# Patient Record
Sex: FEMALE | Race: OTHER | HISPANIC OR LATINO | Employment: FULL TIME | URBAN - METROPOLITAN AREA
[De-identification: names, ages, dates, MRNs, and addresses within clinical notes are randomized per-mention and may not be internally consistent; named-entity substitution may affect disease eponyms.]

---

## 2022-06-27 ENCOUNTER — OFFICE VISIT (OUTPATIENT)
Dept: INTERNAL MEDICINE CLINIC | Facility: CLINIC | Age: 32
End: 2022-06-27
Payer: COMMERCIAL

## 2022-06-27 VITALS
TEMPERATURE: 97.3 F | SYSTOLIC BLOOD PRESSURE: 104 MMHG | WEIGHT: 211.2 LBS | BODY MASS INDEX: 37.42 KG/M2 | RESPIRATION RATE: 16 BRPM | DIASTOLIC BLOOD PRESSURE: 68 MMHG | HEART RATE: 64 BPM | OXYGEN SATURATION: 98 % | HEIGHT: 63 IN

## 2022-06-27 DIAGNOSIS — Z00.00 ANNUAL PHYSICAL EXAM: Primary | ICD-10-CM

## 2022-06-27 DIAGNOSIS — J45.20 MILD INTERMITTENT ASTHMA WITHOUT COMPLICATION: ICD-10-CM

## 2022-06-27 DIAGNOSIS — Z3A.01 LESS THAN 8 WEEKS GESTATION OF PREGNANCY: ICD-10-CM

## 2022-06-27 PROBLEM — L30.9 ECZEMA: Status: ACTIVE | Noted: 2022-06-27

## 2022-06-27 PROBLEM — J30.9 ALLERGIC RHINITIS: Status: ACTIVE | Noted: 2022-06-27

## 2022-06-27 PROCEDURE — 3725F SCREEN DEPRESSION PERFORMED: CPT | Performed by: GENERAL ACUTE CARE HOSPITAL

## 2022-06-27 PROCEDURE — 1036F TOBACCO NON-USER: CPT | Performed by: GENERAL ACUTE CARE HOSPITAL

## 2022-06-27 PROCEDURE — 3008F BODY MASS INDEX DOCD: CPT | Performed by: GENERAL ACUTE CARE HOSPITAL

## 2022-06-27 PROCEDURE — 99385 PREV VISIT NEW AGE 18-39: CPT | Performed by: GENERAL ACUTE CARE HOSPITAL

## 2022-06-27 RX ORDER — FLUTICASONE PROPIONATE AND SALMETEROL 250; 50 UG/1; UG/1
1 POWDER RESPIRATORY (INHALATION) DAILY
COMMUNITY
Start: 2022-02-07

## 2022-06-27 RX ORDER — CLOBETASOL PROPIONATE 0.5 MG/G
1 OINTMENT TOPICAL 2 TIMES DAILY PRN
COMMUNITY
End: 2022-08-03

## 2022-06-27 RX ORDER — ALBUTEROL SULFATE 90 UG/1
2 AEROSOL, METERED RESPIRATORY (INHALATION)
COMMUNITY
Start: 2022-02-10

## 2022-06-27 RX ORDER — AZELASTINE 1 MG/ML
1-2 SPRAY, METERED NASAL 2 TIMES DAILY
COMMUNITY
Start: 2022-02-08

## 2022-06-27 RX ORDER — MONTELUKAST SODIUM 10 MG/1
TABLET ORAL
COMMUNITY
Start: 2022-06-21 | End: 2022-06-27

## 2022-06-27 NOTE — PROGRESS NOTES
Assessment/Plan:    Mild intermittent asthma without complication  Well controlled, on advair  Ok to stop singulair  Establish care with pulm    Less than 8 weeks gestation of pregnancy  Several home pregnancy test positive  Has appointment with ob    Annual physical exam  Order routine blood works  Up to date on vaccines  can get extra dose tdap at ob  Diagnoses and all orders for this visit:    Annual physical exam  -     hCG, quantitative; Future  -     Ambulatory Referral to Pulmonology; Future  -     Iron Panel (Includes Ferritin, Iron Sat%, Iron, and TIBC); Future  -     Ferritin; Future  -     Vitamin D 25 hydroxy; Future  -     CBC and differential; Future  -     Vitamin B12; Future  -     HIV 1/2 Antigen/Antibody (4th Generation) w Reflex [facility]; Future  -     Hepatitis C antibody; Future    Mild intermittent asthma without complication    Less than 8 weeks gestation of pregnancy    Other orders  -     Fluticasone-Salmeterol (Advair) 250-50 mcg/dose inhaler; Inhale 1 puff daily  -     albuterol (PROVENTIL HFA,VENTOLIN HFA) 90 mcg/act inhaler; Inhale 2 puffs  -     azelastine (ASTELIN) 0 1 % nasal spray; 1-2 sprays into each nostril 2 (two) times a day  -     clobetasol (TEMOVATE) 0 05 % ointment; Apply 1 application topically 2 (two) times a day as needed  -     Discontinue: montelukast (SINGULAIR) 10 mg tablet; take 1 tablet by mouth nightly DURING SEASONS OF INCREASED ASTHMA SYMPTOMS (Patient not taking: Reported on 6/27/2022)  -     budesonide (RINOCORT AQUA) 32 MCG/ACT nasal spray; 1 spray into each nostril daily        Depression Screening and Follow-up Plan: Patient was screened for depression during today's encounter  They screened negative with a PHQ-2 score of 0  Subjective:      Patient ID: Bonilla Palma is a 33 y o  female      HPI  Establish care  Home pregnancy test positive  Well controlled asthma, eczema, allergic rhinitis    The following portions of the patient's history were reviewed and updated as appropriate: allergies, past family history, past social history and past surgical history      Review of Systems      Objective:      /68   Pulse 64   Temp (!) 97 3 °F (36 3 °C)   Resp 16   Ht 5' 3" (1 6 m)   Wt 95 8 kg (211 lb 3 2 oz)   LMP 05/10/2022   SpO2 98%   BMI 37 41 kg/m²          Physical Exam

## 2022-07-11 ENCOUNTER — ULTRASOUND (OUTPATIENT)
Dept: OBGYN CLINIC | Facility: CLINIC | Age: 32
End: 2022-07-11
Payer: COMMERCIAL

## 2022-07-11 VITALS
SYSTOLIC BLOOD PRESSURE: 122 MMHG | HEIGHT: 63 IN | BODY MASS INDEX: 37.56 KG/M2 | WEIGHT: 212 LBS | DIASTOLIC BLOOD PRESSURE: 62 MMHG

## 2022-07-11 DIAGNOSIS — N91.1 SECONDARY AMENORRHEA: Primary | ICD-10-CM

## 2022-07-11 DIAGNOSIS — Z13.79 GENETIC SCREENING: ICD-10-CM

## 2022-07-11 PROCEDURE — 76817 TRANSVAGINAL US OBSTETRIC: CPT | Performed by: OBSTETRICS & GYNECOLOGY

## 2022-07-11 PROCEDURE — 99214 OFFICE O/P EST MOD 30 MIN: CPT | Performed by: OBSTETRICS & GYNECOLOGY

## 2022-07-19 ENCOUNTER — INITIAL PRENATAL (OUTPATIENT)
Dept: OBGYN CLINIC | Facility: CLINIC | Age: 32
End: 2022-07-19

## 2022-07-19 DIAGNOSIS — Z13.29 SCREENING FOR THYROID DISORDER: ICD-10-CM

## 2022-07-19 DIAGNOSIS — Z34.01 ENCOUNTER FOR SUPERVISION OF NORMAL FIRST PREGNANCY IN FIRST TRIMESTER: Primary | ICD-10-CM

## 2022-07-19 PROCEDURE — OBC: Performed by: OBSTETRICS & GYNECOLOGY

## 2022-07-19 NOTE — PROGRESS NOTES
OB INTAKE INTERVIEW  Patient is 33 y o y o  who presents for OB intake at 10 wks  She is accompanied by: Self via Telephone  The father of her baby Cassandra Jalloh is involved in the pregnancy and is 29years old    Last Menstrual Period: 05/10/22  Ultrasound: Measured 8 weeks 6 days on 2022  Estimated Date of Delivery: 22 consistent with LMP confirmed by US     Signs/Symptoms of Pregnancy    Current pregnancy symptoms: Nausea and heart burn  Taking Tums  Advised small frequent meals  Avoid greasy and acidic foods  Wait 2 hours before lying down after eating  Constipation no  Headaches no  Cramping/spotting no  PICA cravings no    Diabetes-  There is no height or weight on file to calculate BMI  If patient has 1 or more, please order early 1 hour GTT  History of GDM no  BMI >35 Yes 38 16  History of PCOS or current metformin use no  History of LGA/macrosomic infant (4000g/9lbs) no    If patient has 2 or more, please order early 1 hour GTT  BMI>30 Yes  AMA no  First degree relative with type 2 diabetes no  FOB is Type 1 Diabetic  History of chronic HTN, hyperlipidemia, elevated A1C no  High risk race (, , ,  or ) no    Hypertension- if you answer yes, please order preeclampsia labs (cbc, comprehensive metabolic panel, urine protein creatinine ratio, uric acid)  History of of chronic HTN no  History of gestational HTN no  History of preeclampsia, eclampsia, or HELLP syndrome no  History of diabetes no  History of lupus, autoimmune disease, kidney disease no    Thyroid- if yes order TSH with reflex T4  History of thyroid disease : Yes, as teenager  Was taking Synthroid, then levels normalized  Bleeding Disorder or Hx of DVT-patient or first degree relative with history of  Order the following if not done previously     (Factor V, antithrombin III, prothrombin gene mutation, protein C and S Ag, lupus anticoagulant, anticardiolipin, beta-2 glycoprotein)   no    OB/GYN-  History of abnormal pap smear no  History of HPV no  History of Herpes/HSV no  History of other STI (gonorrhea, chlamydia, trich) no  History of prior  no  History of prior  no  History of  delivery prior to 36 weeks 6 days no  History of blood transfusion no  Ok for blood transfusion Yes    Substance screening- if yes outside of tobacco for her or anyone in her home-order urine drug screen  History of tobacco use no  Currently using tobacco no  Currently using alcohol no  Presently using drugs no  Past drug use  no  IV drug use-If yes add Hep C antibody to labs no  Partner drug use no  Parent/Family drug use no    MRSA Screening-   Does the pt have a hx of MRSA? no  If yes- please follow MRSA protocol and obtain a nasal swab for MRSA culture    Immunizations:  Influenza vaccine given this season No  Discussed Tdap vaccine Yes  Discussed COVID Vaccine Yes  Recieved    Genetic/MFM-  Do you or your partner have a history of any of the following in yourselves or first degree relatives? Cystic fibrosis no  Spinal muscular atrophy no  Hemoglobinopathy/Sickle Cell/Thalassemia no  Fragile X Intellectual Disability no    If yes, discuss carrier screening and recommend consultation with MFM/genetic counseling  If no, discuss option for carrier screening and/or genetic testing with Nuchal Ultrasound  Patient interested Yes  Appointment at [place] [id number] made Yes     Interview education    [facility] Pregnancy Essentials Book reviewed and discussed Yes  Nurse/Family Partnership- patient may qualify No; referral placed No  Prenatal lab work scripts Yes  Extra labs ordered: Yes  1 hour Fasting Glucose, TSH with reflex to T4  The patient has a history now or in prior pregnancy notable for:Elevated BMI, History of Hypothyroidism as teenager      Details that I feel the provider should be aware of: Elevated BMI    PN1 visit scheduled   The patient was oriented to our practice, reviewed delivering physicians and [employer] for Delivery  All questions were answered  This was a telephone intake which lasted approximately 45 minutes       Interviewed by: Jada Bazzi, RN    Designated Lab: [facility]

## 2022-07-19 NOTE — PATIENT INSTRUCTIONS
Pregnancy at [address] to [address]:   You are now at the end of your first trimester and entering your second trimester  Morning sickness usually goes away by this time  You may have other symptoms such as fatigue, frequent urination, and headaches  You may have gained 2 to 4 pounds by now  DISCHARGE INSTRUCTIONS:   Return to the emergency department if:   You have pain or cramping in your abdomen or low back  You have heavy vaginal bleeding or clotting  You pass material that looks like tissue or large clots  Collect the material and bring it with you  Call your doctor or obstetrician if:   You cannot keep food or drinks down, and you are losing weight  You have light bleeding  You have chills or a fever  You have vaginal itching, burning, or pain  You have yellow, green, white, or foul-smelling vaginal discharge  You have pain or burning when you urinate, less urine than usual, or pink or bloody urine  You have questions or concerns about your condition or care  How to care for yourself at this stage of your pregnancy:       Get plenty of rest   You may feel more tired than normal  You may need to take naps or go to bed earlier  Manage nausea and vomiting  Avoid fatty and spicy foods  Eat small meals throughout the day instead of large meals  Michelle may help to decrease nausea  Ask your healthcare provider about other ways of decreasing nausea and vomiting  Eat a variety of healthy foods  Healthy foods include fruits, vegetables, whole-grain breads, low-fat dairy foods, beans, lean meats, and fish  Drink liquids as directed  Ask how much liquid to drink each day and which liquids are best for you  Limit caffeine to less than 200 milligrams each day  Limit your intake of fish to 2 servings each week  Choose fish low in mercury such as canned light tuna, shrimp, salmon, cod, or tilapia   Do not  eat fish high in mercury such as swordfish, tilefish, inderjit mackerel, and shark  Take prenatal vitamins as directed  Your need for certain vitamins and minerals, such as folic acid, increases during pregnancy  Prenatal vitamins provide some of the extra vitamins and minerals you need  Prenatal vitamins may also help to decrease the risk of certain birth defects  Do not smoke  Smoking increases your risk of a miscarriage and other health problems during your pregnancy  Smoking can cause your baby to be born too early or weigh less at birth  Ask your healthcare provider for information if you need help quitting  Do not drink alcohol  Alcohol passes from your body to your baby through the placenta  It can affect your baby's brain development and cause fetal alcohol syndrome (FAS)  FAS is a group of conditions that causes mental, behavior, and growth problems  Talk to your healthcare provider before you take any medicines  Many medicines may harm your baby if you take them when you are pregnant  Do not take any medicines, vitamins, herbs, or supplements without first talking to your healthcare provider  Never use illegal or street drugs (such as marijuana or cocaine) while you are pregnant  Safety tips during pregnancy:   Avoid hot tubs and saunas  Do not use a hot tub or sauna while you are pregnant, especially during your first trimester  Hot tubs and saunas may raise your baby's temperature and increase the risk of birth defects  Avoid toxoplasmosis  This is an infection caused by eating raw meat or being around infected cat feces  It can cause birth defects, miscarriages, and other problems  Wash your hands after you touch raw meat  Make sure any meat is well-cooked before you eat it  Avoid raw eggs and unpasteurized milk  Use gloves or ask someone else to clean your cat's litter box while you are pregnant  Changes happening with your baby: Your baby has fully formed fingernails and toenails  Your baby's heartbeat can now be heard   Ask your healthcare provider if you can listen to your baby's heartbeat  By week 14, your baby is over 4 inches long from the top of the head to the rump (baby's bottom)  Your baby weighs over 3 ounces  Prenatal care:  Prenatal care is a series of visits with your healthcare provider throughout your pregnancy  During the first 28 weeks of your pregnancy, you will see your healthcare provider 1 time each month  Prenatal care can help prevent problems during pregnancy and childbirth  Your healthcare provider will check your blood pressure and weight  Your baby's heart rate will also be checked  You may also need the following at some visits:  A pelvic exam  allows your healthcare provider to see your cervix (the bottom part of your uterus)  Your healthcare provider will use a speculum to open your vagina  He or she will check the size and shape of your uterus  Blood tests  may be done to check for any of the following:    Gestational diabetes or anemia (low iron level)    Blood type or Rh factor, or certain birth defects    Immunity to certain diseases, such as chickenpox or rubella    An infection, such as a sexually transmitted infection, HIV, or hepatitis B    Hepatitis B  may need to be prevented or treated  Hepatitis B is inflammation of the liver caused by the hepatitis B virus (HBV)  HBV can spread from a mother to her baby during delivery  You will be checked for HBV as early as possible in the first trimester of each pregnancy  You need the test even if you received the hepatitis B vaccine or were tested before  You may need to have an HBV infection treated before you give birth  Urine tests  may also be done to check for sugar and protein  These can be signs of gestational diabetes or preeclampsia  Urine tests may also be done to check for signs of infection  A fetal ultrasound  shows pictures of your baby inside your uterus   The pictures are used to check your baby's development, movement, and position  Genetic disorder screening tests  may be offered to you  These tests check your baby's risk for genetic disorders such as Down syndrome  A screening test includes a blood test and ultrasound  Follow up with your doctor or obstetrician as directed:  Go to all prenatal visits  Write down your questions so you remember to ask them during your visits  © Copyright [employer] 2022 Information is for End User's use only and may not be sold, redistributed or otherwise used for commercial purposes  All illustrations and images included in CareNotes® are the copyrighted property of A D A M , Inc  or [facility] [employer]   The above information is an  only  It is not intended as medical advice for individual conditions or treatments  Talk to your doctor, nurse or pharmacist before following any medical regimen to see if it is safe and effective for you  Congratulations!! Please review our Pregnancy Essential Guide and [facility] L&D Virtual tour from our [employer]  [facility] Pregnancy Essentials Guide  [facility] ([address] [facility] [place])     [address] [facility] [address] ([place])

## 2022-07-29 ENCOUNTER — APPOINTMENT (OUTPATIENT)
Dept: LAB | Facility: HOSPITAL | Age: 32
End: 2022-07-29
Attending: GENERAL ACUTE CARE HOSPITAL
Payer: COMMERCIAL

## 2022-07-29 DIAGNOSIS — Z13.29 SCREENING FOR THYROID DISORDER: ICD-10-CM

## 2022-07-29 DIAGNOSIS — Z00.00 ANNUAL PHYSICAL EXAM: ICD-10-CM

## 2022-07-29 DIAGNOSIS — Z34.01 ENCOUNTER FOR SUPERVISION OF NORMAL FIRST PREGNANCY IN FIRST TRIMESTER: ICD-10-CM

## 2022-07-29 LAB
25(OH)D3 SERPL-MCNC: 20 NG/ML (ref 30–100)
ABO GROUP BLD: NORMAL
B-HCG SERPL-ACNC: ABNORMAL MIU/ML
BACTERIA UR QL AUTO: ABNORMAL /HPF
BASOPHILS # BLD AUTO: 0.04 THOUSANDS/ΜL (ref 0–0.1)
BASOPHILS NFR BLD AUTO: 0 % (ref 0–1)
BILIRUB UR QL STRIP: NEGATIVE
BLD GP AB SCN SERPL QL: NEGATIVE
CLARITY UR: CLEAR
COLOR UR: COLORLESS
EOSINOPHIL # BLD AUTO: 0.17 THOUSAND/ΜL (ref 0–0.61)
EOSINOPHIL NFR BLD AUTO: 2 % (ref 0–6)
ERYTHROCYTE [DISTWIDTH] IN BLOOD BY AUTOMATED COUNT: 14.5 % (ref 11.6–15.1)
FERRITIN SERPL-MCNC: 16 NG/ML (ref 8–388)
GLUCOSE 1H P 50 G GLC PO SERPL-MCNC: 155 MG/DL (ref 40–134)
GLUCOSE UR STRIP-MCNC: ABNORMAL MG/DL
HBV SURFACE AG SER QL: NORMAL
HCT VFR BLD AUTO: 36.8 % (ref 34.8–46.1)
HCV AB SER QL: NORMAL
HGB BLD-MCNC: 12 G/DL (ref 11.5–15.4)
HGB UR QL STRIP.AUTO: NEGATIVE
IMM GRANULOCYTES # BLD AUTO: 0.05 THOUSAND/UL (ref 0–0.2)
IMM GRANULOCYTES NFR BLD AUTO: 1 % (ref 0–2)
IRON SATN MFR SERPL: 16 % (ref 15–50)
IRON SERPL-MCNC: 54 UG/DL (ref 50–170)
KETONES UR STRIP-MCNC: ABNORMAL MG/DL
LEUKOCYTE ESTERASE UR QL STRIP: NEGATIVE
LYMPHOCYTES # BLD AUTO: 1.93 THOUSANDS/ΜL (ref 0.6–4.47)
LYMPHOCYTES NFR BLD AUTO: 18 % (ref 14–44)
MCH RBC QN AUTO: 29.1 PG (ref 26.8–34.3)
MCHC RBC AUTO-ENTMCNC: 32.6 G/DL (ref 31.4–37.4)
MCV RBC AUTO: 89 FL (ref 82–98)
MONOCYTES # BLD AUTO: 0.33 THOUSAND/ΜL (ref 0.17–1.22)
MONOCYTES NFR BLD AUTO: 3 % (ref 4–12)
NEUTROPHILS # BLD AUTO: 8.29 THOUSANDS/ΜL (ref 1.85–7.62)
NEUTS SEG NFR BLD AUTO: 76 % (ref 43–75)
NITRITE UR QL STRIP: NEGATIVE
NON-SQ EPI CELLS URNS QL MICRO: ABNORMAL /HPF
NRBC BLD AUTO-RTO: 0 /100 WBCS
PH UR STRIP.AUTO: 5.5 [PH]
PLATELET # BLD AUTO: 390 THOUSANDS/UL (ref 149–390)
PMV BLD AUTO: 8.8 FL (ref 8.9–12.7)
PROT UR STRIP-MCNC: NEGATIVE MG/DL
RBC # BLD AUTO: 4.13 MILLION/UL (ref 3.81–5.12)
RBC #/AREA URNS AUTO: ABNORMAL /HPF
RH BLD: POSITIVE
RUBV IGG SERPL IA-ACNC: 27.8 IU/ML
SP GR UR STRIP.AUTO: 1 (ref 1–1.03)
TIBC SERPL-MCNC: 343 UG/DL (ref 250–450)
TSH SERPL DL<=0.05 MIU/L-ACNC: 1.56 UIU/ML (ref 0.45–4.5)
UROBILINOGEN UR STRIP-ACNC: <2 MG/DL
WBC # BLD AUTO: 10.81 THOUSAND/UL (ref 4.31–10.16)
WBC #/AREA URNS AUTO: ABNORMAL /HPF

## 2022-07-29 PROCEDURE — 82607 VITAMIN B-12: CPT

## 2022-07-29 PROCEDURE — 82306 VITAMIN D 25 HYDROXY: CPT

## 2022-07-29 PROCEDURE — 80081 OBSTETRIC PANEL INC HIV TSTG: CPT

## 2022-07-29 PROCEDURE — 87086 URINE CULTURE/COLONY COUNT: CPT

## 2022-07-29 PROCEDURE — 83550 IRON BINDING TEST: CPT

## 2022-07-29 PROCEDURE — 36415 COLL VENOUS BLD VENIPUNCTURE: CPT

## 2022-07-29 PROCEDURE — 81001 URINALYSIS AUTO W/SCOPE: CPT

## 2022-07-29 PROCEDURE — 82728 ASSAY OF FERRITIN: CPT

## 2022-07-29 PROCEDURE — 86803 HEPATITIS C AB TEST: CPT

## 2022-07-29 PROCEDURE — 82950 GLUCOSE TEST: CPT

## 2022-07-29 PROCEDURE — 84443 ASSAY THYROID STIM HORMONE: CPT

## 2022-07-29 PROCEDURE — 83540 ASSAY OF IRON: CPT

## 2022-07-29 PROCEDURE — 84702 CHORIONIC GONADOTROPIN TEST: CPT

## 2022-07-30 LAB
BACTERIA UR CULT: NORMAL
VIT B12 SERPL-MCNC: 402 PG/ML (ref 100–900)

## 2022-07-31 LAB
HIV 1+2 AB+HIV1 P24 AG SERPL QL IA: NORMAL
RPR SER QL: NORMAL

## 2022-08-01 ENCOUNTER — TELEPHONE (OUTPATIENT)
Dept: OBGYN CLINIC | Facility: CLINIC | Age: 32
End: 2022-08-01

## 2022-08-01 DIAGNOSIS — O99.810 ABNORMAL GLUCOSE AFFECTING PREGNANCY: Primary | ICD-10-CM

## 2022-08-01 NOTE — TELEPHONE ENCOUNTER
----- Message from Barbie Nur MD sent at 8/1/2022 11:49 AM EDT -----  All labs are normal for failed 1hr GTT  Needs to do 3hr GTT

## 2022-08-01 NOTE — PROGRESS NOTES
Pt is here for pn1 visit   No concerns at this time  Urine neg/neg  No VB  + cramping last night upper abdomin    Nausea/vomiting   pn1 labs completed/3hr glucose needed  UTD covid vaccines  Last Pap 11/2019 NILM/HPV -  GC/CHL collected today   Blue Folder given/reviewed w/ pt

## 2022-08-03 ENCOUNTER — INITIAL PRENATAL (OUTPATIENT)
Dept: OBGYN CLINIC | Facility: CLINIC | Age: 32
End: 2022-08-03

## 2022-08-03 ENCOUNTER — TELEPHONE (OUTPATIENT)
Dept: PERINATAL CARE | Facility: OTHER | Age: 32
End: 2022-08-03

## 2022-08-03 VITALS
HEIGHT: 63 IN | DIASTOLIC BLOOD PRESSURE: 72 MMHG | BODY MASS INDEX: 37.35 KG/M2 | WEIGHT: 210.8 LBS | SYSTOLIC BLOOD PRESSURE: 116 MMHG

## 2022-08-03 DIAGNOSIS — Z34.02 FIRST PREGNANCY, SECOND TRIMESTER: Primary | ICD-10-CM

## 2022-08-03 DIAGNOSIS — J45.20 MILD INTERMITTENT ASTHMA WITHOUT COMPLICATION: ICD-10-CM

## 2022-08-03 DIAGNOSIS — E66.9 OBESITY (BMI 30.0-34.9): ICD-10-CM

## 2022-08-03 PROBLEM — J45.909 ASTHMA: Status: ACTIVE | Noted: 2022-08-03

## 2022-08-03 PROBLEM — E66.811 OBESITY (BMI 30.0-34.9): Status: ACTIVE | Noted: 2017-02-21

## 2022-08-03 PROBLEM — J45.909 ASTHMA: Status: RESOLVED | Noted: 2022-08-03 | Resolved: 2022-08-03

## 2022-08-03 PROBLEM — Z3A.01 LESS THAN 8 WEEKS GESTATION OF PREGNANCY: Status: RESOLVED | Noted: 2022-06-27 | Resolved: 2022-08-03

## 2022-08-03 PROBLEM — Z00.00 ANNUAL PHYSICAL EXAM: Status: RESOLVED | Noted: 2022-06-27 | Resolved: 2022-08-03

## 2022-08-03 LAB
SL AMB  POCT GLUCOSE, UA: NORMAL
SL AMB POCT URINE PROTEIN: NORMAL

## 2022-08-03 PROCEDURE — 87491 CHLMYD TRACH DNA AMP PROBE: CPT | Performed by: PHYSICIAN ASSISTANT

## 2022-08-03 PROCEDURE — PNV: Performed by: PHYSICIAN ASSISTANT

## 2022-08-03 PROCEDURE — 87591 N.GONORRHOEAE DNA AMP PROB: CPT | Performed by: PHYSICIAN ASSISTANT

## 2022-08-03 NOTE — TELEPHONE ENCOUNTER
Left patient a message that her MFM appointment had to be rescheduled  The new time, date and location were provided - 9/28/22  8:30  [place]  The patient has been instructed to please call us back at [phone] with any questions or concerns

## 2022-08-03 NOTE — ASSESSMENT & PLAN NOTE
33 y o  female here for routine PN visit at [address] [facility] well overall  First OB labs - normal  Gc/chlamydia - collected today   Pap - 11/14/19 neg/neg  Blue folder -  Given and discussed today   Genetic screening - has MFM consult 8/8 to discuss     Notes a lot of nausea, some vomiting (not daily)  Has history of a gastric ulcer and reflux  Previously was on Nexium  Currently on no meds  Recommended omeprazole 20mg po daily  Can use Zofran in the future if needed

## 2022-08-04 LAB
C TRACH DNA SPEC QL NAA+PROBE: NEGATIVE
N GONORRHOEA DNA SPEC QL NAA+PROBE: NEGATIVE

## 2022-08-06 ENCOUNTER — NURSE TRIAGE (OUTPATIENT)
Dept: OTHER | Facility: OTHER | Age: 32
End: 2022-08-06

## 2022-08-06 ENCOUNTER — APPOINTMENT (OUTPATIENT)
Dept: LAB | Facility: HOSPITAL | Age: 32
End: 2022-08-06
Attending: OBSTETRICS & GYNECOLOGY
Payer: COMMERCIAL

## 2022-08-06 DIAGNOSIS — O99.810 ABNORMAL GLUCOSE AFFECTING PREGNANCY: ICD-10-CM

## 2022-08-06 LAB — GLUCOSE P FAST SERPL-MCNC: 103 MG/DL (ref 65–99)

## 2022-08-06 PROCEDURE — 82951 GLUCOSE TOLERANCE TEST (GTT): CPT

## 2022-08-06 PROCEDURE — 36415 COLL VENOUS BLD VENIPUNCTURE: CPT

## 2022-08-06 NOTE — TELEPHONE ENCOUNTER
Pt called in stating she had her fasting glucose checked at the lab before she started her 3 hour blood glucose check and it came back at 103  Since it came back at 103 the lab said she couldn't continue with the test and cx it  Pt and her  are very upset because pt's  checked her BS on his glucose meter and it read 99  They would like another script to have the testing done again  Please advise

## 2022-08-06 NOTE — TELEPHONE ENCOUNTER
Regarding: New Order for 3 hr Glucose test  ----- Message from Jose Lujan sent at 8/6/2022  9:28 AM EDT -----  "My wife went for a 3 hr glucose test and when they tested her they said her blood level was to high  I tested her on the glucose monitor and the level was in standard range and we asked to continue the test and they said they doctor needs to right a whole new order  We really needed this for another appt  We have on Monday   Can one be put sent over "

## 2022-08-06 NOTE — TELEPHONE ENCOUNTER
Reason for Disposition   General information question, no triage required and triager able to answer question    Answer Assessment - Initial Assessment Questions  1   REASON FOR CALL or QUESTION: "What is your reason for calling today?" or "How can I best help you?" or "What question do you have that I can help answer?"      See note    Protocols used: INFORMATION ONLY CALL - NO TRIAGE-ADULT-AH

## 2022-08-07 PROBLEM — O24.410 DIET CONTROLLED GESTATIONAL DIABETES MELLITUS (GDM) IN FIRST TRIMESTER: Status: ACTIVE | Noted: 2022-08-07

## 2022-08-07 PROBLEM — O99.211 MATERNAL OBESITY, ANTEPARTUM, FIRST TRIMESTER: Status: ACTIVE | Noted: 2022-08-07

## 2022-08-08 ENCOUNTER — ROUTINE PRENATAL (OUTPATIENT)
Dept: PERINATAL CARE | Facility: CLINIC | Age: 32
End: 2022-08-08
Payer: COMMERCIAL

## 2022-08-08 ENCOUNTER — TELEPHONE (OUTPATIENT)
Dept: OBGYN CLINIC | Facility: CLINIC | Age: 32
End: 2022-08-08

## 2022-08-08 VITALS
SYSTOLIC BLOOD PRESSURE: 112 MMHG | HEIGHT: 63 IN | HEART RATE: 85 BPM | DIASTOLIC BLOOD PRESSURE: 63 MMHG | BODY MASS INDEX: 37.25 KG/M2 | WEIGHT: 210.2 LBS

## 2022-08-08 DIAGNOSIS — O99.211 MATERNAL OBESITY, ANTEPARTUM, FIRST TRIMESTER: ICD-10-CM

## 2022-08-08 DIAGNOSIS — Z3A.12 12 WEEKS GESTATION OF PREGNANCY: ICD-10-CM

## 2022-08-08 DIAGNOSIS — Z36.82 ENCOUNTER FOR ANTENATAL SCREENING FOR NUCHAL TRANSLUCENCY: ICD-10-CM

## 2022-08-08 DIAGNOSIS — Z13.79 GENETIC SCREENING: ICD-10-CM

## 2022-08-08 DIAGNOSIS — O24.410 DIET CONTROLLED GESTATIONAL DIABETES MELLITUS (GDM) IN FIRST TRIMESTER: Primary | ICD-10-CM

## 2022-08-08 PROCEDURE — 76813 OB US NUCHAL MEAS 1 GEST: CPT | Performed by: OBSTETRICS & GYNECOLOGY

## 2022-08-08 PROCEDURE — 99203 OFFICE O/P NEW LOW 30 MIN: CPT | Performed by: OBSTETRICS & GYNECOLOGY

## 2022-08-08 PROCEDURE — 36415 COLL VENOUS BLD VENIPUNCTURE: CPT | Performed by: OBSTETRICS & GYNECOLOGY

## 2022-08-08 NOTE — TELEPHONE ENCOUNTER
----- Message from [employer] [address] sent at 1/7/9366  9:15 AM EDT -----  Regarding: Reorder test  Could you send another order for the glucose test? At the lab I went they said I could not test because the glucose was too high  However my  who has a blood glucose monitor tested me and my sugar levels were fine (99mg/dl)  I would like to get this done today at another lab  Thanks,  Gurinder Guadarrama

## 2022-08-08 NOTE — PROGRESS NOTES
Patient chose to have [facility] Non-invasive Prenatal Screen  Patient given brochure and is aware [facility] will contact patients insurance and coordinate coverage  Patient made aware she will need to respond to text message or e-mail from [employer] within 2 business days or testing will be run through insurance  Patient informed text message will come from area code  "415"  Provided [employer] # [phone] and web site : [email or web address]     2 vials of blood drawn from left arm, patient tolerated blood draw without difficulty  Specimens labeled with patient identifiers (name, date of birth, specimen collection date), order and specimen was verified with patient, packed and sent via [place] [address]  Copy of lab order scanned to Epic media  Maternal Fetal Medicine will have results in approximately 7-10 business days and will call patient or notify via [address]  Patient aware viewing lab result online will reveal fetal sex If ordered  Patient verbalized understanding of all instructions and no questions at this time

## 2022-08-08 NOTE — TELEPHONE ENCOUNTER
----- Message from Darrell Amaya MD sent at 8/8/2022  9:47 AM EDT -----  Elevated fasting therefore gest DM  Refer to Diabetic Services

## 2022-08-08 NOTE — LETTER
August 8, 2022     Mukesh ReddyMagnet, [address] [facility] [address]    Patient: Torsten Blanton   YOB: 1990   Date of Visit: 8/8/2022       Dear Dr Juan Guerrero: Thank you for referring Torsten Blanton to me for evaluation  Below are my notes for this consultation  If you have questions, please do not hesitate to call me  I look forward to following your patient along with you  Sincerely,        René Aguilar MD        CC: No Recipients  René Aguilar MD  8/7/2022  3:07 PM  Sign when Signing Visit  Please refer to the [facility] ultrasound report in Ob Procedures for additional information regarding today's visit

## 2022-08-24 DIAGNOSIS — J45.20 MILD INTERMITTENT ASTHMA WITHOUT COMPLICATION: ICD-10-CM

## 2022-08-24 DIAGNOSIS — J45.20 MILD INTERMITTENT ASTHMA WITHOUT COMPLICATION: Primary | ICD-10-CM

## 2022-08-24 RX ORDER — ALBUTEROL SULFATE 90 UG/1
2 AEROSOL, METERED RESPIRATORY (INHALATION) EVERY 4 HOURS PRN
Qty: 8 G | Refills: 1 | Status: SHIPPED | OUTPATIENT
Start: 2022-08-24 | End: 2022-08-24 | Stop reason: SDUPTHER

## 2022-08-24 RX ORDER — FLUTICASONE PROPIONATE AND SALMETEROL 250; 50 UG/1; UG/1
1 POWDER RESPIRATORY (INHALATION) DAILY
Qty: 60 BLISTER | Refills: 1 | Status: SHIPPED | OUTPATIENT
Start: 2022-08-24 | End: 2022-08-24 | Stop reason: SDUPTHER

## 2022-08-24 RX ORDER — AZELASTINE 1 MG/ML
1-2 SPRAY, METERED NASAL 2 TIMES DAILY
Qty: 1 ML | Refills: 1 | Status: SHIPPED | OUTPATIENT
Start: 2022-08-24 | End: 2022-08-24 | Stop reason: SDUPTHER

## 2022-08-25 RX ORDER — ALBUTEROL SULFATE 90 UG/1
2 AEROSOL, METERED RESPIRATORY (INHALATION) EVERY 4 HOURS PRN
Qty: 8 G | Refills: 0 | Status: SHIPPED | OUTPATIENT
Start: 2022-08-25

## 2022-08-25 RX ORDER — FLUTICASONE PROPIONATE AND SALMETEROL 250; 50 UG/1; UG/1
1 POWDER RESPIRATORY (INHALATION) DAILY
Qty: 60 BLISTER | Refills: 0 | Status: SHIPPED | OUTPATIENT
Start: 2022-08-25

## 2022-08-25 RX ORDER — AZELASTINE 1 MG/ML
1-2 SPRAY, METERED NASAL 2 TIMES DAILY
Qty: 1 ML | Refills: 0 | Status: SHIPPED | OUTPATIENT
Start: 2022-08-25

## 2022-09-02 ENCOUNTER — ROUTINE PRENATAL (OUTPATIENT)
Dept: OBGYN CLINIC | Facility: CLINIC | Age: 32
End: 2022-09-02

## 2022-09-02 VITALS
WEIGHT: 206 LBS | BODY MASS INDEX: 36.5 KG/M2 | DIASTOLIC BLOOD PRESSURE: 48 MMHG | SYSTOLIC BLOOD PRESSURE: 100 MMHG | HEIGHT: 63 IN

## 2022-09-02 DIAGNOSIS — Z36.9 UNSPECIFIED ANTENATAL SCREENING: ICD-10-CM

## 2022-09-02 DIAGNOSIS — O24.410 DIET CONTROLLED GESTATIONAL DIABETES MELLITUS (GDM) IN FIRST TRIMESTER: ICD-10-CM

## 2022-09-02 DIAGNOSIS — Z3A.16 16 WEEKS GESTATION OF PREGNANCY: Primary | ICD-10-CM

## 2022-09-02 LAB
SL AMB  POCT GLUCOSE, UA: NEGATIVE
SL AMB POCT URINE PROTEIN: NEGATIVE

## 2022-09-02 PROCEDURE — PNV: Performed by: PHYSICIAN ASSISTANT

## 2022-09-02 RX ORDER — MONTELUKAST SODIUM 10 MG/1
TABLET ORAL
COMMUNITY
Start: 2022-08-16 | End: 2022-10-26 | Stop reason: ALTCHOICE

## 2022-09-02 NOTE — PROGRESS NOTES
Pt and  are present today for a routine OB visit  She is 16w3d  They expressed frustration today regarding the lab and her diagnosis of GDM based on an elevated FBS and elevated 1 hour GTT  They are upset with the way the lab handled this testing and frustrated that she could not complete the 3 hour test      Reviewed both Dr William Salcedo and [facility] recommendation of the GDM diagnosis at this time  Reviewed that an elevated FBS and an elevated 1 hour are enough to make this diagnosis  She reports she has already been checking fingerstick checks at home and reports normal readings for the most part  Some PP elevations  She has not yet met with diabetic pathways and it appears the referral has not yet been placed  The couple would like to repeat the 3 hour GTT and have an A1C drawn  Reviewed I am willing to order another 3 hour GTT (A1C already placed by [facility]), but I reviewed in detail with the couple that the pt will likely fail the repeat 3 hour GTT thus further confirming the GDM diagnosis  I additionally reviewed with them that if the pt passes the 3 hour GTT she will still need to be considered GDM based on the original testing results  Reviewed this diagnosis is made to keep both patient and baby healthy  We reviewed that it is possible to manage GDM with diet alone and reviewed that sometimes this diagnosis does require insulin management  All questions answered    Both patient and  are agreeable to this plan  They are aware she does have the diagnosis of GDM at this time and will have this diagnosis for the pregnancy  They still would like to complete 3 hour regardless  Order for diabetic pathways was placed today as well    Level 2 US scheduled  AFP order placed today  Aware of ideal timing between 16-18 wks  Pt denies VB, LOF, cramping/abdominal pain  Reviewed PTL precautions with pt  Pt aware to call office with any VB, LOF, severe cramping/abdominal pain      Assessment and Plan    Diet controlled gestational diabetes mellitus (GDM) in first trimester  See note above  Pt insists to repeat 3 hour GTT  Aware that diagnosis of GDM will NOT change regardless of these results  Diabetic pathways referral was placed  A1C ordered by CARI  Discussed case with Dr Ambreen Walton      16 weeks gestation of pregnancy  AFP ordered today  Level 2 scheduled  NIPT negative  Pt aware to call office with any VB, LOF, severe cramping/abdominal pain      RTO in 4 wks for routine OB

## 2022-09-02 NOTE — ASSESSMENT & PLAN NOTE
See note above  Pt insists to repeat 3 hour GTT  Aware that diagnosis of GDM will NOT change regardless of these results  Diabetic pathways referral was placed  A1C ordered by MFLORIE  Discussed case with Dr Jeremie Buitrago

## 2022-09-12 ENCOUNTER — TELEPHONE (OUTPATIENT)
Dept: PERINATAL CARE | Facility: CLINIC | Age: 32
End: 2022-09-12

## 2022-09-12 NOTE — TELEPHONE ENCOUNTER
Multiple attempts to reach patient to schedule Diabetic consult for Class 1 and Class 2  Messages left for patient to call back  Patient has not returned our calls

## 2022-09-23 ENCOUNTER — APPOINTMENT (OUTPATIENT)
Dept: LAB | Facility: HOSPITAL | Age: 32
End: 2022-09-23
Payer: COMMERCIAL

## 2022-09-23 DIAGNOSIS — O24.410 DIET CONTROLLED GESTATIONAL DIABETES MELLITUS (GDM) IN FIRST TRIMESTER: ICD-10-CM

## 2022-09-23 DIAGNOSIS — Z3A.12 12 WEEKS GESTATION OF PREGNANCY: ICD-10-CM

## 2022-09-23 DIAGNOSIS — Z3A.16 16 WEEKS GESTATION OF PREGNANCY: ICD-10-CM

## 2022-09-23 DIAGNOSIS — Z36.9 UNSPECIFIED ANTENATAL SCREENING: ICD-10-CM

## 2022-09-23 LAB
EST. AVERAGE GLUCOSE BLD GHB EST-MCNC: 108 MG/DL
GLUCOSE 1H P 100 G GLC PO SERPL-MCNC: 135 MG/DL (ref 65–179)
GLUCOSE 2H P 100 G GLC PO SERPL-MCNC: 112 MG/DL (ref 65–154)
GLUCOSE 3H P 100 G GLC PO SERPL-MCNC: 85 MG/DL (ref 65–139)
GLUCOSE P FAST SERPL-MCNC: 89 MG/DL (ref 65–94)
HBA1C MFR BLD: 5.4 %

## 2022-09-23 PROCEDURE — 82951 GLUCOSE TOLERANCE TEST (GTT): CPT

## 2022-09-23 PROCEDURE — 83036 HEMOGLOBIN GLYCOSYLATED A1C: CPT

## 2022-09-23 PROCEDURE — 36415 COLL VENOUS BLD VENIPUNCTURE: CPT

## 2022-09-23 PROCEDURE — 82952 GTT-ADDED SAMPLES: CPT

## 2022-09-23 PROCEDURE — 82105 ALPHA-FETOPROTEIN SERUM: CPT

## 2022-09-25 LAB
2ND TRIMESTER 4 SCREEN SERPL-IMP: NORMAL
AFP ADJ MOM SERPL: 1.02
AFP INTERP AMN-IMP: NORMAL
AFP INTERP SERPL-IMP: NORMAL
AFP INTERP SERPL-IMP: NORMAL
AFP SERPL-MCNC: 45.9 NG/ML
AGE AT DELIVERY: 34.3 YR
GA METHOD: NORMAL
GA: 19.4 WEEKS
IDDM PATIENT QL: NO
MULTIPLE PREGNANCY: NO
NEURAL TUBE DEFECT RISK FETUS: NORMAL %

## 2022-09-27 ENCOUNTER — TELEPHONE (OUTPATIENT)
Dept: OBGYN CLINIC | Facility: CLINIC | Age: 32
End: 2022-09-27

## 2022-09-27 DIAGNOSIS — O99.810 ABNORMAL GLUCOSE TOLERANCE IN PREGNANCY: Primary | ICD-10-CM

## 2022-09-27 NOTE — TELEPHONE ENCOUNTER
----- Message from Jeannie Denny PA-C sent at 9/27/2022  6:39 AM EDT -----  Please call this patient and let her know her 3 hour GTT was normal as well as her AFP screen  As discussed at her last visit, I would still like her to follow with diabetic pathways for fingerstick checks since we now have 1 positive 3 hour and 1 negative 3 hour and cannot entirely rule out GDM  Please advise her to call back diabetic pathways

## 2022-09-28 ENCOUNTER — ROUTINE PRENATAL (OUTPATIENT)
Dept: PERINATAL CARE | Facility: CLINIC | Age: 32
End: 2022-09-28
Payer: COMMERCIAL

## 2022-09-28 ENCOUNTER — ROUTINE PRENATAL (OUTPATIENT)
Dept: OBGYN CLINIC | Facility: CLINIC | Age: 32
End: 2022-09-28

## 2022-09-28 VITALS
DIASTOLIC BLOOD PRESSURE: 54 MMHG | HEIGHT: 63 IN | HEART RATE: 94 BPM | BODY MASS INDEX: 36.96 KG/M2 | SYSTOLIC BLOOD PRESSURE: 100 MMHG | WEIGHT: 208.6 LBS

## 2022-09-28 VITALS — WEIGHT: 209 LBS | BODY MASS INDEX: 37.02 KG/M2 | DIASTOLIC BLOOD PRESSURE: 60 MMHG | SYSTOLIC BLOOD PRESSURE: 114 MMHG

## 2022-09-28 DIAGNOSIS — Z36.86 ENCOUNTER FOR ANTENATAL SCREENING FOR CERVICAL LENGTH: ICD-10-CM

## 2022-09-28 DIAGNOSIS — Z3A.20 20 WEEKS GESTATION OF PREGNANCY: ICD-10-CM

## 2022-09-28 DIAGNOSIS — Z34.92 PRENATAL CARE IN SECOND TRIMESTER: Primary | ICD-10-CM

## 2022-09-28 DIAGNOSIS — O24.410 DIET CONTROLLED GESTATIONAL DIABETES MELLITUS (GDM) IN FIRST TRIMESTER: ICD-10-CM

## 2022-09-28 DIAGNOSIS — O24.410 DIET CONTROLLED GESTATIONAL DIABETES MELLITUS (GDM) IN FIRST TRIMESTER: Primary | ICD-10-CM

## 2022-09-28 DIAGNOSIS — O99.212 OBESITY COMPLICATING PREGNANCY, SECOND TRIMESTER: ICD-10-CM

## 2022-09-28 DIAGNOSIS — Z36.89 ENCOUNTER FOR FETAL ANATOMIC SURVEY: ICD-10-CM

## 2022-09-28 PROBLEM — E66.811 OBESITY (BMI 30.0-34.9): Status: RESOLVED | Noted: 2017-02-21 | Resolved: 2022-09-28

## 2022-09-28 PROBLEM — N83.202 LEFT OVARIAN CYST: Status: ACTIVE | Noted: 2022-09-28

## 2022-09-28 PROBLEM — E66.9 OBESITY (BMI 30.0-34.9): Status: RESOLVED | Noted: 2017-02-21 | Resolved: 2022-09-28

## 2022-09-28 PROCEDURE — 76817 TRANSVAGINAL US OBSTETRIC: CPT | Performed by: OBSTETRICS & GYNECOLOGY

## 2022-09-28 PROCEDURE — 76811 OB US DETAILED SNGL FETUS: CPT | Performed by: OBSTETRICS & GYNECOLOGY

## 2022-09-28 PROCEDURE — 99214 OFFICE O/P EST MOD 30 MIN: CPT | Performed by: OBSTETRICS & GYNECOLOGY

## 2022-09-28 PROCEDURE — PNV: Performed by: NURSE PRACTITIONER

## 2022-09-28 NOTE — PROGRESS NOTES
Corie Lainez presents today for routine OB visit at 20w1d  She is a   Blood Pressure: 114/60  Wt=94 8 kg (209 lb); Body mass index is 37 02 kg/m² ; TWG=-0 816 kg (-1 lb 12 8 oz)  Fetal Heart Rate: 134; Fundal Height (cm): 20 cm  Abdomen: gravid, soft, non-tender  She reports no complaints  Denies uterine contractions or persistent cramping  Denies vaginal bleeding or leaking of fluid  Reports fetal movement  Scheduled for ultrasound 22  She is scheduled for Diabetic Education next week  Reviewed common discomforts of pregnancy in second trimester and warning signs  Advised to continue medications and return in 4 weeks  Current Outpatient Medications   Medication Instructions    albuterol (PROVENTIL HFA,VENTOLIN HFA) 90 mcg/act inhaler 2 puffs, Inhalation, Every 4 hours PRN    azelastine (ASTELIN) 0 1 % nasal spray 1-2 sprays, Nasal, 2 times daily    budesonide (RINOCORT AQUA) 32 MCG/ACT nasal spray 1 spray, Nasal, Daily    Fluticasone-Salmeterol (Advair) 250-50 mcg/dose inhaler 1 puff, Inhalation, Daily    montelukast (SINGULAIR) 10 mg tablet take 1 tablet by mouth nightly DURING SEASONS OF INCREASED ASTHMA SYMPTOMS    Prenatal MV-Min-Fe Fum-FA-DHA (PRENATAL+DHA PO) Oral         Pregnancy  Problems (from 22 to present)     Problem Noted Resolved    Diet controlled gestational diabetes mellitus (GDM) in first trimester 2022 by Tiana Yates MD No    Overview Addendum 2022 11:56 AM by BLAYNE Shah     Abnormal glucola, failed early 3h gtt (fasting glucose 103), referred to Diabetes Educators  -Patient requested repeat 3h gtt and A1C, as she used her 's glucometer and fasting glucose was 99    -Patient agreeable to diabetic education, was initially resistant to diagnosis  -Partner has T1DM  -Repeat 3hr GTT normal  A1c 5 4    Per MFM: I reviewed Reba's gestational diabetes screening results   She used her partner's glucometer (he has a long history of T1DM), and checked her glucose fasting and post-prandial for about a week  Post-prandial readings were within range, but fasting glucose was elevated to the 90s-100s  I discussed with them that this is consistent with gestational diabetes, and warrants management as such  We discussed goal glucose ranges in pregnancy for optimal fetal development, including fasting <90, and 2 hour post-prandial <120  I recommend management by our Diabetes Educators, and  discussed our program in detail, and they were in agreement to proceed with diabetic education            Previous Version    20 weeks gestation of pregnancy 8/3/2022 by Gabby Kramer PA-C No    Overview Addendum 9/28/2022 11:57 AM by BLAYNE Swan     First OB labs - complete; failed 1 hour and FBS on 3 hour-GDM  Gc/chlamydia - neg  Pap - up to date  Blue folder - given    Genetic screening - negative   AFP - negative    28 week labs -  COVID vaccine -   TDAP -   Delivery consent -   Yellow folder -     Breast pump -   Pediatrician -   Perineal massage -   GBS -            Previous Version

## 2022-09-28 NOTE — PROGRESS NOTES
No cramping or bleeding  Not feeling much movement  Had her Level 2 today- It's a girl  Needs another anatomy scan baby not cooperative- scheduled  Aware Flu shots will become available soon  Covid x 3  PN1 labs and genetic testing- complete  A positive

## 2022-09-28 NOTE — LETTER
Benjy Vazquez,    We discussed her discrepant GDM screening results  Her fasting glucose has been high 90s-low 100s on her partner's glucometer  They were in agreement to manage as GDM and set up diabetic education       Thanks,  Selvin Goldman MD

## 2022-09-28 NOTE — PROGRESS NOTES
Ultrasound Probe Disinfection    A transvaginal ultrasound was performed  Prior to use, disinfection was performed with High Level Disinfection Process (Trophon)  Probe serial number B2: [id number] and B3: [id number] was used        Yodit London  09/28/22  8:33 AM

## 2022-09-30 ENCOUNTER — TELEPHONE (OUTPATIENT)
Dept: PERINATAL CARE | Facility: OTHER | Age: 32
End: 2022-09-30

## 2022-09-30 NOTE — TELEPHONE ENCOUNTER
Patient was on [address] Maternal Fetal Medicine wait list for [facility] Fetal Echo to be preformed between 10/12-10/28  Appointment was scheduled for patient 10/18/2022 at 10:00am in [facility] 9/30/2022 at 1515 to confirm with patient time date and location of appointment  Patient was provided numbers of MF to call back

## 2022-10-04 ENCOUNTER — TELEMEDICINE (OUTPATIENT)
Dept: PERINATAL CARE | Facility: CLINIC | Age: 32
End: 2022-10-04
Payer: COMMERCIAL

## 2022-10-04 DIAGNOSIS — O99.212 OBESITY COMPLICATING PREGNANCY, SECOND TRIMESTER: ICD-10-CM

## 2022-10-04 DIAGNOSIS — Z3A.21 21 WEEKS GESTATION OF PREGNANCY: ICD-10-CM

## 2022-10-04 DIAGNOSIS — O24.410 DIET CONTROLLED GESTATIONAL DIABETES MELLITUS (GDM) IN SECOND TRIMESTER: Primary | ICD-10-CM

## 2022-10-04 DIAGNOSIS — O24.410 DIET CONTROLLED GESTATIONAL DIABETES MELLITUS (GDM) IN FIRST TRIMESTER: Primary | ICD-10-CM

## 2022-10-04 PROCEDURE — G0109 DIAB MANAGE TRN IND/GROUP: HCPCS | Performed by: DIETITIAN, REGISTERED

## 2022-10-04 RX ORDER — BLOOD-GLUCOSE METER
EACH MISCELLANEOUS
Qty: 1 KIT | Refills: 0 | Status: SHIPPED | OUTPATIENT
Start: 2022-10-04 | End: 2023-02-14

## 2022-10-04 RX ORDER — LANCETS 33 GAUGE
EACH MISCELLANEOUS
Qty: 100 EACH | Refills: 6 | Status: SHIPPED | OUTPATIENT
Start: 2022-10-04 | End: 2023-02-14

## 2022-10-04 RX ORDER — BLOOD SUGAR DIAGNOSTIC
STRIP MISCELLANEOUS
Qty: 100 STRIP | Refills: 6 | Status: SHIPPED | OUTPATIENT
Start: 2022-10-04 | End: 2023-02-14

## 2022-10-04 NOTE — PROGRESS NOTES
Virtual Regular Visit    Verification of patient location:  [place], [place]    Patient is located in the following state in which I hold an active license [place]      Assessment/Plan:    Problem List Items Addressed This Visit    None              Reason for visit is   Chief Complaint   Patient presents with    Virtual Regular Visit        Encounter provider Trevor Salcedo    Provider located at [address] [place] [address]  [address] [facility]  [phone]      Recent Visits  No visits were found meeting these conditions  Showing recent visits within past 7 days and meeting all other requirements  Today's Visits  Date Type Provider Dept   10/04/22 1501 [facility]   Showing today's visits and meeting all other requirements  Future Appointments  No visits were found meeting these conditions  Showing future appointments within next 150 days and meeting all other requirements       The patient was identified by name and date of birth  Reji Rutherford was informed that this is a telemedicine visit and that the visit is being conducted through [address] [place] [address] Now and patient was informed that this is a secure, HIPAA-compliant platform  She agrees to proceed     My office door was closed  The patient was notified the following individuals were present in the room Student nurse  She acknowledged consent and understanding of privacy and security of the video platform  The patient has agreed to participate and understands they can discontinue the visit at any time  Patient is aware this is a billable service  Subjective  Reji Rutherford is a 33 y o  female pregnant patient         HPI     Past Medical History:   Diagnosis Date    Allergic     Asthma     Fibroadenoma of left breast        Past Surgical History:   Procedure Laterality Date    CYST REMOVAL N/A 2011    low back    WISDOM TOOTH EXTRACTION         Current Outpatient Medications   Medication Sig Dispense Refill    albuterol (PROVENTIL HFA,VENTOLIN HFA) 90 mcg/act inhaler Inhale 2 puffs every 4 (four) hours as needed for wheezing 8 g 0    azelastine (ASTELIN) 0 1 % nasal spray 1-2 sprays into each nostril 2 (two) times a day 1 mL 0    Blood Glucose Monitoring Suppl (OneTouch Verio Flex System) w/Device KIT Test 4 times daily 1 kit 0    budesonide (RINOCORT AQUA) 32 MCG/ACT nasal spray 1 spray into each nostril daily      Fluticasone-Salmeterol (Advair) 250-50 mcg/dose inhaler Inhale 1 puff daily 60 blister 0    montelukast (SINGULAIR) 10 mg tablet take 1 tablet by mouth nightly DURING SEASONS OF INCREASED ASTHMA SYMPTOMS (Patient not taking: No sig reported)      OneTouch Delica Lancets 88M MISC Test 4 times daily  100 each 6    OneTouch Verio test strip Test 4 times daily  100 strip 6    Prenatal MV-Min-Fe Fum-FA-DHA (PRENATAL+DHA PO) Take by mouth       No current facility-administered medications for this visit  Allergies   Allergen Reactions    Shellfish Allergy - Food Allergy Hives, Itching and Swelling    Aspirin Swelling     Swollen eyes       Review of Systems    Video Exam    There were no vitals filed for this visit  Physical Exam --not performed  I spent 60 minutes directly with the patient during this visit           Thank you for referring your patient to [facility] Maternal Fetal Medicine Diabetes in Pregnancy Program      Harper Mckeon is a  33 y o  female who presents today for Group  Class 1  Patient is at 21w0d gestation, Estimated Date of Delivery: 23  Reviewed and updated the following from patients medical record: PMH, Problem List, Allergies, and Current Medications      Visit Diagnosis:  Diet controlled GDM    Discussed with patient pathophysiology of GDM, untreated hyperglycemia in pregnancy and maternal fetal complications including fetal macrosomia,  hypoglycemia, polyhydramnios, increased incidence of  section,  labor, and in severe cases fetal demise and still birth   Discussed importance of blood glucose monitoring, nutrition, and medication if necessary in achieving BG goals  Additional Pregnancy Complications:  Obesity    Labs:    Lab Results   Component Value Date    [id number] 155 (H) 2022       Lab Results   Component Value Date    GLUF 89 2022    ELHQINO7FL 135 2022    [id number] 112 2022    [id number] 85 2022        No components found for: HGA1C    Medications:  No diabetes related medications    Anthropometrics:  Ht Readings from Last 3 Encounters:   22 5' 3" (1 6 m)   22 5' 3 25" (1 607 m)   22 5' 2 5" (1 588 m)     Wt Readings from Last 3 Encounters:   22 94 8 kg (209 lb)   22 94 6 kg (208 lb 9 6 oz)   22 93 4 kg (206 lb)     Pre-gravid weight: 95 6 kg (210 lb 12 8 oz)  Pre-gravid BMI: 37 35  Weight Change: -0 816 kg (-1 lb 12 8 oz)  Weight gain recommendations: BMI (> 30) 11-20 lbs  Comments: Patient may gain up to 20 pounds for the remainder of the pregnancy  Recent Ultra Sound Results:  Date: 22  Fetal Growth: Normal  RELL: Normal  Next US date: 10/18/22    Blood Glucose Monitoring:   Glucose Meter: OneTouch Verio Flex  Instructed on testing blood sugars: 4 x per day (Fasting, 2 hour after start of each meal)    Gave instruction on site selection, skin preparation, loading strips and lancet device, meter activation, obtaining blood sample, test strip and lancet disposal and storage, and recording log book entries  Patient has good understanding of material covered and was able to test their own blood sugar in office today       Instruction for reporting blood sugar results weekly via:  Phone: [phone]   OR  My Chart (Message with image attachment, or Glucose Flowsheet)    Goal Blood Sugar Ranges:   Fastin-90 mg/dL  1 hour after the start of each meal: 140 mg/dL or less  2 hours after start of each meal: 120 mg/dL or less    Meal Plan (daily calorie and protein needs):  Calories: 2000 calorie (CHO:45-15-60-15-60-30) (PRO: 2/3-1-3/4-1-3/4-2)    Type of Diet:Regular  Additional Nutrition Concerns: None    Meal Plan Tips:  1  Patient was provided with a meal plan including 3 meals and 3 snacks  2  Discussed appropriate amounts of CHO, PRO, and Fat at each meal and snack  3  Reviewed CHO exchange list, and portion sizes for both CHO and PRO via food models  4  Instruction on how to read a food label  5  Provided suggested meal/snack options to increase nutrition and maintain consistent meal and snack intakes  6  Instructed on how to keep a 3-day food diary to be brought to follow- up appointment  7  Encouraged  patient to eat every 2 0-3 5 hours while awake  8  Encouraged patient to go no longer than 8-10 hours fasting overnight until first meal of the day  Physical Activity:  Discussed benefits of physical activity to optimize blood glucose control, encouraged activity at patient is physically able  Always consult a physician prior to starting an exercise program  Recommend 20-30 minutes daily  Patient Stated Goal: "I will plan my meals and snacks every day"    Diabetes Self Management Support Plan outside of ongoing care: Spouse/Family    Learner/s Present:Learners Present: Patient  and Spouse  Barriers to Learning/Change: Cultural  Expected Compliance: good    Date to report blood sugars: Thursday, 10/13/22  Class 2 (date): Friday, 10/14/22    Begin Time: 8:55 AM  End Time: 10 AM    It was a pleasure working with them today  Please feel free to call with any questions or concerns      William Franklin  Diabetes Educator  [facility] Maternal Fetal Medicine  Diabetes in Pregnancy Program  [address]  [address]

## 2022-10-14 ENCOUNTER — TELEMEDICINE (OUTPATIENT)
Dept: PERINATAL CARE | Facility: CLINIC | Age: 32
End: 2022-10-14
Payer: COMMERCIAL

## 2022-10-14 DIAGNOSIS — O24.410 DIET CONTROLLED GESTATIONAL DIABETES MELLITUS (GDM) IN FIRST TRIMESTER: Primary | ICD-10-CM

## 2022-10-14 DIAGNOSIS — Z3A.22 22 WEEKS GESTATION OF PREGNANCY: ICD-10-CM

## 2022-10-14 DIAGNOSIS — O99.212 OBESITY COMPLICATING PREGNANCY, SECOND TRIMESTER: ICD-10-CM

## 2022-10-14 PROCEDURE — G0108 DIAB MANAGE TRN  PER INDIV: HCPCS | Performed by: DIETITIAN, REGISTERED

## 2022-10-14 NOTE — PROGRESS NOTES
Virtual Regular Visit    Verification of patient location: [place]    Patient is located in the following state in which I hold an active license [place]      Assessment/Plan:    Problem List Items Addressed This Visit    None              Reason for visit is   Chief Complaint   Patient presents with   • Virtual Regular Visit        Encounter provider Taj Alfaro    Provider located at [address] [place] [address] Dr [place] [address] [phone]      Recent Visits  No visits were found meeting these conditions  Showing recent visits within past 7 days and meeting all other requirements  Today's Visits  Date Type Provider Dept   10/14/22 [address] [place] [address]   Showing today's visits and meeting all other requirements  Future Appointments  No visits were found meeting these conditions  Showing future appointments within next 150 days and meeting all other requirements       The patient was identified by name and date of birth  Yi Rubalcava was informed that this is a telemedicine visit and that the visit is being conducted through [address] [place] [address] Now and patient was informed that this is a secure, HIPAA-compliant platform  She agrees to proceed     My office door was closed  No one else was in the room  She acknowledged consent and understanding of privacy and security of the video platform  The patient has agreed to participate and understands they can discontinue the visit at any time  Patient is aware this is a billable service  Subjective  Yi Rubalcava is a 33 y o  female pregnant patient        HPI     Past Medical History:   Diagnosis Date   • Allergic    • Asthma    • Fibroadenoma of left breast        Past Surgical History:   Procedure Laterality Date   • CYST REMOVAL N/A 2011    low back   • WISDOM TOOTH EXTRACTION         Current Outpatient Medications   Medication Sig Dispense Refill   • albuterol (PROVENTIL HFA,VENTOLIN HFA) 90 mcg/act inhaler Inhale 2 puffs every 4 (four) hours as needed for wheezing 8 g 0   • azelastine (ASTELIN) 0 1 % nasal spray 1-2 sprays into each nostril 2 (two) times a day 1 mL 0   • Blood Glucose Monitoring Suppl (OneTouch Verio Flex System) w/Device KIT Test 4 times daily 1 kit 0   • budesonide (RINOCORT AQUA) 32 MCG/ACT nasal spray 1 spray into each nostril daily     • Fluticasone-Salmeterol (Advair) 250-50 mcg/dose inhaler Inhale 1 puff daily 60 blister 0   • montelukast (SINGULAIR) 10 mg tablet take 1 tablet by mouth nightly DURING SEASONS OF INCREASED ASTHMA SYMPTOMS (Patient not taking: No sig reported)     • OneTouch Delica Lancets 70X MISC Test 4 times daily  100 each 6   • OneTouch Verio test strip Test 4 times daily  100 strip 6   • Prenatal MV-Min-Fe Fum-FA-DHA (PRENATAL+DHA PO) Take by mouth       No current facility-administered medications for this visit  Allergies   Allergen Reactions   • Shellfish Allergy - Food Allergy Hives, Itching and Swelling   • Aspirin Swelling     Swollen eyes       Review of Systems    Video Exam    There were no vitals filed for this visit  Physical Exam --not performed  I spent 60 minutes directly with the patient during this visit           Thank you for referring your patient to [facility] Maternal Fetal Medicine Diabetes in Pregnancy Program      Aaliyah Meza is a  33 y o  female who presents today for Individaul  Class 2  Patient is at 22w3d gestation, Estimated Date of Delivery: 2/14/23  Reviewed and updated the following from patients medical record: PMH, Problem List, Allergies, and Current Medications      Visit Diagnosis:  Diet controlled GDM    Additional Pregnancy Complications:  Obesity    Labs:    Lab Results   Component Value Date    [id number] 155 (H) 07/29/2022       Lab Results   Component Value Date    GLUF 89 09/23/2022    [id number] 135 09/23/2022    AIUGKFZ3BG 112 09/23/2022    YHLCEQR5UT 85 2022 FtV2f-6 4%    Medications:  No diabetes related medications    Anthropometrics:  Ht Readings from Last 3 Encounters:   22 5' 3" (1 6 m)   22 5' 3 25" (1 607 m)   22 5' 2 5" (1 588 m)     Wt Readings from Last 3 Encounters:   22 94 8 kg (209 lb)   22 94 6 kg (208 lb 9 6 oz)   22 93 4 kg (206 lb)     Pre-gravid weight: 95 6 kg (210 lb 12 8 oz)  Pre-gravid BMI: 37 35  Weight Change: -0 816 kg (-1 lb 12 8 oz)  Weight gain recommendations: BMI (> 30) 11-20 lbs  Comments: Patient may gain up to 20 pounds for the remainder of the pregnancy  Recent Ultra Sound Results:  Date:22  Fetal Growth:Normal  RELL: Normal  Next US date: 10/18/22    Blood Glucose Monitoring:  Reinforcement of blood glucose goals and reporting guidelines  Glucose Meter: OneTouch Verio Flex  Testing blood sugars: 4 x per day (Fasting, 2 hour after start of each meal)  Method of reporting blood sugars:Glucose Flowsheet    Report blood sugar results weekly via:  Phone: [phone]   OR  My Chart (Message with image attachment, or Glucose Flowsheet)    Goal Blood Sugar Ranges:   Fastin-90 mg/dL  1 hour after the start of each meal: 140 mg/dL or less  2 hours after start of each meal: 120 mg/dL or less      BG Log:  Review of blood glucose log  Discussed at Class 2 today  FBS today 10/14/22-98  Advised patient to change to 1 CHO serving  (15 gm) & increase to 2-3 oz protein  Patient is aware she may need to begin medicine soon  Meal Plan:  Calories: 2000 calorie (UBY:69-69-70-63-50-24) (PRO: 2/3-1-3/4-1-3/4-2)    Diet Type: Low Carbohydrate  Additional Nutrition concerns: drinks unsweetened almond milk Uses Sugar-free pudding  24 hr Diet Recall:  Provided at this appointment    Diet review: Counts CHO gm as accustomed to couting CHO gm from her  who has Type 1 Diabetes & wears an insulin pump   Reports she has been eating the same foods daily as works best for her  Diet recall indicates she is following the meal plan closely  Diet Instruction:  The patient was instructed on the followin  Individualized meal plan  2  Importance of consistent carbohydrate intake via 3 meals and 3 snacks per day   3  Importance of protein as it relates to blood glucose control  4  Encouraged  patient to eat every 2 0-3 5 hours while awake  5  Encouraged patient to go no longer than 8-10 hours fasting overnight until first meal of the day  6  Provided suggested meal/snack options to increase nutrition and maintain consistent meal and snack intakes  Physical Activity:  Discussed benefits of physical activity to optimize blood glucose control, encouraged activity at patient is physically able  Always consult a physician prior to starting an exercise program  Recommend 20-30 minutes daily  Is patient physically active? Yes Has tried walking in [employer] for 20 minutes at night  Advised her to walk after dinner to lower her FBS    Sick day Guidelines:   Patient advised that sickness will raise blood sugar and need to continue medication regimen as directed  If blood sugar is > 160 mg/dL twice in one day call doctor  Instructed on what to do when unable to consume normal meal plan  Hypoglycemia & Treatment Guidelines:  Reviewed what hypoglycemia is, signs and symptoms, and how to treat via the 15:15 rule  Post-Partum Guidelines:  Completion of 75 gm CHO 2 hr gtt at 6 weeks post-partum to check for Type 2 DM diagnosis    Breastfeeding Guidelines:  Continue GDM meal plan plus additional 350-500 calories daily  Stay hydrated by drinking 8-10 (8 oz ) fluids daily  Examples of protein and carbohydrate snacks provided  Dining Out & Travel Guidelines:  Patient advised to be prepared with extra diabetes supplies, medications, and snacks, as well as sticking to the same time schedule and portions eaten at home for meals and snacks      Maternal-Fetal Testing:    Ultrasounds- growth scans every 4 weeks  NST- twice weekly starting at 32nd week GA   RELL- weekly starting at 32 weeks GA    Patient Stated Goal: "I will plan my meals and snacks every day"  Goal Assessment: On track    Diabetes Self Management Support Plan outside of ongoing care: Spouse/Family    Learner/s Present:Learners Present: Patient   Barriers to Learning/Change: No Barriers  Expected Compliance: very good    Date to report blood sugars: Weekly  Follow up date: As Needed  Begin Time: 1:05 PM  End Time: 2:02 PM    It was a pleasure working with them today  Please feel free to call with any questions or concerns      John Posey  Diabetes Educator  [facility] Maternal Fetal Medicine  Diabetes in Pregnancy Program  [address] [facility], [address] [place] [address],[address]  [place], [address] [place] [address]

## 2022-10-17 NOTE — ASSESSMENT & PLAN NOTE
AFP ordered today  Level 2 scheduled  NIPT negative  Pt aware to call office with any VB, LOF, severe cramping/abdominal pain      RTO in 4 wks for routine OB Cardiac Cath Lab Recovery Arrival Note:      Sidra Jose arrived to Cardiac Cath Lab, Recovery Area. Staff introduced to patient. Patient identifiers verified with NAME and DATE OF BIRTH. Procedure verified with patient. Consent forms reviewed and signed by patient or authorized representative and verified. Allergies verified. Patient informed of procedure and plan of care. Questions answered with review. Patient prepped for procedure, per orders from physician, prior to arrival.    Patient on cardiac monitor, non-invasive blood pressure, SPO2 monitor. Patient is A&Ox 4. Patient reports no complaints. Patient in stretcher, in low position, with side rails up, call bell within reach, patient instructed to call of assistance as needed. Patient prep in: [facility] Recovery Area, Bed# FT 6. Family in: waiting room. Prep by: ASAEL Mead

## 2022-10-18 ENCOUNTER — ROUTINE PRENATAL (OUTPATIENT)
Dept: PERINATAL CARE | Facility: OTHER | Age: 32
End: 2022-10-18
Payer: COMMERCIAL

## 2022-10-18 VITALS
HEART RATE: 90 BPM | SYSTOLIC BLOOD PRESSURE: 112 MMHG | HEIGHT: 63 IN | WEIGHT: 209.6 LBS | DIASTOLIC BLOOD PRESSURE: 60 MMHG | BODY MASS INDEX: 37.14 KG/M2

## 2022-10-18 DIAGNOSIS — Z3A.23 23 WEEKS GESTATION OF PREGNANCY: Primary | ICD-10-CM

## 2022-10-18 DIAGNOSIS — O24.410 DIET CONTROLLED GESTATIONAL DIABETES MELLITUS (GDM) IN SECOND TRIMESTER: ICD-10-CM

## 2022-10-18 PROCEDURE — 76825 ECHO EXAM OF FETAL HEART: CPT | Performed by: OBSTETRICS & GYNECOLOGY

## 2022-10-18 PROCEDURE — 93325 DOPPLER ECHO COLOR FLOW MAPG: CPT | Performed by: OBSTETRICS & GYNECOLOGY

## 2022-10-18 PROCEDURE — 76827 ECHO EXAM OF FETAL HEART: CPT | Performed by: OBSTETRICS & GYNECOLOGY

## 2022-10-18 NOTE — LETTER
October 18, 2022     Joie Cedeño, [address]    Patient: Shara Kirk   YOB: 1990   Date of Visit: 10/18/2022       Dear Dr Sheldon Bautista: Thank you for referring Shara Kirk to me for evaluation  Below are my notes for this consultation  If you have questions, please do not hesitate to call me  I look forward to following your patient along with you  Sincerely,        Rolanda Drake MD        CC: No Recipients  Rolanda Drake MD  10/15/2022  7:43 PM  Sign when Signing Visit  Please refer to the [facility] ultrasound report in Ob Procedures for additional information regarding today's visit

## 2022-10-26 ENCOUNTER — ROUTINE PRENATAL (OUTPATIENT)
Dept: OBGYN CLINIC | Facility: CLINIC | Age: 32
End: 2022-10-26

## 2022-10-26 VITALS — SYSTOLIC BLOOD PRESSURE: 118 MMHG | BODY MASS INDEX: 36.95 KG/M2 | DIASTOLIC BLOOD PRESSURE: 68 MMHG | WEIGHT: 208.6 LBS

## 2022-10-26 DIAGNOSIS — O24.410 DIET CONTROLLED GESTATIONAL DIABETES MELLITUS (GDM) IN FIRST TRIMESTER: Primary | ICD-10-CM

## 2022-10-26 DIAGNOSIS — Z34.92 PRENATAL CARE IN SECOND TRIMESTER: ICD-10-CM

## 2022-10-26 DIAGNOSIS — Z3A.24 24 WEEKS GESTATION OF PREGNANCY: ICD-10-CM

## 2022-10-26 LAB
SL AMB  POCT GLUCOSE, UA: NORMAL
SL AMB POCT URINE PROTEIN: NORMAL

## 2022-10-26 NOTE — PROGRESS NOTES
Feels well, she has no complaints   Nl FM  Denies any bleeding or cramping   Flu vaccine given today   28 wk labs given today

## 2022-10-30 PROBLEM — Z3A.24 24 WEEKS GESTATION OF PREGNANCY: Status: ACTIVE | Noted: 2022-08-03

## 2022-10-30 NOTE — ASSESSMENT & PLAN NOTE
28wk labs ordered  Flu vaccine given  Encouraged her to report BS to DP  Fasting blood sugars have been elevated

## 2022-10-30 NOTE — PROGRESS NOTES
Problem List Items Addressed This Visit        Endocrine    Diet controlled gestational diabetes mellitus (GDM) in first trimester - Primary       Other    24 weeks gestation of pregnancy     28wk labs ordered  Flu vaccine given  Encouraged her to report BS to DP  Fasting blood sugars have been elevated              Other Visit Diagnoses     Prenatal care in second trimester        Relevant Orders    POCT urine dip (Completed)    RPR    CBC and differential    Anemia Panel w/Reflex, OB    influenza vaccine, quadrivalent, 0 5 mL, preservative-free, for adult and pediatric patients 6 mos+ (AFLURIA, FLUARIX, FLULAVAL, FLUZONE) (Completed)

## 2022-11-08 ENCOUNTER — DOCUMENTATION (OUTPATIENT)
Dept: PERINATAL CARE | Facility: CLINIC | Age: 32
End: 2022-11-08

## 2022-11-08 DIAGNOSIS — O24.419 GESTATIONAL DIABETES MELLITUS (GDM) IN SECOND TRIMESTER, GESTATIONAL DIABETES METHOD OF CONTROL UNSPECIFIED: Primary | ICD-10-CM

## 2022-11-08 DIAGNOSIS — Z3A.26 26 WEEKS GESTATION OF PREGNANCY: ICD-10-CM

## 2022-11-08 NOTE — PROGRESS NOTES
Blood Sugar Log  Method of Reporting: [employer] Glucose Flowsheet   Date: 22    Patient: Theo Dao  :     Estimated Date of Delivery: 23  GA: 26w0d     Reason for Documentation: Blood Sugar Log (10/10-) and Gestational Diabetes (26w0d)     ASSESSMENT - REVIEW OF BG LOG     BG Log:         Assessment:  • FBG: Not Well Controlled; Consistently Elevated (>90mg/dl)   • PPBG: Well Controlled (<120mg/dl)    PLAN     DIET:   • Continue Assigned Meal Plan (including 3 meals and 3 snacks): 2000 calorie (CHO: 45-15-60-15-60-30) (PRO: 2/3-1-3/-1-3/4-2)     (10/14) per Barbara Klinefelter, RD CDE  ·  Advised patient to change to 1 CHO serving  (15 gm) & increase to 2-3 oz protein  · Counts CHO gm as accustomed to couting CHO gm from her  who has Type 1 Diabetes & wears an insulin pump  Reports she has been eating the same foods daily as works best for her  Diet recall indicates she is following the meal plan closely  · drinks unsweetened almond milk Uses Sugar-free pudding  BLOOD SUGAR MONITORING: (Glucometer: OneTouch Verio Flex)  • Continue Testing B x per day (Fasting, 2 hour after start of each meal)    PHYSICAL ACTIVITY:  • Continue walking (or other preferred physical activity) daily - recommend at least 20-30 minutes daily, preferably after dinner if able (unless otherwise instructed per OB)      (10/14) per Barbara Klinefelter, RD CDE  ·  Has tried walking in [place] for 20 minutes at night   Advised her to walk after dinner to lower her FBS    MONITORING     EDUCATION: (Diabetes in Pregnancy Program)    Completed: Class 1 (Pt Goal: "I will plan my meals and snacks every day") and Class 2    Needs Scheduled: F/UP DSMT with MIKHAIL     () per Rosalia Monteiro RD  · Message sent to patient to schedule f/up appointment to review BG and discuss medications r/t elevated FBG    WEIGHT:     Pre-Gravid Wt Pre-Gravid BMI TWG   95 6 kg (210 lb 12 8 oz) 37 35 -0 998 kg (-2 lb 3 2 oz) FETAL MONITORITNG - ULTRASOUNDS  • Recent Ultrasounds Findings: NML Growth/RELL per OB   o US Follow-Ups: Scheduled Appropriately  • Further Fetal Surveillance: Beginning at 32 weeks (NST twice weekly + RELL once a week) = Not indicated at this time but will be indicated if insulin initiated    LABS  Lab Results   Component Value Date/Time    [id number] 155 (H) 07/29/2022 02:00 PM    GLUF 89 09/23/2022 10:57 AM    [id number] 135 09/23/2022 12:23 PM    GHNOKLJ3DY 112 09/23/2022 01:32 PM    [id number] 85 09/23/2022 02:24 PM    HGBA1C 5 4 09/23/2022 10:57 AM       Active Orders (needing to be collected):  A1C and CMP     (11/8) per Zac Peterson RD  · Ordered A1C + CMP;  Message sent to patient to complete at earliest [address]   Diabetes in Pregnancy Program   Maternal Fetal Medicine  [facility]

## 2022-11-10 ENCOUNTER — APPOINTMENT (OUTPATIENT)
Dept: LAB | Facility: CLINIC | Age: 32
End: 2022-11-10

## 2022-11-10 DIAGNOSIS — Z34.92 PRENATAL CARE IN SECOND TRIMESTER: ICD-10-CM

## 2022-11-10 LAB
ALBUMIN SERPL BCP-MCNC: 2.9 G/DL (ref 3.5–5)
ALP SERPL-CCNC: 70 U/L (ref 46–116)
ALT SERPL W P-5'-P-CCNC: 17 U/L (ref 12–78)
ANION GAP SERPL CALCULATED.3IONS-SCNC: 6 MMOL/L (ref 4–13)
AST SERPL W P-5'-P-CCNC: 10 U/L (ref 5–45)
BASOPHILS # BLD AUTO: 0.02 THOUSANDS/ÂΜL (ref 0–0.1)
BASOPHILS NFR BLD AUTO: 0 % (ref 0–1)
BILIRUB SERPL-MCNC: 0.25 MG/DL (ref 0.2–1)
BUN SERPL-MCNC: 6 MG/DL (ref 5–25)
CALCIUM ALBUM COR SERPL-MCNC: 10.1 MG/DL (ref 8.3–10.1)
CALCIUM SERPL-MCNC: 9.2 MG/DL (ref 8.3–10.1)
CHLORIDE SERPL-SCNC: 108 MMOL/L (ref 96–108)
CO2 SERPL-SCNC: 22 MMOL/L (ref 21–32)
CREAT SERPL-MCNC: 0.72 MG/DL (ref 0.6–1.3)
EOSINOPHIL # BLD AUTO: 0.1 THOUSAND/ÂΜL (ref 0–0.61)
EOSINOPHIL NFR BLD AUTO: 1 % (ref 0–6)
ERYTHROCYTE [DISTWIDTH] IN BLOOD BY AUTOMATED COUNT: 13.2 % (ref 11.6–15.1)
EST. AVERAGE GLUCOSE BLD GHB EST-MCNC: 105 MG/DL
FERRITIN SERPL-MCNC: 8 NG/ML (ref 8–388)
GFR SERPL CREATININE-BSD FRML MDRD: 109 ML/MIN/1.73SQ M
GLUCOSE SERPL-MCNC: 137 MG/DL (ref 65–140)
HBA1C MFR BLD: 5.3 %
HCT VFR BLD AUTO: 31.5 % (ref 34.8–46.1)
HGB BLD-MCNC: 10.2 G/DL (ref 11.5–15.4)
IMM GRANULOCYTES # BLD AUTO: 0.09 THOUSAND/UL (ref 0–0.2)
IMM GRANULOCYTES NFR BLD AUTO: 1 % (ref 0–2)
LYMPHOCYTES # BLD AUTO: 1.76 THOUSANDS/ÂΜL (ref 0.6–4.47)
LYMPHOCYTES NFR BLD AUTO: 16 % (ref 14–44)
MCH RBC QN AUTO: 29.7 PG (ref 26.8–34.3)
MCHC RBC AUTO-ENTMCNC: 32.4 G/DL (ref 31.4–37.4)
MCV RBC AUTO: 92 FL (ref 82–98)
MONOCYTES # BLD AUTO: 0.44 THOUSAND/ÂΜL (ref 0.17–1.22)
MONOCYTES NFR BLD AUTO: 4 % (ref 4–12)
NEUTROPHILS # BLD AUTO: 8.59 THOUSANDS/ÂΜL (ref 1.85–7.62)
NEUTS SEG NFR BLD AUTO: 78 % (ref 43–75)
NRBC BLD AUTO-RTO: 0 /100 WBCS
PLATELET # BLD AUTO: 324 THOUSANDS/UL (ref 149–390)
PMV BLD AUTO: 8.9 FL (ref 8.9–12.7)
POTASSIUM SERPL-SCNC: 3.5 MMOL/L (ref 3.5–5.3)
PROT SERPL-MCNC: 6.4 G/DL (ref 6.4–8.4)
RBC # BLD AUTO: 3.44 MILLION/UL (ref 3.81–5.12)
SODIUM SERPL-SCNC: 136 MMOL/L (ref 135–147)
WBC # BLD AUTO: 11 THOUSAND/UL (ref 4.31–10.16)

## 2022-11-11 LAB — RPR SER QL: NORMAL

## 2022-11-14 ENCOUNTER — TELEMEDICINE (OUTPATIENT)
Dept: PERINATAL CARE | Facility: CLINIC | Age: 32
End: 2022-11-14

## 2022-11-14 DIAGNOSIS — Z3A.26 26 WEEKS GESTATION OF PREGNANCY: ICD-10-CM

## 2022-11-14 DIAGNOSIS — O24.414 INSULIN CONTROLLED GESTATIONAL DIABETES MELLITUS (GDM) IN SECOND TRIMESTER: Primary | ICD-10-CM

## 2022-11-14 DIAGNOSIS — O99.212 OBESITY COMPLICATING PREGNANCY, SECOND TRIMESTER: ICD-10-CM

## 2022-11-14 RX ORDER — INSULIN DETEMIR 100 [IU]/ML
20 INJECTION, SOLUTION SUBCUTANEOUS
Qty: 15 ML | Refills: 0 | Status: SHIPPED | OUTPATIENT
Start: 2022-11-14

## 2022-11-14 RX ORDER — PEN NEEDLE, DIABETIC 30 GX3/16"
NEEDLE, DISPOSABLE MISCELLANEOUS
Qty: 100 EACH | Refills: 3 | Status: SHIPPED | OUTPATIENT
Start: 2022-11-14 | End: 2023-02-14

## 2022-11-14 NOTE — PROGRESS NOTES
Virtual Regular Visit    Verification of patient location:  [place], [place]    Patient is located in the following state in which I hold an active license [place]      Assessment/Plan:    Problem List Items Addressed This Visit    None              Reason for visit is   Chief Complaint   Patient presents with   • Virtual Regular Visit        Encounter provider Dorothea Smith    Provider located at [address] [place] [id number] [phone]      Recent Visits  No visits were found meeting these conditions  Showing recent visits within past 7 days and meeting all other requirements  Today's Visits  Date Type Provider Dept   11/14/22 [address] [place] [address]   Showing today's visits and meeting all other requirements  Future Appointments  No visits were found meeting these conditions  Showing future appointments within next 150 days and meeting all other requirements       The patient was identified by name and date of birth  Shara Kirk was informed that this is a telemedicine visit and that the visit is being conducted through the [employer]  She agrees to proceed     My office door was closed  The patient was notified the following individuals were present in the room a medical student     She acknowledged consent and understanding of privacy and security of the video platform  The patient has agreed to participate and understands they can discontinue the visit at any time  Patient is aware this is a billable service  Subjective  Shara Kirk is a 27 y o  female pregnant patient        HPI     Past Medical History:   Diagnosis Date   • Allergic    • Asthma    • Fibroadenoma of left breast        Past Surgical History:   Procedure Laterality Date   • CYST REMOVAL N/A 2011    low back   • WISDOM TOOTH EXTRACTION         Current Outpatient Medications   Medication Sig Dispense Refill   • albuterol (PROVENTIL HFA,VENTOLIN HFA) 90 mcg/act inhaler Inhale 2 puffs every 4 (four) hours as needed for wheezing 8 g 0   • azelastine (ASTELIN) 0 1 % nasal spray 1-2 sprays into each nostril 2 (two) times a day 1 mL 0   • Blood Glucose Monitoring Suppl (OneTouch Verio Flex System) w/Device KIT Test 4 times daily 1 kit 0   • budesonide (RINOCORT AQUA) 32 MCG/ACT nasal spray 1 spray into each nostril daily     • Fluticasone-Salmeterol (Advair) 250-50 mcg/dose inhaler Inhale 1 puff daily 60 blister 0   • OneTouch Delica Lancets 77G MISC Test 4 times daily  100 each 6   • OneTouch Verio test strip Test 4 times daily  100 strip 6   • Prenatal MV-Min-Fe Fum-FA-DHA (PRENATAL+DHA PO) Take by mouth       No current facility-administered medications for this visit  Allergies   Allergen Reactions   • Shellfish Allergy - Food Allergy Hives, Itching and Swelling   • Aspirin Swelling     Swollen eyes       Review of Systems    Video Exam    There were no vitals filed for this visit  Physical Exam --not performed  I spent 30 minutes directly with the patient during this visit         Thank you for referring your patient to [facility] Maternal Fetal Medicine Diabetes in Pregnancy Program      Bing Ochoa is a  27 y o  female who presents today for Insulin Teaching  Patient is at 26w6d gestation, Estimated Date of Delivery: 2/14/23  Please refer to blood sugar log under encounter tab for complete documentation of patient blood glucose levels    (has Type 1 diabetes) also attended  Reviewed and updated the following from patients medical record: PMH, Problem List, Allergies, and Current Medications        Visit Diagnosis:  Insulin controlled GDM    Labs:  11/10/22 HsA0r-4 3%    Anthropometrics:  Ht Readings from Last 3 Encounters:   10/18/22 5' 3" (1 6 m)   09/28/22 5' 3" (1 6 m)   09/02/22 5' 3 25" (1 607 m)     Wt Readings from Last 3 Encounters:   10/26/22 94 6 kg (208 lb 9 6 oz)   10/18/22 95 1 kg (209 lb 9 6 oz)   22 94 8 kg (209 lb)     Pre-gravid weight: 95 6 kg (210 lb 12 8 oz)  Pre-gravid BMI: 37 35  Weight Change: -0 998 kg (-2 lb 3 2 oz)  Weight gain recommendations: BMI (> 30) 11-20 lbs  Comments: Patient needs to maintain her weight for the remainder of the pregnancy  Recent Ultra Sound Results:  Date: 22 growth scan & 10/18/22 Echo  Fetal Growth: Normal  RELL: Normal  Next US date: 22    BG Log:        Insulin Education:    Levemir Begin 20 units at bedtime (9-10 PM)     The patient was instructed on the following:  • Insulin administration times, insulin action  • Hypoglycemia signs, symptoms and treatment  Advised patient to test blood sugar at 3:00 am for first 3 mornings following insulin start to monitor for hypoglycemia  • Increase in maternal-fetal surveillance with insulin initiation  • Side effects of hyperglycemia in pregnancy including macrosomia,  hypoglycemia, polyhydramnios, pre-term labor and stillbirth  • Continue to monitor blood glucoses via fingerstick fasting (goal 60 mg/dl to 90 mg/dl) and two hours post prandial (goal less than 120 mg/dl)  • Non-stress testing two times weekly and RELL testing beginning at 32 weeks gestation  • Ultrasounds every 4 weeks at the [facility] Fetal Medicine  • HbA1c every 6 to 8 weeks  • Explained the differences for insulin with Type 1 vs gestational diabetes for  to better understand  Patient Stated Goal: "I will plan my meals and snacks every day"  Goal Assessment: On track    Diabetes Self Management Support Plan outside of ongoing care: Spouse/Family    Date to report blood sugars: Weekly  Follow Up Date: as needed      Begin Time: 1:30 PM  End Time: 2:10 PM    Mikel David  Diabetes Educator  [facility] Maternal Fetal Medicine  Diabetes in Pregnancy Program  [address] [place] [address],[address]  [address]

## 2022-11-16 ENCOUNTER — TELEPHONE (OUTPATIENT)
Dept: OBGYN CLINIC | Facility: CLINIC | Age: 32
End: 2022-11-16

## 2022-11-16 NOTE — TELEPHONE ENCOUNTER
----- Message from Marie Rock MD sent at 11/15/2022  7:30 PM EST -----  Please let Anabel Arthur know that she has some anemia - from iron deficiency  Should add iron supplement daily

## 2022-11-18 NOTE — PROGRESS NOTES
28 wk Labs completed  Started Iron due to anemia  HX: GDM - Reporting Sugars weekly  UTD: FLU , Covid vaccines  Yellow Folder Given  Delivery Consent signed  Denies LOF, VB, CTX  Pubic bone pain- especially when sitting for a long period of time    +FM  Breast Pump ordered today

## 2022-11-21 ENCOUNTER — ROUTINE PRENATAL (OUTPATIENT)
Dept: OBGYN CLINIC | Facility: CLINIC | Age: 32
End: 2022-11-21

## 2022-11-21 VITALS — WEIGHT: 209.4 LBS | BODY MASS INDEX: 37.09 KG/M2 | SYSTOLIC BLOOD PRESSURE: 122 MMHG | DIASTOLIC BLOOD PRESSURE: 62 MMHG

## 2022-11-21 DIAGNOSIS — Z3A.27 27 WEEKS GESTATION OF PREGNANCY: ICD-10-CM

## 2022-11-21 DIAGNOSIS — Z34.03 ENCOUNTER FOR SUPERVISION OF NORMAL FIRST PREGNANCY IN THIRD TRIMESTER: Primary | ICD-10-CM

## 2022-11-21 DIAGNOSIS — O24.414 INSULIN CONTROLLED GESTATIONAL DIABETES MELLITUS (GDM) IN THIRD TRIMESTER: ICD-10-CM

## 2022-11-21 LAB
SL AMB  POCT GLUCOSE, UA: NORMAL
SL AMB POCT URINE PROTEIN: NORMAL

## 2022-11-21 RX ORDER — DOXYCYCLINE HYCLATE 50 MG/1
324 CAPSULE, GELATIN COATED ORAL
COMMUNITY

## 2022-11-21 NOTE — PROGRESS NOTES
Good FM  Compliant with Diabetic Pathways; now on insulin  Discussed growth sonos and  surveillance  Delivery consent signed  RTO 2 weeks

## 2022-11-23 ENCOUNTER — ULTRASOUND (OUTPATIENT)
Dept: PERINATAL CARE | Facility: CLINIC | Age: 32
End: 2022-11-23

## 2022-11-23 ENCOUNTER — TELEPHONE (OUTPATIENT)
Dept: PULMONOLOGY | Facility: CLINIC | Age: 32
End: 2022-11-23

## 2022-11-23 VITALS
HEART RATE: 88 BPM | BODY MASS INDEX: 37.16 KG/M2 | WEIGHT: 209.7 LBS | DIASTOLIC BLOOD PRESSURE: 62 MMHG | HEIGHT: 63 IN | SYSTOLIC BLOOD PRESSURE: 100 MMHG

## 2022-11-23 DIAGNOSIS — Z3A.28 28 WEEKS GESTATION OF PREGNANCY: Primary | ICD-10-CM

## 2022-11-23 DIAGNOSIS — O24.414 INSULIN CONTROLLED GESTATIONAL DIABETES MELLITUS (GDM) IN THIRD TRIMESTER: ICD-10-CM

## 2022-11-23 NOTE — PROGRESS NOTES
The patient was seen today for an ultrasound  Please see ultrasound report (located under Ob Procedures) for additional details  Thank you very much for allowing us to participate in the care of this very nice patient  Should you have any questions, please do not hesitate to contact me  Hector Mora MD [id number]  Attending Physician, [place]

## 2022-11-28 ENCOUNTER — DOCUMENTATION (OUTPATIENT)
Dept: PERINATAL CARE | Facility: CLINIC | Age: 32
End: 2022-11-28

## 2022-11-28 NOTE — PROGRESS NOTES
Blood Sugar Log  Method of Reporting: [employer] Glucose Flowsheet   Date: 22    Patient: Gregor Aguilar  :    DX: Insulin controlled gestational diabetes    Estimated Date of Delivery: 23  GA: 28w6d     Reason for Documentation: No chief complaint on file  ASSESSMENT - REVIEW OF BG LOG          PLAN     MEDS:  Increase 20 to 22 units Levemir at 9-10 PM  Began Levemir on 11/15/22     DIET:   • Continue Assigned Meal Plan (including 3 meals and 3 snacks): 2000 calorie (CHO: 45-15-60-15-60-30) (PRO: 2/3-1-3/4-1-3/4-2)   ·  Advised patient to change to 1 CHO serving  (15 gm) & increase to 2-3 oz protein  · Counts CHO gm as accustomed to couting CHO gm from her  who has Type 1 Diabetes & wears an insulin pump  Reports she has been eating the same foods daily as works best for her  Diet recall indicates she is following the meal plan closely  · drinks unsweetened almond milk Uses Sugar-free pudding  BLOOD SUGAR MONITORING: (Glucometer: OneTouch Verio Flex)  • Continue Testing B x per day (Fasting, 2 hour after start of each meal)    PHYSICAL ACTIVITY:  ·  Has tried walking in [employer] for 20 minutes at night   Advised her to walk after dinner to lower her FBS    MONITORING     EDUCATION: (Diabetes in Pregnancy Program)    Completed: Class 1 (Pt Goal: "I will plan my meals and snacks every day") and Class 2    WEIGHT:     Pre-Gravid Wt Pre-Gravid BMI TWG   95 6 kg (210 lb 12 8 oz) 37 35 -0 499 kg (-1 lb 1 6 oz)     FETAL MONITORITNG - ULTRASOUNDS  22 -Normal growth & fluids  Next:     LABS  Lab Results   Component Value Date/Time    [id number] 155 (H) 2022 02:00 PM    GLUF 89 2022 10:57 AM    [id number] 135 2022 12:23 PM    UOMLBFJ7RY 515 2022 01:32 PM    XKLPERS4YE 85 2022 02:24 PM    HGBA1C 5 3 11/10/2022 11:06 AM     Date to report next: Weekly    Clarnce Panchito, MS, RD, CDE   Diabetes in Pregnancy Program   Maternal Fetal [facility] [address]

## 2022-11-30 DIAGNOSIS — J45.20 MILD INTERMITTENT ASTHMA WITHOUT COMPLICATION: ICD-10-CM

## 2022-12-01 RX ORDER — FLUTICASONE PROPIONATE AND SALMETEROL 50; 250 UG/1; UG/1
POWDER RESPIRATORY (INHALATION)
Qty: 60 BLISTER | Refills: 0 | Status: SHIPPED | OUTPATIENT
Start: 2022-12-01

## 2022-12-01 RX ORDER — AZELASTINE HYDROCHLORIDE 137 UG/1
SPRAY, METERED NASAL
Qty: 30 ML | Refills: 0 | Status: SHIPPED | OUTPATIENT
Start: 2022-12-01

## 2022-12-02 RX ORDER — ALBUTEROL SULFATE 90 UG/1
AEROSOL, METERED RESPIRATORY (INHALATION)
Qty: 8.5 G | Refills: 0 | Status: SHIPPED | OUTPATIENT
Start: 2022-12-02

## 2022-12-06 ENCOUNTER — DOCUMENTATION (OUTPATIENT)
Dept: PERINATAL CARE | Facility: CLINIC | Age: 32
End: 2022-12-06

## 2022-12-06 NOTE — PROGRESS NOTES
Blood Sugar Log  Method of Reporting: 99 Fahrenheit Glucose Flowsheet   Date: 22    Patient: Laci Copeland  :    DX: Insulin controlled gestational diabetes    Estimated Date of Delivery: 23  GA: 30w0d     Reason for Documentation: No chief complaint on file  ASSESSMENT - REVIEW OF BG LOG                      PLAN     MEDS:      Increase 22 to 24 units Levemir at 9-10 PM daily       (22) Levemir increased from 20 to 22 units     (11/15/22) Levemir started         DIET:   • Continue Assigned Meal Plan (including 3 meals and 3 snacks): 2000 calorie (CHO: 45-15-60-15-60-30) (PRO: 3-1-3/4-1-3/4-2)   ·  Advised patient to change to 1 CHO serving  (15 gm) & increase to 2-3 oz protein  · Counts CHO gm as accustomed to couting CHO gm from her  who has Type 1 Diabetes & wears an insulin pump  Reports she has been eating the same foods daily as works best for her  Diet recall indicates she is following the meal plan closely  · drinks unsweetened almond milk Uses Sugar-free pudding  BLOOD SUGAR MONITORING: (Glucometer: OneTouch Verio Flex)  • Continue Testing B x per day (Fasting, 2 hour after start of each meal)    PHYSICAL ACTIVITY:  ·  Has tried walking in place for 20 minutes at night   Advised her to walk after dinner to lower her FBS    MONITORING     EDUCATION: (Diabetes in Pregnancy Program)    Completed: Class 1 (Pt Goal: "I will plan my meals and snacks every day") and Class 2    WEIGHT:     Pre-Gravid Wt Pre-Gravid BMI TWG   95 6 kg (210 lb 12 8 oz) 37 35 -0 499 kg (-1 lb 1 6 oz)     FETAL MONITORITNG - ULTRASOUNDS  22 -Normal growth & RELL  Next: 22    LABS  Lab Results   Component Value Date/Time    [id number] 155 (H) 2022 02:00 PM    GLUF 89 2022 10:57 AM    QJORHLS2MJ 135 2022 12:23 PM    UNTBRHJ3ZQ 112 2022 01:32 PM    EOGUEYJ0IC 85 2022 02:24 PM    HGBA1C 5 3 11/10/2022 11:06 AM     Date to report next: Weekly    Lisa Antonio RN  Diabetes in Pregnancy Program   Maternal Fetal Medicine  [address]

## 2022-12-07 ENCOUNTER — ROUTINE PRENATAL (OUTPATIENT)
Dept: OBGYN CLINIC | Facility: CLINIC | Age: 32
End: 2022-12-07

## 2022-12-07 VITALS — WEIGHT: 210.8 LBS | BODY MASS INDEX: 37.34 KG/M2 | SYSTOLIC BLOOD PRESSURE: 96 MMHG | DIASTOLIC BLOOD PRESSURE: 60 MMHG

## 2022-12-07 DIAGNOSIS — O99.013 ANEMIA AFFECTING PREGNANCY IN THIRD TRIMESTER: ICD-10-CM

## 2022-12-07 DIAGNOSIS — Z34.03 PRENATAL CARE, FIRST PREGNANCY, THIRD TRIMESTER: Primary | ICD-10-CM

## 2022-12-07 DIAGNOSIS — Z23 NEED FOR TDAP VACCINATION: ICD-10-CM

## 2022-12-07 DIAGNOSIS — Z3A.30 30 WEEKS GESTATION OF PREGNANCY: ICD-10-CM

## 2022-12-07 DIAGNOSIS — O24.414 INSULIN CONTROLLED GESTATIONAL DIABETES MELLITUS (GDM) IN THIRD TRIMESTER: ICD-10-CM

## 2022-12-07 NOTE — PROGRESS NOTES
Problem List Items Addressed This Visit        Endocrine    Insulin controlled gestational diabetes mellitus (GDM) in third trimester     Bedtime Levimir increased yesterday due to persistently elevated fasting glucose  Encouraged continued communication with DPP  F/u growth and initiation of twice weekly APFS scheduled at 32 weeks  Encouraged to continue following dietary recommendations and regular physical activity  TWG 0  Pregravid BMI 37      Lab Results   Component Value Date    HGBA1C 5 3 11/10/2022               Other    30 weeks gestation of pregnancy     Ludivina Díaz  is a 27 y o  [facility] @30w1d who presents for routine prenatal visit  28 wk labs - anemia taking iron, RPR neg   TDAP given today  Flu vaccine previously received  Plans to breastfeed  Pump - ordered   Delivery consent signed   Has yellow packet  Reports good fetal movement  Denies LOF, vaginal bleeding, regular uterine contractions, cramping, headaches or visual changes  Reviewed PTL precautions and FKC  Anemia affecting pregnancy in third trimester     Tolerating PO iron  For repeat CBC in 2 weeks            Relevant Orders    CBC and differential    Anemia Panel w/Reflex, OB   Other Visit Diagnoses     Prenatal care, first pregnancy, third trimester    -  Primary    Need for Tdap vaccination        Relevant Orders    Tdap Vaccine greater than or equal to 6yo (Completed)

## 2022-12-07 NOTE — PROGRESS NOTES
Patient here for PN visit  She denies any complaints  Good fetal movement  She has her breast pump  She is checking her blood sugars  She has her covid and flu vaccines  Tdap administered in left deltoid; tolerated well  VIS given  No urine specimen provided today

## 2022-12-07 NOTE — PATIENT INSTRUCTIONS
Valuable Online Resource:    [facility] pregnancy essential guide    [email or web address]      On the right side of the screen is a 50 page guide providing valuable information about your entire pregnancy  On the left hand side of the site you will see several other links to great information and resources that [facility] offers     If you click on the tab that says "Pregnancy and Birth Packet" this opens another  guide to labor and delivery information as well as breast feeding information,  care, pediatricians, car seat safety and much more     The [facility] Baby and [address] tab has a virtual tour of the new L&D unit, as well as valuable information about classes that are offered, breast feeding support, support groups and much more  I highly recommend the virtual Breast Feeding class, very informational even if you have breast fed in the past  Check for available dates ! Click around and enjoy all we have to offer!     Please note that all information in regards to locations and visiting hours have not been updated due to 2 Teresa Jimenez delivery location is [address] [place] [address],[address] @ [place] [address], [address] [place] [address] [place]

## 2022-12-07 NOTE — ASSESSMENT & PLAN NOTE
Trista Kramer  is a 27 y o  Jory Rosales @30w1d who presents for routine prenatal visit  28 wk labs - anemia taking iron, RPR neg   TDAP given today  Flu vaccine previously received  Plans to breastfeed  Pump - ordered   Delivery consent signed   Has yellow packet  Reports good fetal movement  Denies LOF, vaginal bleeding, regular uterine contractions, cramping, headaches or visual changes  Reviewed PTL precautions and FKC

## 2022-12-07 NOTE — ASSESSMENT & PLAN NOTE
Bedtime Levimir increased yesterday due to persistently elevated fasting glucose  Encouraged continued communication with DPP  F/u growth and initiation of twice weekly APFS scheduled at 32 weeks  Encouraged to continue following dietary recommendations and regular physical activity     TWG 0  Pregravid BMI 37      Lab Results   Component Value Date    HGBA1C 5 3 11/10/2022

## 2022-12-21 ENCOUNTER — ULTRASOUND (OUTPATIENT)
Dept: PERINATAL CARE | Facility: OTHER | Age: 32
End: 2022-12-21

## 2022-12-21 VITALS
DIASTOLIC BLOOD PRESSURE: 64 MMHG | BODY MASS INDEX: 37.63 KG/M2 | HEART RATE: 104 BPM | SYSTOLIC BLOOD PRESSURE: 112 MMHG | HEIGHT: 63 IN | WEIGHT: 212.4 LBS

## 2022-12-21 DIAGNOSIS — O24.414 INSULIN CONTROLLED GESTATIONAL DIABETES MELLITUS (GDM) IN THIRD TRIMESTER: Primary | ICD-10-CM

## 2022-12-21 DIAGNOSIS — Z3A.32 32 WEEKS GESTATION OF PREGNANCY: ICD-10-CM

## 2022-12-21 DIAGNOSIS — O36.5930 POOR FETAL GROWTH AFFECTING MANAGEMENT OF MOTHER IN THIRD TRIMESTER, SINGLE OR UNSPECIFIED FETUS: ICD-10-CM

## 2022-12-21 DIAGNOSIS — O99.213 OBESITY AFFECTING PREGNANCY IN THIRD TRIMESTER: ICD-10-CM

## 2022-12-21 NOTE — LETTER
NST sleeve cover sheet    Patient name: Yonny Torres  :   MRN: [id number]    ANUP: Estimated Date of Delivery: 23    Obstetrician: _______SLOGA___________________    Reason(s) for testing:  _____________________A2 GDM_____________________      Testing frequency:    _x_ 2x/wk  ___ 1x/wk  ___ Dopplers  ___ BPP?       Last growth scan: __________________________________________

## 2022-12-21 NOTE — PROGRESS NOTES
Non-Stress Testing:    Non-Stress test, equipment, procedure, and expected outcomes reviewed  Reviewed fetal kick counts and when to call OB  Dannie Manifold Verified patient understanding of fetal kick counts with teach back method  Patient reports feeling daily fetal movements  Patient has no questions or concerns

## 2022-12-23 ENCOUNTER — ROUTINE PRENATAL (OUTPATIENT)
Dept: PERINATAL CARE | Facility: CLINIC | Age: 32
End: 2022-12-23

## 2022-12-23 ENCOUNTER — ROUTINE PRENATAL (OUTPATIENT)
Dept: OBGYN CLINIC | Facility: CLINIC | Age: 32
End: 2022-12-23

## 2022-12-23 VITALS
SYSTOLIC BLOOD PRESSURE: 100 MMHG | BODY MASS INDEX: 37.39 KG/M2 | DIASTOLIC BLOOD PRESSURE: 64 MMHG | WEIGHT: 211 LBS | HEIGHT: 63 IN

## 2022-12-23 VITALS
HEIGHT: 63 IN | SYSTOLIC BLOOD PRESSURE: 116 MMHG | DIASTOLIC BLOOD PRESSURE: 64 MMHG | BODY MASS INDEX: 37.85 KG/M2 | HEART RATE: 99 BPM | WEIGHT: 213.6 LBS

## 2022-12-23 DIAGNOSIS — Z3A.32 32 WEEKS GESTATION OF PREGNANCY: ICD-10-CM

## 2022-12-23 DIAGNOSIS — O36.5930 POOR FETAL GROWTH AFFECTING MANAGEMENT OF MOTHER IN THIRD TRIMESTER, SINGLE OR UNSPECIFIED FETUS: ICD-10-CM

## 2022-12-23 DIAGNOSIS — Z3A.32 32 WEEKS GESTATION OF PREGNANCY: Primary | ICD-10-CM

## 2022-12-23 DIAGNOSIS — O24.414 INSULIN CONTROLLED GESTATIONAL DIABETES MELLITUS (GDM) IN THIRD TRIMESTER: ICD-10-CM

## 2022-12-23 DIAGNOSIS — Z34.03 PRENATAL CARE, FIRST PREGNANCY, THIRD TRIMESTER: Primary | ICD-10-CM

## 2022-12-23 LAB
SL AMB  POCT GLUCOSE, UA: NORMAL
SL AMB POCT URINE PROTEIN: NORMAL

## 2022-12-23 NOTE — LETTER
NST sleeve cover sheet    Patient name: Indigo Lazar  :   MRN: [id number]    ANUP: Estimated Date of Delivery: 23    Obstetrician: _______________________________    Reason(s) for testing:  __________________________________________      Testing frequency:    ___ 2x/wk  ___ 1x/wk  ___ Dopplers  ___ BPP?       Last growth scan: __________________________________________

## 2022-12-23 NOTE — PROGRESS NOTES
Pt is here for routine ob visit   No concerns at this time  Urine   No LOF,VB,Contractions  +FM   UTD flu/tdap/covid vaccines   Delivery Consent signed

## 2022-12-23 NOTE — PROGRESS NOTES
27 y o   at [facility], here for routine OB visit  Feeling well overall and without concerns  Good FM  Denies LOF, VB, contractions  Wondering about delivery timing and induction process  Problem   32 Weeks Gestation of Pregnancy    First OB labs - complete; failed 1 hour and FBS on 3 hour-GDM  Gc/chlamydia - neg  28 week labs - anemia, GDM, RPR neg  S/p TDAP, Flu, COVID vaccines  Delivery consent - signed   - reviewed timing of delivery today process of IOL   Reviewed likely recommendation for traditional positining for delivery given GDM and increased risk of shoulder dystocia, but can discuss pending growth ultrasound          Problem List Items Addressed This Visit        Other    32 weeks gestation of pregnancy   Other Visit Diagnoses     Prenatal care, first pregnancy, third trimester    -  Primary    Relevant Orders    POCT urine dip (Completed)

## 2022-12-23 NOTE — PROGRESS NOTES
Repeat Non-Stress Testing:    Patient verbalizes +FM  Pt denies ALL:               Leaking of fluid   Contractions   Vaginal bleeding   Decreased fetal movement    Patient is performing daily kick counts  Patient has no questions or concerns  NST strip reviewed by Dr Cruz via [facility] Text

## 2022-12-23 NOTE — LETTER
NST sleeve cover sheet    Patient name: Nasrin Sweeney  :   MRN: [id number]    ANUP: Estimated Date of Delivery: 23    Obstetrician: Chloé Ennis     Reason(s) for testing: IUGR   __________________________________________      Testing frequency:    __X_ 2x/wk  ___ 1x/wk  _X_ Dopplers  ___ BPP?       Last growth scan: __________________________________________

## 2022-12-27 NOTE — PATIENT INSTRUCTIONS
Thank you for choosing us for your  care today  If you have any questions about your ultrasound or care, please do not hesitate to contact us or your primary obstetrician  Some general instructions for your pregnancy are:    Exercise: Aim for 22 minutes per day (150 minutes per week) of regular exercise  Walking is great! Nutrition: Choose healthy sources of calcium, iron, and protein  Learn about Preeclampsia: preeclampsia is a common, serious high blood pressure complication in pregnancy  A blood pressure of 538PEBV (systolic or top number) or 06BUUU (diastolic or bottom number) is not normal and needs evaluation by your doctor  Aspirin is sometimes prescribed in early pregnancy to prevent preeclampsia in women with risk factors - ask your obstetrician if you should be on this medication  If you smoke, try to reduce how many cigarettes you smoke or try to quit completely  Do not vape  Other warning signs to watch out for in pregnancy or postpartum: chest pain, obstructed breathing or shortness of breath, seizures, thoughts of hurting yourself or your baby, bleeding, a painful or swollen leg, fever, or headache (see [employer] POST-BIRTH Warning Signs campaign)  If these happen call 911  Itching is also not normal in pregnancy and if you experience this, especially over your hands and feet, potentially worse at night, notify your doctors

## 2022-12-28 ENCOUNTER — ULTRASOUND (OUTPATIENT)
Facility: HOSPITAL | Age: 32
End: 2022-12-28

## 2022-12-28 VITALS
BODY MASS INDEX: 37.67 KG/M2 | DIASTOLIC BLOOD PRESSURE: 52 MMHG | SYSTOLIC BLOOD PRESSURE: 100 MMHG | HEIGHT: 63 IN | HEART RATE: 95 BPM | WEIGHT: 212.6 LBS

## 2022-12-28 DIAGNOSIS — O24.414 INSULIN CONTROLLED GESTATIONAL DIABETES MELLITUS (GDM) IN THIRD TRIMESTER: Primary | ICD-10-CM

## 2022-12-28 DIAGNOSIS — O36.5930 POOR FETAL GROWTH AFFECTING MANAGEMENT OF MOTHER IN THIRD TRIMESTER, SINGLE OR UNSPECIFIED FETUS: ICD-10-CM

## 2022-12-28 NOTE — PROGRESS NOTES
Repeat Non-Stress Testing:    Patient verbalizes +FM  Pt denies ALL:               Leaking of fluid   Contractions   Vaginal bleeding   Decreased fetal movement    Patient is performing daily kick counts  Patient has no questions or concerns  NST strip reviewed by Dr Milvia Rivas

## 2022-12-28 NOTE — LETTER
NST sleeve cover sheet    Patient name: Tod Aragon  :   MRN: [id number]    ANUP: Estimated Date of Delivery: 23    Obstetrician: Kendall Sharp    Reason(s) for testing:   GDM, obesity, FGR  __________________________________________      Testing frequency:    _X__ 2x/wk  ___ 1x/wk  _X__ Dopplers  ___ BPP?       Last growth scan: __________________________________________

## 2022-12-29 DIAGNOSIS — J45.20 MILD INTERMITTENT ASTHMA WITHOUT COMPLICATION: ICD-10-CM

## 2022-12-29 RX ORDER — AZELASTINE HYDROCHLORIDE 137 UG/1
SPRAY, METERED NASAL
Qty: 30 ML | Refills: 0 | Status: SHIPPED | OUTPATIENT
Start: 2022-12-29

## 2022-12-29 RX ORDER — ALBUTEROL SULFATE 90 UG/1
AEROSOL, METERED RESPIRATORY (INHALATION)
Qty: 8.5 G | Refills: 0 | Status: SHIPPED | OUTPATIENT
Start: 2022-12-29

## 2022-12-29 RX ORDER — FLUTICASONE PROPIONATE AND SALMETEROL 50; 250 UG/1; UG/1
POWDER RESPIRATORY (INHALATION)
Qty: 60 BLISTER | Refills: 0 | Status: SHIPPED | OUTPATIENT
Start: 2022-12-29

## 2022-12-30 ENCOUNTER — ROUTINE PRENATAL (OUTPATIENT)
Facility: HOSPITAL | Age: 32
End: 2022-12-30

## 2022-12-30 VITALS
DIASTOLIC BLOOD PRESSURE: 60 MMHG | BODY MASS INDEX: 38.02 KG/M2 | SYSTOLIC BLOOD PRESSURE: 98 MMHG | HEIGHT: 63 IN | WEIGHT: 214.6 LBS | HEART RATE: 85 BPM

## 2022-12-30 DIAGNOSIS — Z3A.33 33 WEEKS GESTATION OF PREGNANCY: ICD-10-CM

## 2022-12-30 DIAGNOSIS — O24.414 INSULIN CONTROLLED GESTATIONAL DIABETES MELLITUS (GDM) IN THIRD TRIMESTER: Primary | ICD-10-CM

## 2022-12-30 NOTE — PROGRESS NOTES
Repeat Non-Stress Testing:    Patient verbalizes +FM  Pt denies ALL:               Leaking of fluid   Contractions   Vaginal bleeding   Decreased fetal movement    Patient is performing daily kick counts  Patient has no questions or concerns  NST strip reviewed by Dr Cici Young

## 2022-12-30 NOTE — PROGRESS NOTES
[facility]: Ms Nancy Turner was seen today for NST (found under the pregnancy episode) which I reviewed the RN assessment and agree  Please don't hesitate to contact our office with any concerns or questions    Anna Arnett MD

## 2023-01-03 ENCOUNTER — ROUTINE PRENATAL (OUTPATIENT)
Dept: PERINATAL CARE | Facility: CLINIC | Age: 33
End: 2023-01-03

## 2023-01-03 VITALS
SYSTOLIC BLOOD PRESSURE: 122 MMHG | DIASTOLIC BLOOD PRESSURE: 70 MMHG | WEIGHT: 214 LBS | BODY MASS INDEX: 37.92 KG/M2 | HEIGHT: 63 IN | HEART RATE: 98 BPM

## 2023-01-03 DIAGNOSIS — O24.414 INSULIN CONTROLLED GESTATIONAL DIABETES MELLITUS (GDM) IN THIRD TRIMESTER: ICD-10-CM

## 2023-01-03 DIAGNOSIS — Z3A.34 34 WEEKS GESTATION OF PREGNANCY: Primary | ICD-10-CM

## 2023-01-03 NOTE — PROGRESS NOTES
NST was done today  Pt  Reported active fetal movement at home between visit  Daily fetal kick count reviewed and emphasized  Patient verbalized understanding of all and was receptive      Gayle Jones RN

## 2023-01-04 ENCOUNTER — ROUTINE PRENATAL (OUTPATIENT)
Dept: OBGYN CLINIC | Facility: CLINIC | Age: 33
End: 2023-01-04

## 2023-01-04 VITALS — DIASTOLIC BLOOD PRESSURE: 60 MMHG | BODY MASS INDEX: 38.23 KG/M2 | WEIGHT: 215.8 LBS | SYSTOLIC BLOOD PRESSURE: 100 MMHG

## 2023-01-04 DIAGNOSIS — O36.5930 POOR FETAL GROWTH AFFECTING MANAGEMENT OF MOTHER IN THIRD TRIMESTER, SINGLE OR UNSPECIFIED FETUS: ICD-10-CM

## 2023-01-04 DIAGNOSIS — Z3A.34 34 WEEKS GESTATION OF PREGNANCY: ICD-10-CM

## 2023-01-04 DIAGNOSIS — O24.414 INSULIN CONTROLLED GESTATIONAL DIABETES MELLITUS (GDM) IN THIRD TRIMESTER: ICD-10-CM

## 2023-01-04 DIAGNOSIS — Z34.03 PRENATAL CARE, FIRST PREGNANCY, THIRD TRIMESTER: Primary | ICD-10-CM

## 2023-01-04 LAB
SL AMB  POCT GLUCOSE, UA: NORMAL
SL AMB POCT URINE PROTEIN: NORMAL

## 2023-01-04 NOTE — PROGRESS NOTES
PNV  34w1d    Patient denies any lof, vb or ctx  Patient has +fm  Patient urine is -/-   Flu + Tdap - utd  Patient has no concerns today

## 2023-01-05 ENCOUNTER — ULTRASOUND (OUTPATIENT)
Dept: PERINATAL CARE | Facility: CLINIC | Age: 33
End: 2023-01-05

## 2023-01-05 VITALS
DIASTOLIC BLOOD PRESSURE: 60 MMHG | SYSTOLIC BLOOD PRESSURE: 102 MMHG | HEART RATE: 88 BPM | BODY MASS INDEX: 38.41 KG/M2 | HEIGHT: 63 IN | WEIGHT: 216.8 LBS

## 2023-01-05 DIAGNOSIS — O36.5930 POOR FETAL GROWTH AFFECTING MANAGEMENT OF MOTHER IN THIRD TRIMESTER, SINGLE OR UNSPECIFIED FETUS: ICD-10-CM

## 2023-01-05 DIAGNOSIS — O24.414 INSULIN CONTROLLED GESTATIONAL DIABETES MELLITUS (GDM) IN THIRD TRIMESTER: Primary | ICD-10-CM

## 2023-01-05 DIAGNOSIS — Z3A.34 34 WEEKS GESTATION OF PREGNANCY: ICD-10-CM

## 2023-01-05 DIAGNOSIS — O99.213 OBESITY AFFECTING PREGNANCY IN THIRD TRIMESTER: ICD-10-CM

## 2023-01-05 NOTE — ASSESSMENT & PLAN NOTE
Edgar Gonzalez  is a 27 y o  Rosan Height @34w1d who presents for routine prenatal visit  Reports good fetal movement  Denies LOF, vaginal bleeding, regular uterine contractions, cramping, headaches or visual changes  Reviewed PTL/Labor precautions and FKC

## 2023-01-05 NOTE — ASSESSMENT & PLAN NOTE
Insulin controlled at this time  Complaint with DP and reporting blood sugars    Lab Results   Component Value Date    HGBA1C 5 3 11/10/2022

## 2023-01-05 NOTE — ASSESSMENT & PLAN NOTE
FGR 7% at 32 weeks  MFM recommends weekly dopplers, delivery at 38-39 weeks pending 36 week growth ultrasound  Encouraged FKCs and encouraged pt to keep doppler/RELL/NSTs and f/u growth scans as scheduled with M  Advised to tentatively start considering IOL/delivery dates for 38-39 wks pending follow up growth scan  Will wait to schedule IOL until next growth/fetal position check (baby noted to be breech on last RELL US)

## 2023-01-05 NOTE — PROGRESS NOTES
34 weeks gestation of pregnancy  Princess Maharaj  is a 27 y o   @34w1d who presents for routine prenatal visit  Reports good fetal movement  Denies LOF, vaginal bleeding, regular uterine contractions, cramping, headaches or visual changes  Reviewed PTL/Labor precautions and FKC  Insulin controlled gestational diabetes mellitus (GDM) in third trimester  Insulin controlled at this time  Complaint with DP and reporting blood sugars    Lab Results   Component Value Date    HGBA1C 5 3 11/10/2022       Poor fetal growth affecting management of mother in third trimester  FGR 7% at 32 weeks  MFM recommends weekly dopplers, delivery at 38-39 weeks pending 36 week growth ultrasound  Encouraged FKCs and encouraged pt to keep doppler/RELL/NSTs and f/u growth scans as scheduled with MFM  Advised to tentatively start considering IOL/delivery dates for 38-39 wks pending follow up growth scan  Will wait to schedule IOL until next growth/fetal position check (baby noted to be breech on last RELL US)

## 2023-01-05 NOTE — PROGRESS NOTES
Repeat Non-Stress Testing:    Patient verbalizes +FM  Pt denies ALL:               Leaking of fluid   Contractions   Vaginal bleeding   Decreased fetal movement    Patient is performing daily kick counts  Patient has no questions or concerns

## 2023-01-06 NOTE — PATIENT INSTRUCTIONS
Thank you for choosing us for your  care today  If you have any questions about your ultrasound or care, please do not hesitate to contact us or your primary obstetrician  Some general instructions for your pregnancy are:    Exercise: Aim for 22 minutes per day (150 minutes per week) of regular exercise  Walking is great! Nutrition: Choose healthy sources of calcium, iron, and protein  Learn about Preeclampsia: preeclampsia is a common, serious high blood pressure complication in pregnancy  A blood pressure of 650DMVM (systolic or top number) or 08ZMPP (diastolic or bottom number) is not normal and needs evaluation by your doctor  Aspirin is sometimes prescribed in early pregnancy to prevent preeclampsia in women with risk factors - ask your obstetrician if you should be on this medication  For more resources, visit:  https://mothertobaby org/fact-sheets/low-dose-aspirin/  PlannerBlog com cy  https://www highriskpregnancyinfo org/preeclampsia  If you smoke, try to reduce how many cigarettes you smoke or try to quit completely  Do not vape  Other warning signs to watch out for in pregnancy or postpartum: chest pain, obstructed breathing or shortness of breath, seizures, thoughts of hurting yourself or your baby, bleeding, a painful or swollen leg, fever, or headache (see AWHONN POST-BIRTH Warning Signs campaign)  If these happen call 911  Itching is also not normal in pregnancy and if you experience this, especially over your hands and feet, potentially worse at night, notify your doctors

## 2023-01-06 NOTE — PROGRESS NOTES
[address] [address]: Ms Jefe Arrington was seen today for umbilical artery Doppler study, RELL, and NST  Please don't hesitate to contact our office with any concerns or questions    Amy Rios MD

## 2023-01-10 ENCOUNTER — ULTRASOUND (OUTPATIENT)
Dept: PERINATAL CARE | Facility: CLINIC | Age: 33
End: 2023-01-10

## 2023-01-10 VITALS
DIASTOLIC BLOOD PRESSURE: 58 MMHG | BODY MASS INDEX: 38.15 KG/M2 | HEIGHT: 63 IN | SYSTOLIC BLOOD PRESSURE: 104 MMHG | WEIGHT: 215.3 LBS | HEART RATE: 95 BPM

## 2023-01-10 DIAGNOSIS — Z3A.35 35 WEEKS GESTATION OF PREGNANCY: ICD-10-CM

## 2023-01-10 DIAGNOSIS — O24.414 INSULIN CONTROLLED GESTATIONAL DIABETES MELLITUS (GDM) IN THIRD TRIMESTER: ICD-10-CM

## 2023-01-10 DIAGNOSIS — O36.5930 POOR FETAL GROWTH AFFECTING MANAGEMENT OF MOTHER IN THIRD TRIMESTER, SINGLE OR UNSPECIFIED FETUS: Primary | ICD-10-CM

## 2023-01-10 NOTE — PATIENT INSTRUCTIONS
Kick Counts in Pregnancy   WHAT YOU NEED TO KNOW:   What do I need to know about kick counts? Kick counts measure how much your baby is moving in your womb  A kick from your baby can be felt as a twist, turn, swish, roll, or jab  It is common to feel your baby kicking at 26 to 28 weeks of pregnancy  You may feel your baby kick as early as 20 weeks of pregnancy  You may want to start counting at 28 weeks  Why should I measure kick counts? Your baby's movement may provide information about your baby's health  He or she may move less, or not at all, if there are problems  Your baby may move less if he or she is not getting enough oxygen or nutrition from the placenta  Do not smoke while you are pregnant  Smoking decreases the amount of oxygen that gets to your baby  Talk to your healthcare provider if you need help to quit smoking  Tell your healthcare provider as soon as you feel a change in your baby's movements  When do I measure kick counts? Measure kick counts at the same time every day  Measure kick counts when your baby is awake and most active  Your baby may be most active in the evening  How do I measure kick counts? Check that your baby is awake before you measure kick counts  You can wake up your baby by lightly pushing on your belly, walking, or drinking something cold  Your healthcare provider may tell you different ways to measure kick counts  You may be told to do the following:  Use a chart or clock to keep track of the time you start and finish counting  Sit in a chair or lie on your left side  Place your hands on the largest part of your belly  Count until you reach 10 kicks  Write down how much time it takes to count 10 kicks  It may take 30 minutes to 2 hours to count 10 kicks  It should not take more than 2 hours to count 10 kicks  When should I contact my doctor? You feel a change in the number of kicks or movements of your baby       You feel fewer than 10 kicks within 2 hours  You have questions or concerns about your baby's movements  CARE AGREEMENT:   You have the right to help plan your care  Learn about your health condition and how it may be treated  Discuss treatment options with your healthcare providers to decide what care you want to receive  You always have the right to refuse treatment  The above information is an  only  It is not intended as medical advice for individual conditions or treatments  Talk to your doctor, nurse or pharmacist before following any medical regimen to see if it is safe and effective for you  © Copyright [employer] 2022 Information is for End User's use only and may not be sold, redistributed or otherwise used for commercial purposes   All illustrations and images included in CareNotes® are the copyrighted property of A D A M , Inc  or [address] [place], [place] [address]pe

## 2023-01-12 ENCOUNTER — ROUTINE PRENATAL (OUTPATIENT)
Dept: PERINATAL CARE | Facility: CLINIC | Age: 33
End: 2023-01-12

## 2023-01-12 VITALS
HEIGHT: 63 IN | BODY MASS INDEX: 38.36 KG/M2 | SYSTOLIC BLOOD PRESSURE: 112 MMHG | DIASTOLIC BLOOD PRESSURE: 58 MMHG | WEIGHT: 216.5 LBS | HEART RATE: 91 BPM

## 2023-01-12 DIAGNOSIS — O24.414 INSULIN CONTROLLED GESTATIONAL DIABETES MELLITUS (GDM) IN THIRD TRIMESTER: Primary | ICD-10-CM

## 2023-01-12 DIAGNOSIS — Z3A.35 35 WEEKS GESTATION OF PREGNANCY: ICD-10-CM

## 2023-01-12 DIAGNOSIS — O36.5930 POOR FETAL GROWTH AFFECTING MANAGEMENT OF MOTHER IN THIRD TRIMESTER, SINGLE OR UNSPECIFIED FETUS: ICD-10-CM

## 2023-01-12 NOTE — PROGRESS NOTES
Repeat Non-Stress Testing:    Patient verbalizes +FM  Pt denies ALL:               Leaking of fluid   Contractions   Vaginal bleeding   Decreased fetal movement    Patient is performing daily kick counts  Patient has no questions or concerns     NST strip reviewed by Dr Pro Rosales

## 2023-01-13 NOTE — PATIENT INSTRUCTIONS
Thank you for choosing us for your  care today  If you have any questions about your ultrasound or care, please do not hesitate to contact us or your primary obstetrician  Some general instructions for your pregnancy are:    Exercise: Aim for 22 minutes per day (150 minutes per week) of regular exercise  Walking is great! Nutrition: Choose healthy sources of calcium, iron, and protein  Learn about Preeclampsia: preeclampsia is a common, serious high blood pressure complication in pregnancy  A blood pressure of 151UKFA (systolic or top number) or 30UQVB (diastolic or bottom number) is not normal and needs evaluation by your doctor  Aspirin is sometimes prescribed in early pregnancy to prevent preeclampsia in women with risk factors - ask your obstetrician if you should be on this medication  For more resources, visit:  https://mothertobaby org/fact-sheets/low-dose-aspirin/  PlannerBlog com cy  https://www highriskpregnancyinfo org/preeclampsia  If you smoke, try to reduce how many cigarettes you smoke or try to quit completely  Do not vape  Other warning signs to watch out for in pregnancy or postpartum: chest pain, obstructed breathing or shortness of breath, seizures, thoughts of hurting yourself or your baby, bleeding, a painful or swollen leg, fever, or headache (see AWHONN POST-BIRTH Warning Signs campaign)  If these happen call 911  Itching is also not normal in pregnancy and if you experience this, especially over your hands and feet, potentially worse at night, notify your doctors

## 2023-01-13 NOTE — PROGRESS NOTES
[address] [facility]: Ms Zeenat Ford was seen today for NST (found under the pregnancy episode) which I reviewed the RN assessment and agree  Please don't hesitate to contact our office with any concerns or questions    Omayra Ratliff MD

## 2023-01-16 ENCOUNTER — ULTRASOUND (OUTPATIENT)
Dept: PERINATAL CARE | Facility: CLINIC | Age: 33
End: 2023-01-16

## 2023-01-16 VITALS
HEIGHT: 63 IN | WEIGHT: 217 LBS | BODY MASS INDEX: 38.45 KG/M2 | SYSTOLIC BLOOD PRESSURE: 108 MMHG | DIASTOLIC BLOOD PRESSURE: 60 MMHG | HEART RATE: 86 BPM

## 2023-01-16 DIAGNOSIS — Z3A.35 35 WEEKS GESTATION OF PREGNANCY: ICD-10-CM

## 2023-01-16 DIAGNOSIS — O99.013 ANEMIA AFFECTING PREGNANCY IN THIRD TRIMESTER: ICD-10-CM

## 2023-01-16 DIAGNOSIS — O36.5930 POOR FETAL GROWTH AFFECTING MANAGEMENT OF MOTHER IN THIRD TRIMESTER, SINGLE OR UNSPECIFIED FETUS: ICD-10-CM

## 2023-01-16 DIAGNOSIS — O24.414 INSULIN CONTROLLED GESTATIONAL DIABETES MELLITUS (GDM) IN THIRD TRIMESTER: Primary | ICD-10-CM

## 2023-01-16 DIAGNOSIS — O99.212 OBESITY COMPLICATING PREGNANCY, SECOND TRIMESTER: ICD-10-CM

## 2023-01-16 NOTE — PATIENT INSTRUCTIONS
Kick Counts in Pregnancy   Kick counts  measure how much your baby is moving in your womb  A kick from your baby can be felt as a twist, turn, swish, roll, or jab  It is common to feel your baby kicking at 26 to 28 weeks of pregnancy  You may feel your baby kick as early as 20 weeks of pregnancy  Seek care immediately if:   You feel your baby kick less as the day goes on  You do not feel any kicks in a day  Contact your healthcare provider if:   You feel a change in the number of kicks or movements of your baby  You feel fewer than 10 kicks within 2 hours after counting twice  You have questions or concerns about your baby's movements  Why measure kick counts:  Your baby's movement may provide information about your baby's health  He may move less, or not at all, if there are problems  He may move less if he does not have enough room to grow in your uterus (womb)  He may also move less if he is not getting enough oxygen or nutrition from the placenta  Tell your healthcare provider as soon as you feel a change in your baby's movements  Problems that are found earlier are easier to treat  When to measure kick counts:   Measure kick counts at the same time every day  Measure kick counts when your baby is awake and most active  Your baby may be most active in the evening  Measure kick counts after a meal or snack  Your baby may be more active after you eat  Wait 2 hours after you drink liquids that contain caffeine  Caffeine can make your baby more active than usual   You should not smoke while you are pregnant  Smoking increases the risk of health problems for you and for your baby during your pregnancy  If you do smoke, wait 2 hours to measure kick counts  Nicotine can make your baby more active than usual     How to measure kick counts:  Check that your baby is awake before you measure kick counts  You can wake up your baby by lightly pushing on your belly, walking, or drinking something cold  Your healthcare provider may tell you different ways to measure kick counts  He may tell you to do the following:  Use a chart or clock to keep track of the time you start and finish counting  Sit in a chair or lie on your left side  Place your hands on the largest part of your belly  Count until you reach 10 kicks  Write down how much time it takes to count 10 kicks  It may take 30 minutes to 2 hours to count 10 kicks  It should not take more than 2 hours to count 10 kicks  If you do not feel 10 kicks within 2 hours, wait 1 hour and count again  Your baby can sleep for up to 40 minutes at one time  Kick Counts in Pregnancy   WHAT YOU NEED TO KNOW:   What do I need to know about kick counts? Kick counts measure how much your baby is moving in your womb  A kick from your baby can be felt as a twist, turn, swish, roll, or jab  It is common to feel your baby kicking at 26 to 28 weeks of pregnancy  You may feel your baby kick as early as 20 weeks of pregnancy  You may want to start counting at 28 weeks  Why should I measure kick counts? Your baby's movement may provide information about your baby's health  He or she may move less, or not at all, if there are problems  Your baby may move less if he or she is not getting enough oxygen or nutrition from the placenta  Do not smoke while you are pregnant  Smoking decreases the amount of oxygen that gets to your baby  Talk to your healthcare provider if you need help to quit smoking  Tell your healthcare provider as soon as you feel a change in your baby's movements  When do I measure kick counts? Measure kick counts at the same time every day  Measure kick counts when your baby is awake and most active  Your baby may be most active in the evening  How do I measure kick counts? Check that your baby is awake before you measure kick counts  You can wake up your baby by lightly pushing on your belly, walking, or drinking something cold   Your healthcare provider may tell you different ways to measure kick counts  You may be told to do the following:  Use a chart or clock to keep track of the time you start and finish counting  Sit in a chair or lie on your left side  Place your hands on the largest part of your belly  Count until you reach 10 kicks  Write down how much time it takes to count 10 kicks  It may take 30 minutes to 2 hours to count 10 kicks  It should not take more than 2 hours to count 10 kicks  When should I contact my doctor? You feel a change in the number of kicks or movements of your baby  You feel fewer than 10 kicks within 2 hours  You have questions or concerns about your baby's movements  CARE AGREEMENT:   You have the right to help plan your care  Learn about your health condition and how it may be treated  Discuss treatment options with your healthcare providers to decide what care you want to receive  You always have the right to refuse treatment  The above information is an  only  It is not intended as medical advice for individual conditions or treatments  Talk to your doctor, nurse or pharmacist before following any medical regimen to see if it is safe and effective for you  © Copyright [employer] 2022 Information is for End User's use only and may not be sold, redistributed or otherwise used for commercial purposes   All illustrations and images included in CareNotes® are the copyrighted property of [employer]  or [address] [place], [place] [address]

## 2023-01-16 NOTE — PROGRESS NOTES
NST was done today  Pt  Reported active fetal movement at home between visit  Daily fetal kick count reviewed and emphasized  Patient verbalized understanding of all and was receptive      Levi James RN

## 2023-01-16 NOTE — LETTER
January 16, 2023     [address]    Patient: Arnav Mena   YOB: 1990   Date of Visit: 1/16/2023       Dear Dr Kourtney Bowie: Thank you for referring Arnav Mena to me for evaluation  Below are my notes for this consultation  If you have questions, please do not hesitate to call me  I look forward to following your patient along with you           Sincerely,        Lore Vieira MD        CC: No Recipients

## 2023-01-17 ENCOUNTER — ROUTINE PRENATAL (OUTPATIENT)
Dept: OBGYN CLINIC | Facility: CLINIC | Age: 33
End: 2023-01-17

## 2023-01-17 VITALS — SYSTOLIC BLOOD PRESSURE: 108 MMHG | WEIGHT: 213.4 LBS | DIASTOLIC BLOOD PRESSURE: 64 MMHG | BODY MASS INDEX: 37.8 KG/M2

## 2023-01-17 DIAGNOSIS — O24.414 INSULIN CONTROLLED GESTATIONAL DIABETES MELLITUS (GDM) IN THIRD TRIMESTER: ICD-10-CM

## 2023-01-17 DIAGNOSIS — Z34.03 PRENATAL CARE, FIRST PREGNANCY, THIRD TRIMESTER: ICD-10-CM

## 2023-01-17 DIAGNOSIS — O36.5930 POOR FETAL GROWTH AFFECTING MANAGEMENT OF MOTHER IN THIRD TRIMESTER, SINGLE OR UNSPECIFIED FETUS: Primary | ICD-10-CM

## 2023-01-17 DIAGNOSIS — O99.212 OBESITY COMPLICATING PREGNANCY, SECOND TRIMESTER: ICD-10-CM

## 2023-01-17 DIAGNOSIS — Z3A.36 36 WEEKS GESTATION OF PREGNANCY: ICD-10-CM

## 2023-01-17 LAB
SL AMB  POCT GLUCOSE, UA: ABNORMAL
SL AMB POCT URINE PROTEIN: ABNORMAL

## 2023-01-17 NOTE — ASSESSMENT & PLAN NOTE
Discussed breech presentation  Patient is interested in attempted external cephalic version  We discussed largest risk to be failure of procedure  We also discussed that her fetus may not tolerate a version and/or labor due to growth restriction - which may necessitate a   All questions answered to best of my ability  She is scheduled for ECV followed by IOL or C/S pending success of procedure  Aware if they would like to proceed directly to c/s then just to call the office so we can change scheduling

## 2023-01-17 NOTE — PROGRESS NOTES
Problem List Items Addressed This Visit        Endocrine    Insulin controlled gestational diabetes mellitus (GDM) in third trimester     Due to report blood sugars  Remains on [facility]  Lab Results   Component Value Date    HGBA1C 5 3 11/10/2022               Other    36 weeks gestation of pregnancy     GBS collected  Baby is active  Denies bleeding/LOF/contractions  Obesity complicating pregnancy, second trimester     TWG 3#         Poor fetal growth affecting management of mother in third trimester - Primary     Based on most recent growth scan recommendation is for 37wk delivery  Discussed this with patient and partner who agree with plan of care  Delivery scheduled, see problem for "breech"         Breech presentation     Discussed breech presentation  Patient is interested in attempted external cephalic version  We discussed largest risk to be failure of procedure  We also discussed that her fetus may not tolerate a version and/or labor due to growth restriction - which may necessitate a   All questions answered to best of my ability  She is scheduled for ECV followed by IOL or C/S pending success of procedure  Aware if they would like to proceed directly to c/s then just to call the office so we can change scheduling           Other Visit Diagnoses     Prenatal care, first pregnancy, third trimester        Relevant Orders    POCT urine dip (Completed)    Strep B DNA probe, amplification

## 2023-01-17 NOTE — ASSESSMENT & PLAN NOTE
Due to report blood sugars  Remains on [facility]       Lab Results   Component Value Date    HGBA1C 5 3 11/10/2022

## 2023-01-17 NOTE — ASSESSMENT & PLAN NOTE
Based on most recent growth scan recommendation is for 37wk delivery  Discussed this with patient and partner who agree with plan of care     Delivery scheduled, see problem for "breech"

## 2023-01-18 ENCOUNTER — TELEPHONE (OUTPATIENT)
Dept: OBGYN CLINIC | Facility: CLINIC | Age: 33
End: 2023-01-18

## 2023-01-18 NOTE — TELEPHONE ENCOUNTER
She wrote to [facility] Doctor,      I would like to cancel the ECV, and prepare for induction or  depending on the position the baby is when I arrive to the hospital  Would this be possible?     Thanks,   Corin Maharaj  "  "

## 2023-01-19 ENCOUNTER — ROUTINE PRENATAL (OUTPATIENT)
Dept: PERINATAL CARE | Facility: CLINIC | Age: 33
End: 2023-01-19

## 2023-01-19 VITALS
WEIGHT: 214.2 LBS | SYSTOLIC BLOOD PRESSURE: 102 MMHG | HEART RATE: 86 BPM | HEIGHT: 63 IN | BODY MASS INDEX: 37.95 KG/M2 | DIASTOLIC BLOOD PRESSURE: 56 MMHG

## 2023-01-19 DIAGNOSIS — O24.414 INSULIN CONTROLLED GESTATIONAL DIABETES MELLITUS (GDM) IN THIRD TRIMESTER: Primary | ICD-10-CM

## 2023-01-19 DIAGNOSIS — Z3A.36 36 WEEKS GESTATION OF PREGNANCY: ICD-10-CM

## 2023-01-19 DIAGNOSIS — O36.5930 POOR FETAL GROWTH AFFECTING MANAGEMENT OF MOTHER IN THIRD TRIMESTER, SINGLE OR UNSPECIFIED FETUS: ICD-10-CM

## 2023-01-19 LAB — GP B STREP DNA SPEC QL NAA+PROBE: NEGATIVE

## 2023-01-23 ENCOUNTER — ANESTHESIA EVENT (INPATIENT)
Dept: LABOR AND DELIVERY | Facility: HOSPITAL | Age: 33
End: 2023-01-23

## 2023-01-23 PROBLEM — Z3A.37 37 WEEKS GESTATION OF PREGNANCY: Status: ACTIVE | Noted: 2022-08-03

## 2023-01-23 NOTE — ASSESSMENT & PLAN NOTE
Lab Results   Component Value Date    HGBA1C 5 3 11/10/2022     36u of insulin in pregnancy  F/u 4-6w PP for 2hr GTT

## 2023-01-23 NOTE — ASSESSMENT & PLAN NOTE
Admit to L&D  CBC, RPR, Type & Screen  SVE: deferred  FHT: category I  Clinical EFW: 7lbs by [facility] ; Breech confirmed by [facility]US  GBS status: negative  Postpartum contraception plan: declines  Plan: spinal anesthesia and OR for 1LTCS

## 2023-01-23 NOTE — PRE-PROCEDURE INSTRUCTIONS
Phone call to patient for pre-operative instructions prior to     Pt was instructed to arrive at 0600 2 hours before her scheduled OR time 0800    Pt instructed to remain NPO after midnight  *This includes gum, water and hard candy  *If patient takes AM meds (e g hypertensive meds) they may take these meds with a sip of water  *This should not include medications like prenatal vitamins Aspirin or colace  *Type 1 diabetics should stay on their normal insulin regime or as ordered by their doctor  Type 2 on insulin should take 1/2 dose of their overnight insulin or as instructed by their doctor  Pt should brush their teeth as usual     Pt was instructed to buy and use a pre-surgical wash containing chlorhexidine the night before and the morning of her scheduled  and to wear clean clothing after the wash  Pt instructed not to shave operative area prior to surgery  Pt was asked not to wear any jewelry and to leave all of her valuables at home  Pt was asked to leave all of her larger bags and suitcases in the car to be brought in after she is assigned a post partum room  Pt was informed that she may have 1 support person in the OR/PACU area and of the current visiting policies

## 2023-01-23 NOTE — H&P
H&P Exam - Obstetrics   Daniel Mccarthy 27 y o  female MRN: [id number]  Unit/Bed#: LD PACU-03 Encounter: [id number]      ASSESSMENT:  27 y  o yo  at 37w0d weeks gestation who is being admitted for scheduled 1LTCS for breech presentation  EFW: 6 5lbs by Leopold's  Breech by transabdominal ultrasound    PLAN:    Breech presentation  Assessment & Plan  Breech on TAUS    Poor fetal growth affecting management of mother in third trimester  Assessment & Plan  IUGR with EFW <3%ile    Anemia affecting pregnancy in third trimester  Assessment & Plan  Hgb 11 0  Taking PO iron  Admission Hgb 52 5    Obesity complicating pregnancy, second trimester  Assessment & Plan  BMI 37    Insulin controlled gestational diabetes mellitus (GDM) in third trimester  Assessment & Plan    Lab Results   Component Value Date    HGBA1C 5 3 11/10/2022     36u of insulin    Mild intermittent asthma without complication  Assessment & Plan  Asthma  Avoid hemabate    * 37 weeks gestation of pregnancy  Assessment & Plan  Admit to L&D  CBC, RPR, Type & Screen  SVE: deferred  FHT: category I  Clinical EFW: 7lbs by Leopold's ; Breech confirmed by TAUS  GBS status: negative  Postpartum contraception plan: declines  Plan: spinal anesthesia and OR for 1LTCS      Discussed with Dr Harjinder Blackwell      This patient will be an INPATIENT  and I certify the anticipated length of stay is >2 Midnights  History of Present Illness     Chief Complaint: Elective  Section, Primary    HPI:  Kayden Diggs is a 27 y o  [id number] female with an ANUP of 2023, by Last Menstrual Period at 37w0d weeks gestation who is being admitted for scheduled 1LTCS for breech presentation  Pregnancy is complicated by breech presentation, IUGR, A2GDM (36u of insulin, took half last night), BMI 37, anemia and asthma  Contractions: no  Loss of fluid: no  Vaginal bleeding: no  Fetal movement: yes    She is SLOGA patient       PREGNANCY COMPLICATIONS:   1) Pregnancy at 37w0d  2) Breech presentation  3) IUGR  4) A2GDM  5) BMI 37  6) Anemia  7) Asthma    OB History    Para Term  AB Living   1 0 0 0 0 0   SAB IAB Ectopic Multiple Live Births   0 0 0 0 0      # Outcome Date GA Lbr Abdirahman/2nd Weight Sex Delivery Anes PTL Lv   1 Current                Baby complications/comments: EFW, AC, and BPD: <3%ile; HC and Femur <5%ile    Review of Systems   Constitutional: Negative for chills and fever  HENT: Negative for ear pain and sore throat  Eyes: Negative for pain and visual disturbance  Respiratory: Negative for cough and shortness of breath  Cardiovascular: Negative for chest pain and palpitations  Gastrointestinal: Negative for abdominal pain and vomiting  Genitourinary: Negative for dysuria and hematuria  Musculoskeletal: Negative for arthralgias and back pain  Skin: Negative for color change and rash  Neurological: Negative for seizures and syncope  All other systems reviewed and are negative          Historical Information   Past Medical History:   Diagnosis Date   • Allergic    • Asthma    • Fibroadenoma of left breast      Past Surgical History:   Procedure Laterality Date   • CYST REMOVAL N/A     low back   • WISDOM TOOTH EXTRACTION       Social History   Social History     Substance and Sexual Activity   Alcohol Use Not Currently    Comment: rarely     Social History     Substance and Sexual Activity   Drug Use Not Currently    Comment: tami - 1 year ago     Social History     Tobacco Use   Smoking Status Never   Smokeless Tobacco Never     Family History: non-contributory    Meds/Allergies      Medications Prior to Admission   Medication   • Advair Diskus 250-50 MCG/ACT inhaler   • albuterol (PROVENTIL HFA,VENTOLIN HFA) 90 mcg/act inhaler   • Ascorbic Acid (VITAMIN C PO)   • Azelastine HCl 137 MCG/SPRAY SOLN   • budesonide (RINOCORT AQUA) 32 MCG/ACT nasal spray   • ferrous gluconate (FERGON) 324 mg tablet   • Levemir FlexTouch 100 units/mL injection pen   • Polyethylene Glycol 3350 (MIRALAX PO)   • Prenatal MV-Min-Fe Fum-FA-DHA (PRENATAL+DHA PO)   • Blood Glucose Monitoring Suppl (OneTouch Verio Flex System) w/Device KIT   • Insulin Pen Needle (Pen Needles) 31G X 5 MM MISC   • OneTouch Delica Lancets 44R MISC   • OneTouch Verio test strip        Allergies   Allergen Reactions   • Aspirin Swelling     Swollen eyes   • Shellfish Allergy - Food Allergy Hives, Itching and Swelling       OBJECTIVE:    Vitals: Blood pressure 105/58, pulse 104, temperature 98 °F (36 7 °C), temperature source Oral, resp  rate 18, height 5' 3" (1 6 m), weight 97 2 kg (214 lb 3 2 oz), last menstrual period 05/10/2022, SpO2 97 %  Body mass index is 37 94 kg/m²  Physical Exam  Vitals reviewed  Constitutional:       Appearance: Normal appearance  HENT:      Head: Normocephalic and atraumatic  Eyes:      Extraocular Movements: Extraocular movements intact  Cardiovascular:      Rate and Rhythm: Normal rate  Pulses: Normal pulses  Pulmonary:      Effort: Pulmonary effort is normal  No respiratory distress  Abdominal:      Palpations: Abdomen is soft  Tenderness: There is no abdominal tenderness  Comments: Gravid uterus   Musculoskeletal:         General: Normal range of motion  Cervical back: Normal range of motion  Skin:     General: Skin is warm and dry  Neurological:      Mental Status: She is alert  Psychiatric:         Mood and Affect: Mood normal          Behavior: Behavior normal          Cervix:   deferred    Fetal heart rate:   Baseline Rate: 130 bpm  Variability: Moderate 6-25 bpm  Accelerations: 15 x 15 or greater  Decelerations: None  FHR Category: Category I    [place]:   Contraction Frequency (minutes): 0    GBS: negative    Prenatal Labs: I have personally reviewed pertinent reports    , Blood Type:   Lab Results   Component Value Date/Time    ABO Grouping A 07/29/2022 02:00 PM     , D (Rh type):   Lab Results   Component Value Date/Time    Rh Factor Positive 07/29/2022 02:00 PM     , Antibody Screen: No results found for: ANTIBODYSCR , HCT/HGB:   Lab Results   Component Value Date/Time    Hematocrit 33 0 (L) 01/24/2023 06:34 AM    Hemoglobin 10 9 (L) 01/24/2023 06:34 AM      , MCV:   Lab Results   Component Value Date/Time    MCV 89 01/24/2023 06:34 AM      , Platelets:   Lab Results   Component Value Date/Time    Platelets 605 37/59/6357 06:34 AM      , 1 hour Glucola:   Lab Results   Component Value Date/Time    Glucose 155 (H) 07/29/2022 02:00 PM   , 3 hour GTT:   Lab Results   Component Value Date/Time    Glucose, GTT - 3 Hour 85 09/23/2022 02:24 PM   , Varicella: No results found for: VARICELLAIGG    , Rubella:   Lab Results   Component Value Date/Time    Rubella IgG Quant 27 8 07/29/2022 02:00 PM        , VDRL/RPR:   Lab Results   Component Value Date/Time    RPR Non-Reactive 11/10/2022 11:06 AM      , Urine Culture/Screen:   Lab Results   Component Value Date/Time    Urine Culture <10,000 cfu/ml 07/29/2022 01:53 PM       , Urine Drug Screen: No results found for: AMPHETUR, BARBTUR, BDZUR, THCUR, COCAINEUR, METHADONEUR, OPIATEUR, PCPUR, MTHAMUR, ECSTASYUR, TRICYCLICSUR, Hep B:   Lab Results   Component Value Date/Time    Hepatitis B Surface Ag Non-reactive 07/29/2022 02:00 PM     , Hep C: No components found for: HEPCSAG, EXTHEPCSAG   , HIV:   Lab Results   Component Value Date/Time    HIV-1/HIV-2 Ab Non-Reactive 07/29/2022 02:00 PM     , Chlamydia: No results found for: EXTCHLAMYDIA  , Gonorrhea:   Lab Results   Component Value Date/Time    N gonorrhoeae, DNA Probe Negative 08/03/2022 11:10 AM     , Group B Strep:    Lab Results   Component Value Date/Time    Strep Grp B PCR Negative 01/17/2023 10:01 AM          Invasive Devices     Peripheral Intravenous Line  Duration           Peripheral IV 01/24/23 Left;Ventral (anterior) Forearm <1 day              Narendra Goodson MD  OBGYN PGY-2  1/24/2023  7:24 AM

## 2023-01-24 ENCOUNTER — ANESTHESIA (INPATIENT)
Dept: LABOR AND DELIVERY | Facility: HOSPITAL | Age: 33
End: 2023-01-24

## 2023-01-24 ENCOUNTER — TELEPHONE (OUTPATIENT)
Dept: PERINATAL CARE | Facility: CLINIC | Age: 33
End: 2023-01-24

## 2023-01-24 ENCOUNTER — PATIENT MESSAGE (OUTPATIENT)
Dept: PERINATAL CARE | Facility: CLINIC | Age: 33
End: 2023-01-24

## 2023-01-24 ENCOUNTER — HOSPITAL ENCOUNTER (INPATIENT)
Facility: HOSPITAL | Age: 33
LOS: 2 days | Discharge: HOME | End: 2023-01-26
Attending: STUDENT IN AN ORGANIZED HEALTH CARE EDUCATION/TRAINING PROGRAM | Admitting: OBSTETRICS & GYNECOLOGY

## 2023-01-24 DIAGNOSIS — O24.419 GDM, CLASS A2: ICD-10-CM

## 2023-01-24 DIAGNOSIS — Z98.891 STATUS POST PRIMARY LOW TRANSVERSE CESAREAN SECTION: ICD-10-CM

## 2023-01-24 DIAGNOSIS — O36.5930 POOR FETAL GROWTH AFFECTING MANAGEMENT OF MOTHER IN THIRD TRIMESTER, SINGLE OR UNSPECIFIED FETUS: ICD-10-CM

## 2023-01-24 DIAGNOSIS — O36.5990 FETAL GROWTH RESTRICTION ANTEPARTUM: ICD-10-CM

## 2023-01-24 DIAGNOSIS — Z3A.37 37 WEEKS GESTATION OF PREGNANCY: Primary | ICD-10-CM

## 2023-01-24 DIAGNOSIS — Z13.1 DIABETES MELLITUS SCREENING: Primary | ICD-10-CM

## 2023-01-24 LAB
ABO GROUP BLD: NORMAL
AMPHETAMINES SERPL QL SCN: NEGATIVE
BARBITURATES UR QL: NEGATIVE
BASE EXCESS BLDCOV CALC-SCNC: -2.5 MMOL/L (ref 1–9)
BENZODIAZ UR QL: NEGATIVE
BLD GP AB SCN SERPL QL: NEGATIVE
COCAINE UR QL: NEGATIVE
ERYTHROCYTE [DISTWIDTH] IN BLOOD BY AUTOMATED COUNT: 14.2 % (ref 11.6–15.1)
GLUCOSE SERPL-MCNC: 104 MG/DL (ref 65–140)
HCO3 BLDCOV-SCNC: 22.5 MMOL/L (ref 12.2–28.6)
HCT VFR BLD AUTO: 33 % (ref 34.8–46.1)
HGB BLD-MCNC: 10.9 G/DL (ref 11.5–15.4)
HOLD SPECIMEN: NORMAL
MCH RBC QN AUTO: 29.4 PG (ref 26.8–34.3)
MCHC RBC AUTO-ENTMCNC: 33 G/DL (ref 31.4–37.4)
MCV RBC AUTO: 89 FL (ref 82–98)
METHADONE UR QL: NEGATIVE
OPIATES UR QL SCN: NEGATIVE
OXYCODONE+OXYMORPHONE UR QL SCN: NEGATIVE
OXYHGB MFR BLDCOV: 81.9 %
PCO2 BLDCOV: 39.9 MM HG (ref 27–43)
PCP UR QL: NEGATIVE
PH BLDCOV: 7.37 [PH] (ref 7.19–7.49)
PLATELET # BLD AUTO: 313 THOUSANDS/UL (ref 149–390)
PMV BLD AUTO: 8.7 FL (ref 8.9–12.7)
PO2 BLDCOV: 36.4 MM HG (ref 15–45)
RBC # BLD AUTO: 3.71 MILLION/UL (ref 3.81–5.12)
RH BLD: POSITIVE
RPR SER QL: NORMAL
SAO2 % BLDCOV: 18.2 ML/DL
SPECIMEN EXPIRATION DATE: NORMAL
THC UR QL: NEGATIVE
WBC # BLD AUTO: 9.91 THOUSAND/UL (ref 4.31–10.16)

## 2023-01-24 PROCEDURE — 4A1HXCZ MONITORING OF PRODUCTS OF CONCEPTION, CARDIAC RATE, EXTERNAL APPROACH: ICD-10-PCS | Performed by: OBSTETRICS & GYNECOLOGY

## 2023-01-24 RX ORDER — OXYCODONE HYDROCHLORIDE 5 MG/1
5 TABLET ORAL EVERY 4 HOURS PRN
Status: DISCONTINUED | OUTPATIENT
Start: 2023-01-24 | End: 2023-01-26 | Stop reason: HOSPADM

## 2023-01-24 RX ORDER — CALCIUM CARBONATE 200(500)MG
1000 TABLET,CHEWABLE ORAL DAILY PRN
Status: DISCONTINUED | OUTPATIENT
Start: 2023-01-24 | End: 2023-01-26 | Stop reason: HOSPADM

## 2023-01-24 RX ORDER — OXYTOCIN/RINGER'S LACTATE 30/500 ML
62.5 PLASTIC BAG, INJECTION (ML) INTRAVENOUS ONCE
Status: COMPLETED | OUTPATIENT
Start: 2023-01-24 | End: 2023-01-24

## 2023-01-24 RX ORDER — MORPHINE SULFATE 1 MG/ML
INJECTION, SOLUTION EPIDURAL; INTRATHECAL; INTRAVENOUS AS NEEDED
Status: DISCONTINUED | OUTPATIENT
Start: 2023-01-24 | End: 2023-01-24

## 2023-01-24 RX ORDER — SODIUM CHLORIDE 9 MG/ML
125 INJECTION, SOLUTION INTRAVENOUS CONTINUOUS
Status: DISCONTINUED | OUTPATIENT
Start: 2023-01-24 | End: 2023-01-24

## 2023-01-24 RX ORDER — CEFAZOLIN SODIUM 2 G/50ML
2000 SOLUTION INTRAVENOUS ONCE
Status: COMPLETED | OUTPATIENT
Start: 2023-01-24 | End: 2023-01-24

## 2023-01-24 RX ORDER — ACETAMINOPHEN 325 MG/1
650 TABLET ORAL EVERY 6 HOURS SCHEDULED
Status: DISCONTINUED | OUTPATIENT
Start: 2023-01-24 | End: 2023-01-25

## 2023-01-24 RX ORDER — ONDANSETRON 2 MG/ML
4 INJECTION INTRAMUSCULAR; INTRAVENOUS EVERY 8 HOURS PRN
Status: DISCONTINUED | OUTPATIENT
Start: 2023-01-24 | End: 2023-01-24 | Stop reason: ALTCHOICE

## 2023-01-24 RX ORDER — ONDANSETRON 2 MG/ML
4 INJECTION INTRAMUSCULAR; INTRAVENOUS EVERY 6 HOURS PRN
Status: ACTIVE | OUTPATIENT
Start: 2023-01-24 | End: 2023-01-25

## 2023-01-24 RX ORDER — ONDANSETRON 2 MG/ML
4 INJECTION INTRAMUSCULAR; INTRAVENOUS ONCE AS NEEDED
Status: DISCONTINUED | OUTPATIENT
Start: 2023-01-24 | End: 2023-01-26 | Stop reason: HOSPADM

## 2023-01-24 RX ORDER — BUPIVACAINE HYDROCHLORIDE 7.5 MG/ML
INJECTION, SOLUTION INTRASPINAL AS NEEDED
Status: DISCONTINUED | OUTPATIENT
Start: 2023-01-24 | End: 2023-01-24

## 2023-01-24 RX ORDER — METOCLOPRAMIDE HYDROCHLORIDE 5 MG/ML
5 INJECTION INTRAMUSCULAR; INTRAVENOUS EVERY 6 HOURS PRN
Status: ACTIVE | OUTPATIENT
Start: 2023-01-24 | End: 2023-01-25

## 2023-01-24 RX ORDER — SODIUM CHLORIDE, SODIUM LACTATE, POTASSIUM CHLORIDE, CALCIUM CHLORIDE 600; 310; 30; 20 MG/100ML; MG/100ML; MG/100ML; MG/100ML
125 INJECTION, SOLUTION INTRAVENOUS CONTINUOUS
Status: DISCONTINUED | OUTPATIENT
Start: 2023-01-24 | End: 2023-01-24

## 2023-01-24 RX ORDER — SIMETHICONE 80 MG
80 TABLET,CHEWABLE ORAL 4 TIMES DAILY PRN
Status: DISCONTINUED | OUTPATIENT
Start: 2023-01-24 | End: 2023-01-26 | Stop reason: HOSPADM

## 2023-01-24 RX ORDER — MORPHINE SULFATE 0.5 MG/ML
INJECTION, SOLUTION EPIDURAL; INTRATHECAL; INTRAVENOUS AS NEEDED
Status: DISCONTINUED | OUTPATIENT
Start: 2023-01-24 | End: 2023-01-24

## 2023-01-24 RX ORDER — ONDANSETRON 2 MG/ML
INJECTION INTRAMUSCULAR; INTRAVENOUS AS NEEDED
Status: DISCONTINUED | OUTPATIENT
Start: 2023-01-24 | End: 2023-01-24

## 2023-01-24 RX ORDER — OXYTOCIN/RINGER'S LACTATE 30/500 ML
PLASTIC BAG, INJECTION (ML) INTRAVENOUS CONTINUOUS PRN
Status: DISCONTINUED | OUTPATIENT
Start: 2023-01-24 | End: 2023-01-24

## 2023-01-24 RX ORDER — ENOXAPARIN SODIUM 100 MG/ML
40 INJECTION SUBCUTANEOUS DAILY
Status: DISCONTINUED | OUTPATIENT
Start: 2023-01-25 | End: 2023-01-26 | Stop reason: HOSPADM

## 2023-01-24 RX ORDER — FENTANYL CITRATE/PF 50 MCG/ML
25 SYRINGE (ML) INJECTION
Status: DISCONTINUED | OUTPATIENT
Start: 2023-01-24 | End: 2023-01-26 | Stop reason: HOSPADM

## 2023-01-24 RX ORDER — DOCUSATE SODIUM 100 MG/1
100 CAPSULE, LIQUID FILLED ORAL 2 TIMES DAILY
Status: DISCONTINUED | OUTPATIENT
Start: 2023-01-24 | End: 2023-01-26 | Stop reason: HOSPADM

## 2023-01-24 RX ORDER — NALOXONE HYDROCHLORIDE 0.4 MG/ML
0.1 INJECTION, SOLUTION INTRAMUSCULAR; INTRAVENOUS; SUBCUTANEOUS
Status: ACTIVE | OUTPATIENT
Start: 2023-01-24 | End: 2023-01-25

## 2023-01-24 RX ORDER — DIPHENHYDRAMINE HYDROCHLORIDE 50 MG/ML
25 INJECTION INTRAMUSCULAR; INTRAVENOUS EVERY 6 HOURS PRN
Status: ACTIVE | OUTPATIENT
Start: 2023-01-24 | End: 2023-01-25

## 2023-01-24 RX ORDER — HYDROMORPHONE HCL/PF 1 MG/ML
0.5 SYRINGE (ML) INJECTION EVERY 2 HOUR PRN
Status: DISCONTINUED | OUTPATIENT
Start: 2023-01-24 | End: 2023-01-26 | Stop reason: HOSPADM

## 2023-01-24 RX ORDER — SODIUM CHLORIDE, SODIUM LACTATE, POTASSIUM CHLORIDE, CALCIUM CHLORIDE 600; 310; 30; 20 MG/100ML; MG/100ML; MG/100ML; MG/100ML
125 INJECTION, SOLUTION INTRAVENOUS CONTINUOUS
Status: DISCONTINUED | OUTPATIENT
Start: 2023-01-24 | End: 2023-01-26 | Stop reason: HOSPADM

## 2023-01-24 RX ORDER — SCOLOPAMINE TRANSDERMAL SYSTEM 1 MG/1
1 PATCH, EXTENDED RELEASE TRANSDERMAL
Status: DISCONTINUED | OUTPATIENT
Start: 2023-01-24 | End: 2023-01-26 | Stop reason: HOSPADM

## 2023-01-24 RX ADMIN — SODIUM CHLORIDE, SODIUM LACTATE, POTASSIUM CHLORIDE, AND CALCIUM CHLORIDE 125 ML/HR: .6; .31; .03; .02 INJECTION, SOLUTION INTRAVENOUS at 07:00

## 2023-01-24 RX ADMIN — BUPIVACAINE HYDROCHLORIDE IN DEXTROSE 1.6 ML: 7.5 INJECTION, SOLUTION SUBARACHNOID at 08:06

## 2023-01-24 RX ADMIN — PHENYLEPHRINE HYDROCHLORIDE 50 MCG/MIN: 50 INJECTION INTRAVENOUS at 08:08

## 2023-01-24 RX ADMIN — SODIUM CHLORIDE 1000 ML: 0.9 INJECTION, SOLUTION INTRAVENOUS at 06:30

## 2023-01-24 RX ADMIN — ACETAMINOPHEN 650 MG: 325 TABLET, FILM COATED ORAL at 17:37

## 2023-01-24 RX ADMIN — Medication 62.5 MILLI-UNITS/MIN: at 10:15

## 2023-01-24 RX ADMIN — SODIUM CHLORIDE, SODIUM LACTATE, POTASSIUM CHLORIDE, AND CALCIUM CHLORIDE: .6; .31; .03; .02 INJECTION, SOLUTION INTRAVENOUS at 08:42

## 2023-01-24 RX ADMIN — SODIUM CHLORIDE, SODIUM LACTATE, POTASSIUM CHLORIDE, AND CALCIUM CHLORIDE 125 ML/HR: .6; .31; .03; .02 INJECTION, SOLUTION INTRAVENOUS at 13:02

## 2023-01-24 RX ADMIN — MORPHINE SULFATE 0.2 MG: 1 INJECTION, SOLUTION EPIDURAL; INTRATHECAL; INTRAVENOUS at 08:06

## 2023-01-24 RX ADMIN — MORPHINE SULFATE 4.8 MG: 0.5 INJECTION, SOLUTION EPIDURAL; INTRATHECAL; INTRAVENOUS at 08:48

## 2023-01-24 RX ADMIN — CEFAZOLIN SODIUM 2000 MG: 2 SOLUTION INTRAVENOUS at 08:04

## 2023-01-24 RX ADMIN — ACETAMINOPHEN 650 MG: 325 TABLET, FILM COATED ORAL at 23:58

## 2023-01-24 RX ADMIN — FENTANYL CITRATE 25 MCG: 50 INJECTION INTRAMUSCULAR; INTRAVENOUS at 09:35

## 2023-01-24 RX ADMIN — ONDANSETRON 4 MG: 2 INJECTION INTRAMUSCULAR; INTRAVENOUS at 08:26

## 2023-01-24 RX ADMIN — DOCUSATE SODIUM 100 MG: 100 CAPSULE, LIQUID FILLED ORAL at 17:37

## 2023-01-24 RX ADMIN — SODIUM CHLORIDE, SODIUM LACTATE, POTASSIUM CHLORIDE, AND CALCIUM CHLORIDE 999 ML/HR: .6; .31; .03; .02 INJECTION, SOLUTION INTRAVENOUS at 12:33

## 2023-01-24 RX ADMIN — Medication 250 MILLI-UNITS/MIN: at 08:23

## 2023-01-24 NOTE — OP NOTE
OPERATIVE REPORT  PATIENT NAME: Felix Alcala    :    MRN: [id number]  Pt Location: [facility] L&D OR ROOM 02    SURGERY DATE: 2023    Surgeon(s) and Role:     * Joaquim Lopez MD - Primary     * Effie DORSEY  Dary Rubio MD - Assisting    Preop Diagnosis:  Breech presentation, single or unspecified fetus [O32 1XX0]  IUP at 37w0d  Fetal growth restriction antepartum [O36 5990]  GDM, class A2 [O24 419]    Post-Op Diagnosis Codes:     * Breech presentation, single or unspecified fetus [O32 1XX0]     * Fetal growth restriction antepartum [O36 5990]     * GDM, class A2 [O24 419]      * Viable female     Procedure(s) (LRB):   SECTION () (N/A)    Specimen(s):  ID Type Source Tests Collected by Time Destination   1 :  Tissue (Placenta on Hold) OB Only Placenta TISSUE EXAM OB (PLACENTA) ONLY Joaquim Lopez MD 2023 0825    A :  Cord Blood Cord BLOOD GAS, VENOUS, CORD, BLOOD GAS, ARTERIAL, CORD Joaquim Lopez MD 2023 4578        Surgical QBL:  Surgical QBL (mL): 548 mL      Drains:  Urethral Catheter Non-latex 16 Fr   (Active)   Reasons to continue Urinary Catheter  Post-operative urological requirements 23 0859   Goal for Removal Voiding trial when ambulation improves 23 0859   Site Assessment Clean;Skin intact 23 0859   Collection Container Standard drainage bag 23 0859   Securement Method Securing device (Describe) 23 0859   Number of days: 0       Anesthesia Type:   * No anesthesia type entered *    Operative Indications:  Breech presentation, single or unspecified fetus [O32 1XX0]  Fetal growth restriction antepartum [O36 5990]  GDM, class A2 [O24 419]  IUP at 37w0d     [address]:  No Multiple pregnancy, No Transverse or oblique lie, Breech lie, Nulliparous +  is CHARLIE GROUP 6    Maternal Findings:  Normal uterus  Normal tubes and ovaries bilaterally  No adhesions     Findings:  Viable female weighing 5lbs 1 8oz;  Apgar scores of 8 at one minute and 9 at five minutes  Clear amniotic fluid  Normal placenta with 3-vessel cord inserted centrally    Arterial and Venous Gases:  Umbilical Cord Venous Blood Gas:  Results from last 7 days   Lab Units 23  0824   PH COV  7 369   PCO2 COV mm HG 39 9   HCO3 COV mmol/L 22 5   BASE EXC COV mmol/L -2 5*   O2 CT CD VB mL/dL 18 2   O2 HGB, VENOUS CORD % 90 9     Umbilical Cord Arterial Blood Gas:        Specimens: Arterial and venous cord gases, cord blood, segment of umbilical cord, placenta to pathology    Quantitative Blood Loss: 548 mL    Drains: Brambila catheter           Complications:  None; patient tolerated the procedure well  Disposition: PACU            Condition: stable    Brief Labor Course:   Patient was admitted for primary low transverse  section for FGR and breech presentation  Procedure Details   The patient was seen prior to the procedure  Risks, benefits, possible complications, alternate treatment options, and expected outcomes were discussed with the patient  The patient agreed with the proposed plan and gave informed consent for a 1LTCS  The patient was taken to the [facility] Operating Room where she received spinal anesthesia  For infection prophylaxis, she received 2g Ancef preoperatively  Fetal heart tones in the OR were assessed and noted to be within normal limits (category I) and a Brambila catheter and SCDs were placed  The vagina and abdomen were prepped with Chloraprep, and following appropriate drying time, the patient was draped in the usual sterile manner  A Time Out was held and the above information confirmed  The patient was identified as Gilberto Gambino and the procedure verified as a  Delivery for breech presentation and FGR  A Pfannenstiel incision was made and carried down through the underlying subcutaneous tissue to the fascia using a scalpel   The rectus fascia was then nicked in the midline and dissected bluntly in Samson-Henriquez fashion  The rectus muscles were  and the peritoneum was identified, entered, and extended longitudinally with blunt dissection  The bladder blade was inserted  A low transverse uterine incision was made with the scalpel and extended laterally with blunt dissection  The amnion was entered bluntly  Surgeons hand was inserted through the hysterotomy and the fetal sacrum was palpated, elevated, and delivered through the uterine incision with the assistance of gentle fundal pressure  Fetal body was delivered to the level of the scapula, after which time the legs delivered spontaneously  The left, anterior arm was palpated, elbow flexed, and arm swept down and across the chest for delivery  The  was rotated to the contralateral side  The contralateral arm was palpated and delivered in similar fashion  The fetal zygomatic arches were palpated and fetal head was flexed for delivery  There was noted to be spontaneous cry and good tone  There was no apparent injury to the   The umbilical cord was doubly clamped and cut after 30 seconds to allow for delayed cord clamping  The infant was handed off to the  providers  Arterial and venous cord gases, cord blood, and a segment of umbilical cord were obtained for evaluation  The placenta delivered spontaneously with uterine fundal massage and appeared normal  The uterus was exteriorized and cleaned out with a moist lap sponge  The uterine incision was closed with a running locked suture of 0 Monocryl  A second layer of the same suture was used to imbricate the first   Hemostasis was noted to be excellent  The posterior culdesac was suctioned of clot and debris  The uterus was returned to the abdomen  The paracolic gutters were inspected and cleared of all clots and debris with moist lap sponges  The fascia was closed with a running suture of 0 Vicryl   Subcutaneous adipose tissue was closed with a running suture of 2-0 Plain gut  The skin was closed with a subcuticular running suture of 4-0 Monocryl  Benzoin and steri-strip dressings were applied  The patient appeared to tolerate the procedure very well  Lap sponge, needle, and instrument counts were correct x2  The patient was transferred to her postpartum recovery room in stable condition and her infant went to the  nursery  Attending Attestation: Dr Bhupinder Fernandez MD was present for the entire procedure  Yodit Norris MD  OB/GYN PGY-2  2023  9:20 AM

## 2023-01-24 NOTE — PLAN OF CARE
Problem: PAIN - ADULT  Goal: Verbalizes/displays adequate comfort level or baseline comfort level  Description: Interventions:  - Encourage patient to monitor pain and request assistance  - Assess pain using appropriate pain scale  - Administer analgesics based on type and severity of pain and evaluate response  - Implement non-pharmacological measures as appropriate and evaluate response  - Consider cultural and social influences on pain and pain management  - Notify physician/advanced practitioner if interventions unsuccessful or patient reports new pain  Outcome: Progressing     Problem: INFECTION - ADULT  Goal: Absence or prevention of progression during hospitalization  Description: INTERVENTIONS:  - Assess and monitor for signs and symptoms of infection  - Monitor lab/diagnostic results  - Monitor all insertion sites, i e  indwelling lines, tubes, and drains  - Monitor endotracheal if appropriate and nasal secretions for changes in amount and color  - [place] appropriate cooling/warming therapies per order  - Administer medications as ordered  - Instruct and encourage patient and family to use good hand hygiene technique    Outcome: Progressing  Goal: Absence of fever/infection during neutropenic period  Description: INTERVENTIONS:  - Monitor WBC    Outcome: Progressing     Problem: SAFETY ADULT  Goal: Patient will remain free of falls  Description: INTERVENTIONS:  - Educate patient/family on patient safety including physical limitations  - Instruct patient to call for assistance with activity   - Keep Call bell within reach  - Keep bed low and locked with side rails adjusted as appropriate  - Keep care items and personal belongings within reach  - Initiate and maintain comfort rounds  - Offer Toileting in advance of need    Outcome: Progressing     Problem: DISCHARGE PLANNING  Goal: Discharge to home or other facility with appropriate resources  Description: INTERVENTIONS:  - Identify barriers to discharge w/patient and caregiver  - Arrange for needed discharge resources and transportation as appropriate  - Identify discharge learning needs (meds, wound care, etc )  - Refer to Case Management Department for coordinating discharge planning if the patient needs post-hospital services based on physician/advanced practitioner order or complex needs related to functional status, cognitive ability, or social support system  Outcome: Progressing     Problem: Knowledge Deficit  Goal: Patient/family/caregiver demonstrates understanding of disease process, treatment plan, medications, and discharge instructions  Description: Complete learning assessment and assess knowledge base    Interventions:  - Provide teaching at level of understanding  - Provide teaching via preferred learning methods  Outcome: Progressing     Problem: BIRTH - VAGINAL/ SECTION  Goal: Fetal and maternal status remain reassuring during the birth process  Description: INTERVENTIONS:  - Monitor vital signs  - Monitor fetal heart rate  - Monitor uterine activity  - DVT prophylaxis  - Antibiotic prophylaxis  Outcome: Progressing  Goal: Emotionally satisfying birthing experience for mother/fetus  Description: Interventions:  - Assess, plan, implement and evaluate the nursing care given to the patient in labor  - Advocate the philosophy that each childbirth experience is a unique experience and support the family's chosen level of involvement and control during the labor process   - Actively participate in both the patient's and family's teaching of the birth process  - Consider cultural, Buddhism and age-specific factors and plan care for the patient in labor  Outcome: Progressing

## 2023-01-24 NOTE — ANESTHESIA PREPROCEDURE EVALUATION
Procedure:   SECTION () (Uterus)    Relevant Problems   GYN   (+) 37 weeks gestation of pregnancy      HEMATOLOGY   (+) Anemia affecting pregnancy in third trimester      PULMONARY   (+) Mild intermittent asthma without complication      Endocrine   (+) Insulin controlled gestational diabetes mellitus (GDM) in third trimester      Other   (+) Breech presentation   (+) Obesity complicating pregnancy, second trimester   (+) Poor fetal growth affecting management of mother in third trimester      No Back Issues  Asthma -stable Used advair this am, No recent URI  Chest-Clear  Physical Exam    Airway    Mallampati score: II  TM Distance: >3 FB  Neck ROM: full     Dental   No notable dental hx     Cardiovascular      Pulmonary  Breath sounds clear to auscultation,     Other Findings    1/2 dose 18u Levemir last night  FBS-104 this am    Anesthesia Plan  ASA Score- 2     Anesthesia Type- spinal with ASA Monitors  Additional Monitors:   Airway Plan:           Plan Factors-Exercise tolerance (METS): >4 METS  Chart reviewed  Existing labs reviewed  Patient summary reviewed  Patient is not a current smoker  Induction-     Postoperative Plan-     Informed Consent- Anesthetic plan and risks discussed with patient and spouse  I personally reviewed this patient with the CRNA  Discussed and agreed on the Anesthesia Plan with the CRNA  Salvatore Roach Spinal technique discussed with Patient   Discussed side effects including post dural puncture headache, itching with patient  All questions answered  Consent given        Lab Results   Component Value Date    GLUC 137 11/10/2022    GLUF 89 2022    ALT 17 11/10/2022    AST 10 11/10/2022    BUN 6 11/10/2022    CALCIUM 9 2 11/10/2022     11/10/2022    CO2 22 11/10/2022    CREATININE 0 72 11/10/2022    HCT 33 0 (L) 2023    HGB 10 9 (L) 2023    HGBA1C 5 3 11/10/2022     2023    K 3 5 11/10/2022    WBC 9 91 2023

## 2023-01-24 NOTE — PLAN OF CARE
Problem: PAIN - ADULT  Goal: Verbalizes/displays adequate comfort level or baseline comfort level  Description: Interventions:  - Encourage patient to monitor pain and request assistance  - Assess pain using appropriate pain scale  - Administer analgesics based on type and severity of pain and evaluate response  - Implement non-pharmacological measures as appropriate and evaluate response  - Consider cultural and social influences on pain and pain management  - Notify physician/advanced practitioner if interventions unsuccessful or patient reports new pain  Outcome: Progressing     Problem: INFECTION - ADULT  Goal: Absence or prevention of progression during hospitalization  Description: INTERVENTIONS:  - Assess and monitor for signs and symptoms of infection  - Monitor lab/diagnostic results  - Monitor all insertion sites, i e  indwelling lines, tubes, and drains  - Monitor endotracheal if appropriate and nasal secretions for changes in amount and color  - [place] appropriate cooling/warming therapies per order  - Administer medications as ordered  - Instruct and encourage patient and family to use good hand hygiene technique    Outcome: Progressing  Goal: Absence of fever/infection during neutropenic period  Description: INTERVENTIONS:  - Monitor WBC    Outcome: Progressing     Problem: SAFETY ADULT  Goal: Patient will remain free of falls  Description: INTERVENTIONS:  - Educate patient/family on patient safety including physical limitations  - Instruct patient to call for assistance with activity   - Keep Call bell within reach  - Keep bed low and locked with side rails adjusted as appropriate  - Keep care items and personal belongings within reach  - Initiate and maintain comfort rounds  - Offer Toileting in advance of need    Outcome: Progressing     Problem: DISCHARGE PLANNING  Goal: Discharge to home or other facility with appropriate resources  Description: INTERVENTIONS:  - Identify barriers to discharge w/patient and caregiver  - Arrange for needed discharge resources and transportation as appropriate  - Identify discharge learning needs (meds, wound care, etc )  - Refer to Case Management Department for coordinating discharge planning if the patient needs post-hospital services based on physician/advanced practitioner order or complex needs related to functional status, cognitive ability, or social support system  Outcome: Progressing     Problem: Knowledge Deficit  Goal: Patient/family/caregiver demonstrates understanding of disease process, treatment plan, medications, and discharge instructions  Description: Complete learning assessment and assess knowledge base    Interventions:  - Provide teaching at level of understanding  - Provide teaching via preferred learning methods  Outcome: Progressing     Problem: POSTPARTUM  Goal: Experiences normal postpartum course  Description: INTERVENTIONS:  - Monitor maternal vital signs  - Assess uterine involution and lochia  Outcome: Progressing  Goal: Appropriate maternal -  bonding  Description: INTERVENTIONS:  - Identify family support  - Assess for appropriate maternal/infant bonding   -Encourage maternal/infant bonding opportunities  - Referral to  or  as needed  Outcome: Progressing  Goal: Establishment of infant feeding pattern  Description: INTERVENTIONS:  - Assess breast/bottle feeding  - Refer to lactation as needed  Outcome: Progressing  Goal: Incision(s), wounds(s) or drain site(s) healing without S/S of infection  Description: INTERVENTIONS  - Assess and document dressing, incision, wound bed, drain sites and surrounding tissue  - Provide patient and family education    Outcome: Progressing

## 2023-01-24 NOTE — PLAN OF CARE
Problem: BIRTH - VAGINAL/ SECTION  Goal: Fetal and maternal status remain reassuring during the birth process  Description: INTERVENTIONS:  - Monitor vital signs  - Monitor fetal heart rate  - Monitor uterine activity  - DVT prophylaxis  - Antibiotic prophylaxis  Outcome: Completed  Goal: Emotionally satisfying birthing experience for mother/fetus  Description: Interventions:  - Assess, plan, implement and evaluate the nursing care given to the patient in labor  - Advocate the philosophy that each childbirth experience is a unique experience and support the family's chosen level of involvement and control during the labor process   - Actively participate in both the patient's and family's teaching of the birth process  - Consider cultural, Christianity and age-specific factors and plan care for the patient in labor  Outcome: Completed

## 2023-01-24 NOTE — ANESTHESIA POSTPROCEDURE EVALUATION
Post-Op Assessment Note    CV Status:  Stable  Pain Score: 4 (abdomen)    Pain management: adequate     Mental Status:  Alert and awake   Hydration Status:  Euvolemic   PONV Controlled:  Controlled   Airway Patency:  Patent      Post Op Vitals Reviewed: Yes      Staff: CRNA         No notable events documented      BP   102/50   Temp   97 4   Pulse  85   Resp   18   SpO2   96% RA

## 2023-01-24 NOTE — DISCHARGE SUMMARY
Discharge Summary - Tanya Manzanares 27 y o  female MRN: [id number]    Unit/Bed#: LD OR 2- Encounter: [id number]    Admission Date: 2023     Discharge Date: 23    Admitting Attending: Dr Anabel Patel   Delivering Attending: Dr Anabel Patel   Discharge Attending: Dr Jluis Li    Admission Diagnosis:  Pregnancy at 40 weeks gestation  Breech presentation   IUGR   Insulin controlled GDM  Anemia affecting pregnancy   Obesity complicating pregnancy  Mild intermittent asthma     Discharge Diagnosis:   same as above   Primary LTCS       Procedures: primary low transverse  section     Complications: none apparent     Pt is a 28yo  who was admitted for scheduled primary LTCS for breech presentation at 37w0d  She underwent an uncomplicated  delivery  She delivered a viable female  at 56 on 2023  Weight was 5lbs 1 8oz (2320g) with APGARs of 8 and 9 at 1 and 5 minutes  Her preoperative Hb was 10 9  Her postoperative Hb was 10 3  Her postoperative course was uncomplicated  Condition at discharge: good     On day of discharge pain was well controlled, patient was tolerating PO, passing flatus, a bowel movement  She was discharged with standard post partum/ post operative instructions to follow up with her physician in 1 week for an incision check and in 3-6 weeks for a postpartum appointment  Discharge instructions/Information to patient and family:   -Do not place anything (no partner, tampons or douche) in your vagina for 6 weeks    -You may walk for exercise for the first 6 weeks then gradually return to your usual activities    -Please do not drive for 1 week if you have no stitches and for 2 weeks if you have stitches or underwent a  delivery     -You may take baths or shower per your preference    -Please look at your bust (breasts) in the mirror daily and call for redness or tenderness or increased warmth    -Please call us for temperature > 100 4*F or 38* C, worsening pain or a foul discharge       Discharge Medications:   Prenatal vitamin daily for 6 months or the duration of nursing whichever is longer  Motrin 600 mg orally every 6 hours as needed for pain  Tylenol (over the counter) per bottle directions as needed for pain: do NOT use with percocet  Hydrocortisone cream 1% (over the counter) applied 1-2x daily to hemorrhoids as needed  Percocet as needed    Provisions for Follow-Up Care: Follow up with your doctor in 1 week for incision site check       Planned Readmission: kiran Moore MD  OBGYN PGY-2  01/26/23 6:48 AM

## 2023-01-24 NOTE — ANESTHESIA PROCEDURE NOTES
Spinal Block    Patient location during procedure: OB  Start time: 1/24/2023 8:06 AM  Reason for block: procedure for pain and at surgeon's request  Staffing  Performed: CRNA   Anesthesiologist: Yair Cardozo MD  Resident/CRNA: Cecy Britton CRNA  Preanesthetic Checklist  Completed: patient identified, IV checked, site marked, risks and benefits discussed, surgical consent, monitors and equipment checked, pre-op evaluation and timeout performed  Spinal Block  Patient position: sitting  Prep: ChloraPrep  Patient monitoring: cardiac monitor and frequent blood pressure checks  Approach: midline  Location: L3-4  Injection technique: single-shot  Needle  Needle type: pencil-tip   Needle gauge: 25 G  Needle length: 10 cm  Assessment  Sensory level: T4  Injection Assessment:  negative aspiration for heme, no paresthesia on injection and positive aspiration for clear CSF    Post-procedure:  adhesive bandage applied, pressure dressing applied, secured with tape, site cleaned and sterile dressing applied

## 2023-01-24 NOTE — QUICK NOTE
Notified by RN that patient hypotensive 70s/40s, HR 60s bpm  Patient with baseline BP 90/60s  Patient asymptomatic, small gush of blood with fundal check  IV fluid bolus given, BP now 88/58, HR 68 bpm  Patient asymptomatic, denies dizziness or lightheadedness  Adequate urine output since surgery  Will continue to monitor, consider rechecking CBC if hypotension persists      Katey Reasons, MD  OB/GYN PGY-4  1/24/2023  1:05 PM

## 2023-01-25 LAB
ERYTHROCYTE [DISTWIDTH] IN BLOOD BY AUTOMATED COUNT: 14.2 % (ref 11.6–15.1)
HCT VFR BLD AUTO: 31.8 % (ref 34.8–46.1)
HGB BLD-MCNC: 10.3 G/DL (ref 11.5–15.4)
MCH RBC QN AUTO: 29 PG (ref 26.8–34.3)
MCHC RBC AUTO-ENTMCNC: 32.4 G/DL (ref 31.4–37.4)
MCV RBC AUTO: 90 FL (ref 82–98)
PLATELET # BLD AUTO: 269 THOUSANDS/UL (ref 149–390)
PMV BLD AUTO: 8.9 FL (ref 8.9–12.7)
RBC # BLD AUTO: 3.55 MILLION/UL (ref 3.81–5.12)
WBC # BLD AUTO: 9.91 THOUSAND/UL (ref 4.31–10.16)

## 2023-01-25 RX ORDER — OXYCODONE HYDROCHLORIDE 5 MG/1
5 TABLET ORAL EVERY 4 HOURS PRN
Status: DISCONTINUED | OUTPATIENT
Start: 2023-01-25 | End: 2023-01-26 | Stop reason: HOSPADM

## 2023-01-25 RX ORDER — ACETAMINOPHEN 325 MG/1
650 TABLET ORAL EVERY 6 HOURS SCHEDULED
Status: DISCONTINUED | OUTPATIENT
Start: 2023-01-25 | End: 2023-01-26 | Stop reason: HOSPADM

## 2023-01-25 RX ORDER — OXYCODONE HYDROCHLORIDE 10 MG/1
10 TABLET ORAL EVERY 4 HOURS PRN
Status: DISCONTINUED | OUTPATIENT
Start: 2023-01-25 | End: 2023-01-26 | Stop reason: HOSPADM

## 2023-01-25 RX ORDER — POLYETHYLENE GLYCOL 3350 17 G/17G
17 POWDER, FOR SOLUTION ORAL DAILY PRN
Status: DISCONTINUED | OUTPATIENT
Start: 2023-01-25 | End: 2023-01-26 | Stop reason: HOSPADM

## 2023-01-25 RX ORDER — DIPHENHYDRAMINE HCL 25 MG
25 TABLET ORAL EVERY 6 HOURS PRN
Status: DISCONTINUED | OUTPATIENT
Start: 2023-01-25 | End: 2023-01-26 | Stop reason: HOSPADM

## 2023-01-25 RX ORDER — ONDANSETRON 2 MG/ML
4 INJECTION INTRAMUSCULAR; INTRAVENOUS EVERY 8 HOURS PRN
Status: DISCONTINUED | OUTPATIENT
Start: 2023-01-25 | End: 2023-01-26 | Stop reason: HOSPADM

## 2023-01-25 RX ADMIN — ACETAMINOPHEN 650 MG: 325 TABLET, FILM COATED ORAL at 18:10

## 2023-01-25 RX ADMIN — ENOXAPARIN SODIUM 40 MG: 40 INJECTION SUBCUTANEOUS at 09:48

## 2023-01-25 RX ADMIN — POLYETHYLENE GLYCOL 3350 17 G: 17 POWDER, FOR SOLUTION ORAL at 18:10

## 2023-01-25 RX ADMIN — DOCUSATE SODIUM 100 MG: 100 CAPSULE, LIQUID FILLED ORAL at 18:10

## 2023-01-25 RX ADMIN — ACETAMINOPHEN 650 MG: 325 TABLET, FILM COATED ORAL at 12:21

## 2023-01-25 RX ADMIN — DOCUSATE SODIUM 100 MG: 100 CAPSULE, LIQUID FILLED ORAL at 09:48

## 2023-01-25 RX ADMIN — ACETAMINOPHEN 650 MG: 325 TABLET, FILM COATED ORAL at 05:47

## 2023-01-25 RX ADMIN — ACETAMINOPHEN 650 MG: 325 TABLET, FILM COATED ORAL at 23:35

## 2023-01-25 NOTE — ASSESSMENT & PLAN NOTE
QBL 548cc, Hgb 10 9 --> 10 3  Brambila catheter removed, passed VT  DVT ppx: Lovenox 40mg qD  Continue routine post partum care  Encourage ambulation  Encourage breastfeeding  Contraception: declines  Anticipate discharge POD#2 vs #3

## 2023-01-25 NOTE — PROGRESS NOTES
Progress Note - OB/GYN   Kiran Vargas OrtizFelicie 27 y o  female MRN: [id number]  Unit/Bed#:  318-01 Encounter: [id number]    Assessment:  Post partum Day #1 s/p 1LTCS for breech and IUGR, stable, baby in room    Plan:  Breech presentation  Assessment & Plan  Breech on TAUS    Poor fetal growth affecting management of mother in third trimester  Assessment & Plan  IUGR with EFW <3%ile    Anemia affecting pregnancy in third trimester  Assessment & Plan  Hgb 11 0  Taking PO iron  Admission Hgb 56 5    Obesity complicating pregnancy, second trimester  Assessment & Plan  BMI 37    Insulin controlled gestational diabetes mellitus (GDM) in third trimester  Assessment & Plan    Lab Results   Component Value Date    HGBA1C 5 3 11/10/2022     36u of insulin    Mild intermittent asthma without complication  Assessment & Plan  Asthma  Avoid hemabate    * Status post primary low transverse  section  Assessment & Plan  QBL 548cc, Hgb 10 9 --> 10 3  Brambila catheter removed, passed VT  DVT ppx: Lovenox 40mg qD  Continue routine post partum care  Encourage ambulation  Encourage breastfeeding  Contraception: declines  Anticipate discharge POD#2 vs #3      Subjective/Objective   Chief Complaint:     Post delivery  Patient is doing well  Lochia WNL  Pain well controlled       Subjective:     Pain: yes, cramping, improved with meds  Tolerating PO: yes  Voiding: yes  Flatus: yes  Ambulating: yes  Chest pain: no  Shortness of breath: no  Leg pain: no  Lochia: minimal    Objective:     Vitals: /57 (BP Location: Right arm)   Pulse 74   Temp 98 3 °F (36 8 °C) (Oral)   Resp 18   Ht 5' 3" (1 6 m)   Wt 97 2 kg (214 lb 3 2 oz)   LMP 05/10/2022   SpO2 96%   Breastfeeding Yes   BMI 37 94 kg/m²     I/O        0701   0700  07 07    I V  (mL/kg)  1000 (10 3)    Total Intake(mL/kg)  1000 (10 3)    Urine (mL/kg/hr)  2225 (1)    Blood  548    Total Output  2773    Net  -1773                Lab Results Component Value Date    WBC 9 91 01/24/2023    HGB 10 9 (L) 01/24/2023    HCT 33 0 (L) 01/24/2023    MCV 89 01/24/2023     01/24/2023       Physical Exam:     Gen: AAOx3, NAD  CV: no acute distress  Lungs: no acute distress  Abd: Soft, non-tender, non-distended, no rebound or guarding  Uterine fundus firm and non-tender, 1 cm below the umbilicus   Incision c/d/I  Ext: Non tender    [address]  Dary Rubio MD  OBGYN PGY-2  1/25/2023  5:11 AM

## 2023-01-25 NOTE — LACTATION NOTE
This note was copied from a baby's chart  CONSULT - LACTATION  Baby Girl Lynda Mccarthy 1 days female MRN: [id number]    [address] Room / Bed: (N)/(N) Encounter: [id number]    Maternal Information     MOTHER:  Reba Mccarthy  Maternal Age: 27 y o    OB History: # 1 - Date: 23, Sex: Female, Weight: 2320 g (5 lb 1 8 oz), GA: 37w0d, Delivery: , Low Transverse, Apgar1: 8, Apgar5: 9, Living: Living, Birth Comments: None   Previouse breast reduction surgery? No    Lactation history:   Has patient previously breast fed: No   How long had patient previously breast fed:     Previous breast feeding complications:       Past Surgical History:   Procedure Laterality Date   • CYST REMOVAL N/A     low back   • [place]  DELIVERY ONLY N/A 2023    Procedure:  SECTION (); Surgeon: Margarita Mclean MD;  Location: [facility] ;  Service: Obstetrics   • WISDOM TOOTH EXTRACTION          Birth information:  YOB: 2025   Time of birth: 8:22 AM   Sex: female   Delivery type: , Low Transverse   Birth Weight: 2320 g (5 lb 1 8 oz)   Percent of Weight Change: -4%     Gestational Age: 37w0d   [unfilled]    Assessment     Breast and nipple assessment: symmetrical, round breasts with dark areolas and small, everted nipples    [place] Assessment: tight oral anatomy    Feeding assessment: latch difficulty (due to positioning)  LATCH:  Latch: Repeated attempts, hold nipple in mouth, stimulate to suck   Audible Swallowing: A few with stimulation   Type of Nipple: Everted (After stimulation)   Comfort (Breast/Nipple): Soft/non-tender   Hold (Positioning): Partial assist, teach one side, mother does other, staff holds   [facility] 7          Feeding recommendations:  breast feed on demand  Education on positioning and latch  Education on how to est  Milk supply       Demonstration and teach back of hand expression, alignment and positioning  Teach back of football on the L and R  Some active, coordinated sucks  Education on oral muscle massage  Eduction on jaw compressions to assist with wide mouth  Moved baby into laid back hold  Switch feeds demonstrated with ed  On when to use  Mom has S1    RSB/DC reviewed    Enc   To call lactation    Mom may need small flange - 21mm flange - provided         Kathrin Carl MA 1/25/2023 12:47 PM

## 2023-01-25 NOTE — PLAN OF CARE
Problem: PAIN - ADULT  Goal: Verbalizes/displays adequate comfort level or baseline comfort level  Description: Interventions:  - Encourage patient to monitor pain and request assistance  - Assess pain using appropriate pain scale  - Administer analgesics based on type and severity of pain and evaluate response  - Implement non-pharmacological measures as appropriate and evaluate response  - Consider cultural and social influences on pain and pain management  - Notify physician/advanced practitioner if interventions unsuccessful or patient reports new pain  Outcome: Progressing     Problem: INFECTION - ADULT  Goal: Absence or prevention of progression during hospitalization  Description: INTERVENTIONS:  - Assess and monitor for signs and symptoms of infection  - Monitor lab/diagnostic results  - Monitor all insertion sites, i e  indwelling lines, tubes, and drains  - Monitor endotracheal if appropriate and nasal secretions for changes in amount and color  - [place] appropriate cooling/warming therapies per order  - Administer medications as ordered  - Instruct and encourage patient and family to use good hand hygiene technique    Outcome: Progressing  Goal: Absence of fever/infection during neutropenic period  Description: INTERVENTIONS:  - Monitor WBC    Outcome: Progressing     Problem: SAFETY ADULT  Goal: Patient will remain free of falls  Description: INTERVENTIONS:  - Educate patient/family on patient safety including physical limitations  - Instruct patient to call for assistance with activity   - Keep Call bell within reach  - Keep bed low and locked with side rails adjusted as appropriate  - Keep care items and personal belongings within reach  - Initiate and maintain comfort rounds  - Offer Toileting in advance of need    Outcome: Progressing     Problem: DISCHARGE PLANNING  Goal: Discharge to home or other facility with appropriate resources  Description: INTERVENTIONS:  - Identify barriers to discharge w/patient and caregiver  - Arrange for needed discharge resources and transportation as appropriate  - Identify discharge learning needs (meds, wound care, etc )  - Refer to Case Management Department for coordinating discharge planning if the patient needs post-hospital services based on physician/advanced practitioner order or complex needs related to functional status, cognitive ability, or social support system  Outcome: Progressing     Problem: Knowledge Deficit  Goal: Patient/family/caregiver demonstrates understanding of disease process, treatment plan, medications, and discharge instructions  Description: Complete learning assessment and assess knowledge base    Interventions:  - Provide teaching at level of understanding  - Provide teaching via preferred learning methods  Outcome: Progressing     Problem: POSTPARTUM  Goal: Experiences normal postpartum course  Description: INTERVENTIONS:  - Monitor maternal vital signs  - Assess uterine involution and lochia  Outcome: Progressing  Goal: Appropriate maternal -  bonding  Description: INTERVENTIONS:  - Identify family support  - Assess for appropriate maternal/infant bonding   -Encourage maternal/infant bonding opportunities  - Referral to  or  as needed  Outcome: Prgressing  Goal: Establishment of infant feeding pattern  Description: INTERVENTIONS:  - Assess breast/bottle feeding  - Refer to lactation as needed  Outcome: Progressing  Goal: Incision(s), wounds(s) or drain site(s) healing without S/S of infection  Description: INTERVENTIONS  - Assess and document dressing, incision, wound bed, drain sites and surrounding tissue  - Provide patient and family education    Outcome: Progressing

## 2023-01-25 NOTE — PLAN OF CARE
Problem: PAIN - ADULT  Goal: Verbalizes/displays adequate comfort level or baseline comfort level  Description: Interventions:  - Encourage patient to monitor pain and request assistance  - Assess pain using appropriate pain scale  - Administer analgesics based on type and severity of pain and evaluate response  - Implement non-pharmacological measures as appropriate and evaluate response  - Consider cultural and social influences on pain and pain management  - Notify physician/advanced practitioner if interventions unsuccessful or patient reports new pain  Outcome: Progressing     Problem: INFECTION - ADULT  Goal: Absence or prevention of progression during hospitalization  Description: INTERVENTIONS:  - Assess and monitor for signs and symptoms of infection  - Monitor lab/diagnostic results  - Monitor all insertion sites, i e  indwelling lines, tubes, and drains  - Monitor endotracheal if appropriate and nasal secretions for changes in amount and color  - [place] appropriate cooling/warming therapies per order  - Administer medications as ordered  - Instruct and encourage patient and family to use good hand hygiene technique    Outcome: Progressing  Goal: Absence of fever/infection during neutropenic period  Description: INTERVENTIONS:  - Monitor WBC    Outcome: Progressing     Problem: SAFETY ADULT  Goal: Patient will remain free of falls  Description: INTERVENTIONS:  - Educate patient/family on patient safety including physical limitations  - Instruct patient to call for assistance with activity   - Keep Call bell within reach  - Keep bed low and locked with side rails adjusted as appropriate  - Keep care items and personal belongings within reach  - Initiate and maintain comfort rounds  - Offer Toileting in advance of need    Outcome: Progressing     Problem: DISCHARGE PLANNING  Goal: Discharge to home or other facility with appropriate resources  Description: INTERVENTIONS:  - Identify barriers to discharge w/patient and caregiver  - Arrange for needed discharge resources and transportation as appropriate  - Identify discharge learning needs (meds, wound care, etc )  - Refer to Case Management Department for coordinating discharge planning if the patient needs post-hospital services based on physician/advanced practitioner order or complex needs related to functional status, cognitive ability, or social support system  Outcome: Progressing     Problem: Knowledge Deficit  Goal: Patient/family/caregiver demonstrates understanding of disease process, treatment plan, medications, and discharge instructions  Description: Complete learning assessment and assess knowledge base    Interventions:  - Provide teaching at level of understanding  - Provide teaching via preferred learning methods  Outcome: Progressing     Problem: POSTPARTUM  Goal: Experiences normal postpartum course  Description: INTERVENTIONS:  - Monitor maternal vital signs  - Assess uterine involution and lochia  Outcome: Progressing  Goal: Appropriate maternal -  bonding  Description: INTERVENTIONS:  - Identify family support  - Assess for appropriate maternal/infant bonding   -Encourage maternal/infant bonding opportunities  - Referral to  or  as needed  Outcome: Progressing  Goal: Establishment of infant feeding pattern  Description: INTERVENTIONS:  - Assess breast/bottle feeding  - Refer to lactation as needed  Outcome: Progressing  Goal: Incision(s), wounds(s) or drain site(s) healing without S/S of infection  Description: INTERVENTIONS  - Assess and document dressing, incision, wound bed, drain sites and surrounding tissue  - Provide patient and family education    Outcome: Progressing

## 2023-01-25 NOTE — CASE MANAGEMENT
Case Management Progress Note    Patient name Felix s  Location /-75 MRN [id number]  : 1990 Date 2023       LOS (days): 1  Geometric Mean LOS (GMLOS) (days):   Days to GMLOS:        OBJECTIVE:        Current admission status: Inpatient  Preferred Pharmacy:   [id number] [address]   [facility]  [facility] [address]  Phone: [phone] Fax: [phone]    Primary Care Provider: Janet Ortiz MD    Primary Insurance: Claudia Hernandez  Secondary Insurance:     PROGRESS NOTE:      Consult: Hx THC     Per review of chart, MOB UDS results were negative on admission  Infant UDS results negative  Cord blood sent  MOB report of last use was "una - 1 year ago"  No additional SW concerns noted for discharge

## 2023-01-26 ENCOUNTER — TRANSITIONAL CARE MANAGEMENT (OUTPATIENT)
Dept: INTERNAL MEDICINE CLINIC | Facility: CLINIC | Age: 33
End: 2023-01-26

## 2023-01-26 VITALS
HEART RATE: 73 BPM | HEIGHT: 63 IN | TEMPERATURE: 98 F | RESPIRATION RATE: 18 BRPM | SYSTOLIC BLOOD PRESSURE: 107 MMHG | OXYGEN SATURATION: 98 % | WEIGHT: 214.2 LBS | BODY MASS INDEX: 37.95 KG/M2 | DIASTOLIC BLOOD PRESSURE: 62 MMHG

## 2023-01-26 RX ORDER — DOCUSATE SODIUM 100 MG/1
100 CAPSULE, LIQUID FILLED ORAL 2 TIMES DAILY
Refills: 0
Start: 2023-01-26

## 2023-01-26 RX ORDER — OXYCODONE HYDROCHLORIDE 5 MG/1
5 TABLET ORAL EVERY 4 HOURS PRN
Qty: 3 TABLET | Refills: 0 | Status: SHIPPED | OUTPATIENT
Start: 2023-01-26 | End: 2023-02-05

## 2023-01-26 RX ORDER — IBUPROFEN 800 MG/1
800 TABLET ORAL EVERY 8 HOURS PRN
Qty: 30 TABLET | Refills: 0 | Status: SHIPPED | OUTPATIENT
Start: 2023-01-26

## 2023-01-26 RX ORDER — CALCIUM CARBONATE 200(500)MG
1000 TABLET,CHEWABLE ORAL DAILY PRN
Refills: 0
Start: 2023-01-26

## 2023-01-26 RX ORDER — SIMETHICONE 80 MG
80 TABLET,CHEWABLE ORAL 4 TIMES DAILY PRN
Qty: 30 TABLET | Refills: 0
Start: 2023-01-26

## 2023-01-26 RX ORDER — ACETAMINOPHEN 325 MG/1
650 TABLET ORAL EVERY 6 HOURS SCHEDULED
Qty: 16 TABLET | Refills: 0
Start: 2023-01-26 | End: 2023-01-28

## 2023-01-26 RX ADMIN — ACETAMINOPHEN 650 MG: 325 TABLET, FILM COATED ORAL at 05:23

## 2023-01-26 RX ADMIN — ENOXAPARIN SODIUM 40 MG: 40 INJECTION SUBCUTANEOUS at 09:57

## 2023-01-26 RX ADMIN — ACETAMINOPHEN 650 MG: 325 TABLET, FILM COATED ORAL at 13:15

## 2023-01-26 RX ADMIN — DOCUSATE SODIUM 100 MG: 100 CAPSULE, LIQUID FILLED ORAL at 09:57

## 2023-01-26 RX ADMIN — SIMETHICONE 80 MG: 80 TABLET, CHEWABLE ORAL at 10:08

## 2023-01-26 NOTE — PROGRESS NOTES
Progress Note - OB/GYN   Sarina Mccarthy 27 y o  female MRN: [id number]  Unit/Bed#:  318-01 Encounter: [id number]    Assessment:  Post partum Day #2 s/p 1LTCS for breech presentation, and IUGR stable, baby in room    Plan:  Breech presentation  Assessment & Plan  Breech on TAUS    Poor fetal growth affecting management of mother in third trimester  Assessment & Plan  IUGR with EFW <3%ile    Anemia affecting pregnancy in third trimester  Assessment & Plan  Hgb 11 0  Taking PO iron  Admission Hgb 02 4    Obesity complicating pregnancy, second trimester  Assessment & Plan  BMI 37    Insulin controlled gestational diabetes mellitus (GDM) in third trimester  Assessment & Plan    Lab Results   Component Value Date    HGBA1C 5 3 11/10/2022     36u of insulin in pregnancy  F/u 4-6w PP for 2hr GTT    Mild intermittent asthma without complication  Assessment & Plan  Asthma  Avoid hemabate    * Status post primary low transverse  section  Assessment & Plan  QBL 548cc, Hgb 10 9 --> 10 3  Brambila catheter removed, passed VT  DVT ppx: Lovenox 40mg qD  Continue routine post partum care  Encourage ambulation  Encourage breastfeeding  Contraception: declines  Anticipate discharge POD#2 vs #3      Subjective/Objective   Chief Complaint:     Post delivery  Patient is doing well  Lochia WNL  Pain well controlled       Subjective:     Pain: yes, cramping, improved with meds  Tolerating PO: yes  Voiding: yes  Flatus: yes  Ambulating: yes  Chest pain: no  Shortness of breath: no  Leg pain: no  Lochia: minimal    Objective:     Vitals: /51 (BP Location: Left arm)   Pulse 77   Temp 97 9 °F (36 6 °C) (Oral)   Resp 18   Ht 5' 3" (1 6 m)   Wt 97 2 kg (214 lb 3 2 oz)   LMP 05/10/2022   SpO2 98%   Breastfeeding Yes   BMI 37 94 kg/m²     I/O        07 07 07 07    I V  (mL/kg) 1000 (10 3)     Total Intake(mL/kg) 1000 (10 3)     Urine (mL/kg/hr) 2225 (1)     Blood 548     Total Output 2773     Net -1773                 Lab Results   Component Value Date    WBC 9 91 01/25/2023    HGB 10 3 (L) 01/25/2023    HCT 31 8 (L) 01/25/2023    MCV 90 01/25/2023     01/25/2023       Physical Exam:     Gen: AAOx3, NAD  CV: no acute distress  Lungs: no acute distress  Abd: Soft, non-tender, non-distended, no rebound or guarding  Uterine fundus firm and non-tender, 1 cm below the umbilicus   Incision c/d/I  Ext: Non tender    Jeffrey Escalera MD  OBGYN PGY-2  1/26/2023  6:16 AM

## 2023-01-26 NOTE — PLAN OF CARE
Problem: PAIN - ADULT  Goal: Verbalizes/displays adequate comfort level or baseline comfort level  Description: Interventions:  - Encourage patient to monitor pain and request assistance  - Assess pain using appropriate pain scale  - Administer analgesics based on type and severity of pain and evaluate response  - Implement non-pharmacological measures as appropriate and evaluate response  - Consider cultural and social influences on pain and pain management  - Notify physician/advanced practitioner if interventions unsuccessful or patient reports new pain  Outcome: Progressing     Problem: INFECTION - ADULT  Goal: Absence or prevention of progression during hospitalization  Description: INTERVENTIONS:  - Assess and monitor for signs and symptoms of infection  - Monitor lab/diagnostic results  - Monitor all insertion sites, i e  indwelling lines, tubes, and drains  - Monitor endotracheal if appropriate and nasal secretions for changes in amount and color  - [place] appropriate cooling/warming therapies per order  - Administer medications as ordered  - Instruct and encourage patient and family to use good hand hygiene technique    Outcome: Progressing  Goal: Absence of fever/infection during neutropenic period  Description: INTERVENTIONS:  - Monitor WBC    Outcome: Progressing     Problem: SAFETY ADULT  Goal: Patient will remain free of falls  Description: INTERVENTIONS:  - Educate patient/family on patient safety including physical limitations  - Instruct patient to call for assistance with activity   - Keep Call bell within reach  - Keep bed low and locked with side rails adjusted as appropriate  - Keep care items and personal belongings within reach  - Initiate and maintain comfort rounds  - Offer Toileting in advance of need    Outcome: Progressing     Problem: DISCHARGE PLANNING  Goal: Discharge to home or other facility with appropriate resources  Description: INTERVENTIONS:  - Identify barriers to discharge w/patient and caregiver  - Arrange for needed discharge resources and transportation as appropriate  - Identify discharge learning needs (meds, wound care, etc )  - Refer to Case Management Department for coordinating discharge planning if the patient needs post-hospital services based on physician/advanced practitioner order or complex needs related to functional status, cognitive ability, or social support system  Outcome: Progressing     Problem: Knowledge Deficit  Goal: Patient/family/caregiver demonstrates understanding of disease process, treatment plan, medications, and discharge instructions  Description: Complete learning assessment and assess knowledge base    Interventions:  - Provide teaching at level of understanding  - Provide teaching via preferred learning methods  Outcome: Progressing     Problem: POSTPARTUM  Goal: Experiences normal postpartum course  Description: INTERVENTIONS:  - Monitor maternal vital signs  - Assess uterine involution and lochia  Outcome: Progressing  Goal: Appropriate maternal -  bonding  Description: INTERVENTIONS:  - Identify family support  - Assess for appropriate maternal/infant bonding   -Encourage maternal/infant bonding opportunities  - Referral to  or  as needed  Outcome: Progressing  Goal: Establishment of infant feeding pattern  Description: INTERVENTIONS:  - Assess breast/bottle feeding  - Refer to lactation as needed  Outcome: Progressing  Goal: Incision(s), wounds(s) or drain site(s) healing without S/S of infection  Description: INTERVENTIONS  - Assess and document dressing, incision, wound bed, drain sites and surrounding tissue  - Provide patient and family education    Outcome: Progressing     Problem: POSTPARTUM  Goal: Experiences normal postpartum course  Description: INTERVENTIONS:  - Monitor maternal vital signs  - Assess uterine involution and lochia  Outcome: Progressing  Goal: Appropriate maternal -  bonding  Description: INTERVENTIONS:  - Identify family support  - Assess for appropriate maternal/infant bonding   -Encourage maternal/infant bonding opportunities  - Referral to  or  as needed  Outcome: Progressing  Goal: Establishment of infant feeding pattern  Description: INTERVENTIONS:  - Assess breast/bottle feeding  - Refer to lactation as needed  Outcome: Progressing  Goal: Incision(s), wounds(s) or drain site(s) healing without S/S of infection  Description: INTERVENTIONS  - Assess and document dressing, incision, wound bed, drain sites and surrounding tissue  - Provide patient and family education    Outcome: Progressing

## 2023-01-26 NOTE — PLAN OF CARE
Problem: PAIN - ADULT  Goal: Verbalizes/displays adequate comfort level or baseline comfort level  Description: Interventions:  - Encourage patient to monitor pain and request assistance  - Assess pain using appropriate pain scale  - Administer analgesics based on type and severity of pain and evaluate response  - Implement non-pharmacological measures as appropriate and evaluate response  - Consider cultural and social influences on pain and pain management  - Notify physician/advanced practitioner if interventions unsuccessful or patient reports new pain  Outcome: Adequate for Discharge     Problem: INFECTION - ADULT  Goal: Absence or prevention of progression during hospitalization  Description: INTERVENTIONS:  - Assess and monitor for signs and symptoms of infection  - Monitor lab/diagnostic results  - Monitor all insertion sites, i e  indwelling lines, tubes, and drains  - Monitor endotracheal if appropriate and nasal secretions for changes in amount and color  - [place] appropriate cooling/warming therapies per order  - Administer medications as ordered  - Instruct and encourage patient and family to use good hand hygiene technique    Outcome: Adequate for Discharge  Goal: Absence of fever/infection during neutropenic period  Description: INTERVENTIONS:  - Monitor WBC    Outcome: Adequate for Discharge     Problem: SAFETY ADULT  Goal: Patient will remain free of falls  Description: INTERVENTIONS:  - Educate patient/family on patient safety including physical limitations  - Instruct patient to call for assistance with activity   - Keep Call bell within reach  - Keep bed low and locked with side rails adjusted as appropriate  - Keep care items and personal belongings within reach  - Initiate and maintain comfort rounds  - Offer Toileting in advance of need    Outcome: Adequate for Discharge     Problem: DISCHARGE PLANNING  Goal: Discharge to home or other facility with appropriate resources  Description: INTERVENTIONS:  - Identify barriers to discharge w/patient and caregiver  - Arrange for needed discharge resources and transportation as appropriate  - Identify discharge learning needs (meds, wound care, etc )  - Refer to Case Management Department for coordinating discharge planning if the patient needs post-hospital services based on physician/advanced practitioner order or complex needs related to functional status, cognitive ability, or social support system  Outcome: Adequate for Discharge     Problem: Knowledge Deficit  Goal: Patient/family/caregiver demonstrates understanding of disease process, treatment plan, medications, and discharge instructions  Description: Complete learning assessment and assess knowledge base    Interventions:  - Provide teaching at level of understanding  - Provide teaching via preferred learning methods  Outcome: Adequate for Discharge     Problem: POSTPARTUM  Goal: Experiences normal postpartum course  Description: INTERVENTIONS:  - Monitor maternal vital signs  - Assess uterine involution and lochia  Outcome: Adequate for Discharge  Goal: Appropriate maternal -  bonding  Description: INTERVENTIONS:  - Identify family support  - Assess for appropriate maternal/infant bonding   -Encourage maternal/infant bonding opportunities  - Referral to  or  as needed  Outcome: Adequate for Discharge  Goal: Establishment of infant feeding pattern  Description: INTERVENTIONS:  - Assess breast/bottle feeding  - Refer to lactation as needed  Outcome: Adequate for Discharge  Goal: Incision(s), wounds(s) or drain site(s) healing without S/S of infection  Description: INTERVENTIONS  - Assess and document dressing, incision, wound bed, drain sites and surrounding tissue  - Provide patient and family education    Outcome: Adequate for Discharge

## 2023-01-27 ENCOUNTER — OFFICE VISIT (OUTPATIENT)
Dept: POSTPARTUM | Facility: CLINIC | Age: 33
End: 2023-01-27

## 2023-01-27 DIAGNOSIS — Z71.89 ENCOUNTER FOR BREAST FEEDING COUNSELING: Primary | ICD-10-CM

## 2023-01-27 NOTE — PATIENT INSTRUCTIONS
GOOD RESOURCES FOR EDUCATION:  [facility] - a lot of different types of videos  [employer]/hand expression:  this is a very good skill to learn! [employer]  org : Click on "Exposures" then on pregnancy and breastfeeding  Then click on "baby fact sheets " As a consumer you can do a live chat, email or call  Physicians Guide to Breastfeeding by Dr Brandi Pacheco  You will find information on lymphatic drainage  [employer]  org - videos and handouts     Practice skin to skin as much as you are able  Breastfeeding parent, get yourself comfortable first   Sit back  It helps to put a stool under your feet  Bring baby to you  Your baby should be tummy to tummy with you  Be patient if your baby is putting his/her hands in their mouth - this helps them to get oriented  Gently guide hands so they touch either side of the breast  Babies like to be "belly feeders", it helps them to be posturally stable  Baby's ears, shoulders and hips should be in a straight line but it helps to flex the legs  Try starting with the cross cradle hold  The baby's neck should rest between your thumb and index finger  Your forearm is supporting the baby's body against yours  With the hand that is on the same side as the breast you are latching to, bring your hand all the way under your breast and create compression well behind the aereola  You will know your hand position is correct if your thumb is parallel with the baby's top lip  Compress your breast to allow baby to get the maximum amount breast tissue  Gaylyn Son your nipple to the area between the nose and upper lip  Tickle this area to elicit the "rooting" reflex  Guide the baby's chin so that it touches the underside of the breast first    When the baby opens widely, press the baby on quickly by applying pressure with the heal of your hand between the baby's shoulder blades  If it doesn't feel right, unlatch baby and try again    Once you feel the baby is latched, you can let go of your breast, bring your arm around (that has been compressing the breast) and under the baby  This is a traditional "cradle hold "  If you lean back to about a 45 degree angle, you  can lay the baby on top of you - guide his/her hands to wrap around your breast   The baby will be at an angle and will look like you're wearing a "sash " Baby may need less help in this position  For finger feeding: cut a small hole in the nipple  Thread the tubing up from the bottom of the nipple  Place the purple end in the milk and screw on the nipple  It's easiest if you place the bottle in a pocket of your shirt  Place tubing on your pinky finger and offer it to her - your nail should be against her tongue  The tubing /finger need to go in far enough that the holes at the end are well in her mouth  Please call if you have any questions or concerns  Always try to offer both breasts  There is usually a break in between breasts that can sometimes last 30 minutes  If you lay the the baby down on his/her back after just one breast, they will almost always wake within about 10 minutes  Then you can offer the second breast  If the baby falls asleep after one breast and stays asleep, just offer the other breast at the next feed  Remember, most newborns prefer to be held and particularly like to be held chest to chest with a parent  You cannot spoil your !

## 2023-01-27 NOTE — PROGRESS NOTES
INITIAL BREAST FEEDING EVALUATION    Informant/Relationship: Parents, Eileen Bernardo and Colby    Discussion of General Lactation Issues: Mom tried to feed in the hospital, but was having trouble  She was asked to pump after feeding and to pump every 2 hours, giving baby any expressed milk  Infant is  67 hours old today   History:  Fertility Problem: no  Breast changes: bigger, darker  : no  Full term: 37 weeks   labor:no  First nursing/attempt < 1 hour after birth:about 2 hours after delivery   Skin to skin following delivery: 2 hours  Breast changes after delivery: yes  Rooming in (infant in room with mother with exception of procedures, eg  Circumcision: Yes  Blood sugar issues:no  NICU stay:no  Jaundice:no low risk but is getting tested after this visit  Phototherapy:no  Supplement given: (list supplement and method used as well as reason(s): Just moms milk given with a syringe    No past medical history on file  No current outpatient medications on file  No current facility-administered medications for this visit      No Known Allergies    Social History     Substance and Sexual Activity   Drug Use Not on file       Social History     Interval Breastfeeding History:    Frequency of breast feeding: q 3 hours  Does mother feel breastfeeding is effective: better in the past 12 hours  Does infant appear satisfied after nursing:yes  Stooling pattern normal: Yes  Urinating frequently:Yes  Using shield or shells: n/a    Alternative/Artificial Feedings:   Bottle: n/a  Cup: n/a  Syringe/Finger: Yes           Formula Type: n/a                     Amount: n/a            Breast Milk:                      Amount: 3 ml            Frequency Q 3  Hr between feedings  Elimination Problems: no      Equipment:  Nipple Shield             Type: n/a             Size: n/a             Frequency of Use: n/a  Pump            Type: Spectra S1            Frequency of Use: was told to do every 3 hours but pediatrician wants her to do it every 2 hours  Shells            Type: n/a            Frequency of use: n/a    Equipment Problems: Starting to get uncomfortable  Currently using a 24 and 27 mm but neither one is great  Mom:  Breast: large, pendulous, nipples point down  Nipple Assessment in General: Bilaterally everted, soft, tender on the face of both nipples  Right side is more tender  Mother's Awareness of Feeding Cues                 Recognizes: yes                  Verbalizes: yes  Support System:   History of Breastfeeding: no  Changes/Stressors/Violence: tired, worried about baby's weight and how feeding is going  Concerns/Goals: would like to breast feed    Problems with Mom: Milk is just beginning to transition      Infant:  Behaviors: sleepy, eventually woke to feed  Color: red/yellow  Birth weight: 5 lbs 2 oz  Current weight: 4 lbs 6 oz at the [address]  4 lbs 8 oz at the pediatrician's office right before coming in  Problems with infant: Sleepy  May have jaundice  Finally latched to the left breast first   Her lips flange nicely, wide gape, very consistent jaw movement  Baby stayed latched to the left breast for about 25 minutes, eyes closed, but not asleep  She latched fairly easily to the right breast   Jaw movement looked even better and there was audible swallowing  Infant assessment:   Jossie Assessment for Lingual Frenulum Function    Appearance Items Function Items   Appearance of tongue when lifted  2: Round or square   Lateralization  2: Complete   Elasticity of frenulum  2: Very elastic   Lift of tongue  2: Tip to mid-mouth     Length of lingual frenulum when tongue lifted  lingual frenulum length: 1: 1 cm     Extension of tongue  2:  Tip over lower lip   Attachment of lingual frenulum to tongue  2: Posterior to tip   Spread of anterior tongue  2: Complete   Attachment of lingual frenulum to inferior alveolar ridge  2: Attached to floor of mouth or well below ridge Cupping  2: Entire edge, firm cup   Ankyloglossia Grading:  Class I: mild, 12-16 mm  Class II: moderate, 8-11 mm  Class III: severe, 3-7 mm  ClassIV: complete, less than 3 mm Peristalsis  2: Complete, anterior to posterior       SCORE:    Appearance: 9 (<8=ankyloglossia)  Function: 10 (<11=ankyloglossia) Snapback  2: None          Latch:  Efficiency:               Lips Flanged: Yes              Depth of latch: Good               Audible Swallow: More on the right side              Visible Milk: Yes              Wide Open/ Asymmetrical: Yes              Suck Swallow Cycle: Breathing: good, stable, Coordinated: yes  Nipple Assessment after latch: same as before she started  Latch Problems: Takes a few tries to get her to latch    Position:  Infant's Ergonomics/Body               Body Alignment: good               Head Supported: yes               Close to Mom's body/ Lifted/ Supported: yes               Mom's Ergonomics/Body: yes                           Supported: yes                           Sitting Back: yes                           Brings Baby to her breast: Yes  Positioning Problems: Mom is trying to figure it all out  Demonstrated how to gently compress breast tissue to align the nipple with the nose and to bring chin on to the underside of breast to encourage the widest, deepest latch  Education:  Reviewed Latch: yes  Reviewed Positioning for Dyad: yes, bring baby to breast  Reviewed Frequency/Supply & Demand: Yes, do not try to hold off baby from eating  Feed at least every 3 hours but absolutely more often if she asks  Reviewed Infant:Cues and varied States of Awareness  Reviewed Infant Elimination: yes  Reviewed Alternative/Artificial Feedings: Yes   Discussed possibility of giving some formula if they do not have enough pumped breast milk  Reviewed Mom/Breast care: Encouraged wax paper over coconut oil  Reviewed Equipment: Reviewed settings on the Spectra pump      Plan:  Mom is going to put baby to breast using the positioning techniques that were practiced today  She will then pump both breasts  Whatever milk she is able to get they will feed with a syringe or by finger feeding  Baby is returning to the pediatrician in two days for a weight check    I have spent 90 minutes with family today in which greater than 50% of this time was spent in counseling/coordination of care regarding Patient and family education

## 2023-01-29 DIAGNOSIS — J45.20 MILD INTERMITTENT ASTHMA WITHOUT COMPLICATION: ICD-10-CM

## 2023-01-29 NOTE — PROGRESS NOTES
I have reviewed the notes, assessments, and/or procedures performed by Naomi Murphy, I concur with her/his documentation of Ollie Olivares MD 01/29/23

## 2023-01-30 RX ORDER — AZELASTINE HYDROCHLORIDE 137 UG/1
1 SPRAY, METERED NASAL 2 TIMES DAILY
Qty: 30 ML | Refills: 0 | Status: SHIPPED | OUTPATIENT
Start: 2023-01-30

## 2023-01-30 RX ORDER — ALBUTEROL SULFATE 90 UG/1
2 AEROSOL, METERED RESPIRATORY (INHALATION) EVERY 4 HOURS PRN
Qty: 8.5 G | Refills: 0 | Status: SHIPPED | OUTPATIENT
Start: 2023-01-30

## 2023-01-30 RX ORDER — FLUTICASONE PROPIONATE AND SALMETEROL 250; 50 UG/1; UG/1
1 POWDER RESPIRATORY (INHALATION) DAILY
Qty: 60 BLISTER | Refills: 0 | Status: SHIPPED | OUTPATIENT
Start: 2023-01-30

## 2023-02-13 ENCOUNTER — POSTPARTUM VISIT (OUTPATIENT)
Dept: OBGYN CLINIC | Facility: CLINIC | Age: 33
End: 2023-02-13

## 2023-02-13 VITALS
WEIGHT: 210.4 LBS | HEIGHT: 63 IN | BODY MASS INDEX: 37.28 KG/M2 | SYSTOLIC BLOOD PRESSURE: 94 MMHG | DIASTOLIC BLOOD PRESSURE: 66 MMHG

## 2023-02-13 DIAGNOSIS — Z86.32 HISTORY OF INSULIN CONTROLLED GESTATIONAL DIABETES MELLITUS: ICD-10-CM

## 2023-02-13 PROBLEM — J45.20 MILD INTERMITTENT ASTHMA WITHOUT COMPLICATION: Status: RESOLVED | Noted: 2022-06-27 | Resolved: 2023-02-13

## 2023-02-13 PROBLEM — O99.013 ANEMIA AFFECTING PREGNANCY IN THIRD TRIMESTER: Status: RESOLVED | Noted: 2022-12-07 | Resolved: 2023-02-13

## 2023-02-13 PROBLEM — Z3A.37 37 WEEKS GESTATION OF PREGNANCY: Status: RESOLVED | Noted: 2022-08-03 | Resolved: 2023-02-13

## 2023-02-13 PROBLEM — O24.414 INSULIN CONTROLLED GESTATIONAL DIABETES MELLITUS (GDM) IN THIRD TRIMESTER: Status: RESOLVED | Noted: 2022-08-07 | Resolved: 2023-02-13

## 2023-02-13 PROBLEM — O36.5930 POOR FETAL GROWTH AFFECTING MANAGEMENT OF MOTHER IN THIRD TRIMESTER: Status: RESOLVED | Noted: 2022-12-21 | Resolved: 2023-02-13

## 2023-02-13 PROBLEM — O99.212 OBESITY COMPLICATING PREGNANCY, SECOND TRIMESTER: Status: RESOLVED | Noted: 2022-08-07 | Resolved: 2023-02-13

## 2023-02-13 NOTE — ASSESSMENT & PLAN NOTE
3wks PostPartum  Normal postpartum exam s/p C/S  The patient may advance activity as tolerated and may resume sexual activity at 6 -8wks PP  Contraception discussed  Declined  Planning condom use    She will RTO for routine annual gyn exam in 1-2 mos  The patient agrees with plan

## 2023-02-13 NOTE — PROGRESS NOTES
[address]: Ms Luis Leal was seen today at 20w1d for anatomic survey and cervical length screening ultrasound  See ultrasound report under "OB Procedures" tab  Please don't hesitate to contact our office with any concerns or questions    Katey Zayas MD Please notify patient/family of normal results  - lymph node unchanged, recheck at 21 month well visit

## 2023-02-13 NOTE — PROGRESS NOTES
Assessment/Plan:      1  Postpartum care following  delivery    2  History of insulin controlled gestational diabetes mellitus  Assessment & Plan:  Has order for 2 hour gtt  Advised to complete around 8-10 wks pp            Subjective:   HPI     Patient ID: Becky Kidd is a 27 y o  female  She presents for routine postpartum visit  She is now a  who is 3wks s/p Primary LTCS on  (breech)  Antepartum complications include:  Patient Active Problem List   Diagnosis   • Eczema   • Allergic rhinitis   • Left ovarian cyst   • Status post primary low transverse  section   • History of insulin controlled gestational diabetes mellitus   • Postpartum care following  delivery       Postpartum course was uncomplicated  Since d/c home she has had no complaints  She denies abn bleeding, pelvic pain, breast complaints, bowel/bladder dysfunction, depression/anx  Baby is thriving and is Using breast pump    [place]  Depression Scale Total: 5  Plans for contraception: no method,     Pregnancy: Green  Fetal sex: Female         The following portions of the patient's history were reviewed and updated as appropriate: allergies, current medications, past family history, past medical history, past social history, past surgical history, and problem list     Review of Systems             Objective:  BP 94/66 (BP Location: Right arm, Patient Position: Sitting, Cuff Size: Standard)   Ht 5' 3" (1 6 m)   Wt 95 4 kg (210 lb 6 4 oz)   LMP 05/10/2022   BMI 37 27 kg/m²     Physical Exam  Constitutional:       General: She is not in acute distress  Appearance: Normal appearance  Genitourinary:      Vulva normal       Right Labia: No rash, lesions or skin changes  Left Labia: No lesions, skin changes or rash  No vaginal discharge, erythema or ulceration  Uterus is not enlarged (involuted)  Pelvic exam was performed with patient in the lithotomy position  Cardiovascular:      Rate and Rhythm: Normal rate  Pulmonary:      Effort: Pulmonary effort is normal    Abdominal:      General: There is no distension  Palpations: Abdomen is soft  Musculoskeletal:         General: Normal range of motion  Neurological:      Mental Status: She is alert and oriented to person, place, and time  Skin:     General: Skin is warm and dry     Psychiatric:         Mood and Affect: Mood normal          Behavior: Behavior normal

## 2023-02-20 ENCOUNTER — HOSPITAL ENCOUNTER (INPATIENT)
Facility: HOSPITAL | Age: 33
LOS: 3 days | Discharge: HOME | End: 2023-02-23
Attending: EMERGENCY MEDICINE | Admitting: HOSPITALIST

## 2023-02-20 ENCOUNTER — APPOINTMENT (INPATIENT)
Dept: ULTRASOUND IMAGING | Facility: HOSPITAL | Age: 33
End: 2023-02-20

## 2023-02-20 ENCOUNTER — APPOINTMENT (EMERGENCY)
Dept: RADIOLOGY | Facility: HOSPITAL | Age: 33
End: 2023-02-20

## 2023-02-20 ENCOUNTER — APPOINTMENT (EMERGENCY)
Dept: CT IMAGING | Facility: HOSPITAL | Age: 33
End: 2023-02-20

## 2023-02-20 DIAGNOSIS — K85.10 GALLSTONE PANCREATITIS: ICD-10-CM

## 2023-02-20 DIAGNOSIS — K85.90 PANCREATITIS: Primary | ICD-10-CM

## 2023-02-20 DIAGNOSIS — R19.7 NAUSEA VOMITING AND DIARRHEA: ICD-10-CM

## 2023-02-20 DIAGNOSIS — R00.1 BRADYCARDIA: ICD-10-CM

## 2023-02-20 DIAGNOSIS — R79.89 ELEVATED LFTS: ICD-10-CM

## 2023-02-20 DIAGNOSIS — K80.20 GALLSTONE: ICD-10-CM

## 2023-02-20 DIAGNOSIS — R07.9 CHEST PAIN: ICD-10-CM

## 2023-02-20 DIAGNOSIS — R11.2 NAUSEA VOMITING AND DIARRHEA: ICD-10-CM

## 2023-02-20 PROBLEM — D72.829 LEUKOCYTOSIS: Status: ACTIVE | Noted: 2023-02-20

## 2023-02-20 PROBLEM — R06.02 SOB (SHORTNESS OF BREATH): Status: ACTIVE | Noted: 2023-02-20

## 2023-02-20 LAB
2HR DELTA HS TROPONIN: -1 NG/L
ALBUMIN SERPL BCP-MCNC: 4.3 G/DL (ref 3.5–5)
ALP SERPL-CCNC: 190 U/L (ref 34–104)
ALT SERPL W P-5'-P-CCNC: 222 U/L (ref 7–52)
ANION GAP SERPL CALCULATED.3IONS-SCNC: 8 MMOL/L (ref 4–13)
APTT PPP: 24 SECONDS (ref 23–37)
AST SERPL W P-5'-P-CCNC: 214 U/L (ref 13–39)
BACTERIA UR QL AUTO: NORMAL /HPF
BASOPHILS # BLD AUTO: 0.05 THOUSANDS/ÂΜL (ref 0–0.1)
BASOPHILS NFR BLD AUTO: 0 % (ref 0–1)
BILIRUB SERPL-MCNC: 3.16 MG/DL (ref 0.2–1)
BILIRUB UR QL STRIP: ABNORMAL
BNP SERPL-MCNC: 62 PG/ML (ref 0–100)
BUN SERPL-MCNC: 13 MG/DL (ref 5–25)
CALCIUM SERPL-MCNC: 9.4 MG/DL (ref 8.4–10.2)
CARDIAC TROPONIN I PNL SERPL HS: 2 NG/L
CARDIAC TROPONIN I PNL SERPL HS: 3 NG/L
CHLORIDE SERPL-SCNC: 106 MMOL/L (ref 96–108)
CK SERPL-CCNC: 98 U/L (ref 26–192)
CLARITY UR: CLEAR
CO2 SERPL-SCNC: 25 MMOL/L (ref 21–32)
COLOR UR: YELLOW
CREAT SERPL-MCNC: 0.83 MG/DL (ref 0.6–1.3)
D DIMER PPP FEU-MCNC: 2.98 UG/ML FEU
EOSINOPHIL # BLD AUTO: 0.09 THOUSAND/ÂΜL (ref 0–0.61)
EOSINOPHIL NFR BLD AUTO: 1 % (ref 0–6)
ERYTHROCYTE [DISTWIDTH] IN BLOOD BY AUTOMATED COUNT: 13.8 % (ref 11.6–15.1)
FLUAV RNA RESP QL NAA+PROBE: NEGATIVE
FLUBV RNA RESP QL NAA+PROBE: NEGATIVE
GFR SERPL CREATININE-BSD FRML MDRD: 92 ML/MIN/1.73SQ M
GLUCOSE SERPL-MCNC: 113 MG/DL (ref 65–140)
GLUCOSE UR STRIP-MCNC: NEGATIVE MG/DL
HCT VFR BLD AUTO: 42.5 % (ref 34.8–46.1)
HGB BLD-MCNC: 13.8 G/DL (ref 11.5–15.4)
HGB UR QL STRIP.AUTO: ABNORMAL
IMM GRANULOCYTES # BLD AUTO: 0.07 THOUSAND/UL (ref 0–0.2)
IMM GRANULOCYTES NFR BLD AUTO: 1 % (ref 0–2)
INR PPP: 0.94 (ref 0.84–1.19)
KETONES UR STRIP-MCNC: NEGATIVE MG/DL
LACTATE SERPL-SCNC: 0.7 MMOL/L (ref 0.5–2)
LEUKOCYTE ESTERASE UR QL STRIP: NEGATIVE
LIPASE SERPL-CCNC: 9922 U/L (ref 11–82)
LYMPHOCYTES # BLD AUTO: 1.13 THOUSANDS/ÂΜL (ref 0.6–4.47)
LYMPHOCYTES NFR BLD AUTO: 7 % (ref 14–44)
MCH RBC QN AUTO: 28.6 PG (ref 26.8–34.3)
MCHC RBC AUTO-ENTMCNC: 32.5 G/DL (ref 31.4–37.4)
MCV RBC AUTO: 88 FL (ref 82–98)
MONOCYTES # BLD AUTO: 0.72 THOUSAND/ÂΜL (ref 0.17–1.22)
MONOCYTES NFR BLD AUTO: 5 % (ref 4–12)
NEUTROPHILS # BLD AUTO: 13.3 THOUSANDS/ÂΜL (ref 1.85–7.62)
NEUTS SEG NFR BLD AUTO: 86 % (ref 43–75)
NITRITE UR QL STRIP: NEGATIVE
NON-SQ EPI CELLS URNS QL MICRO: NORMAL /HPF
NRBC BLD AUTO-RTO: 0 /100 WBCS
PH UR STRIP.AUTO: 6 [PH]
PLATELET # BLD AUTO: 435 THOUSANDS/UL (ref 149–390)
PMV BLD AUTO: 8.5 FL (ref 8.9–12.7)
POTASSIUM SERPL-SCNC: 3.9 MMOL/L (ref 3.5–5.3)
PROT SERPL-MCNC: 7.3 G/DL (ref 6.4–8.4)
PROT UR STRIP-MCNC: NEGATIVE MG/DL
PROTHROMBIN TIME: 12.8 SECONDS (ref 11.6–14.5)
RBC # BLD AUTO: 4.82 MILLION/UL (ref 3.81–5.12)
RBC #/AREA URNS AUTO: NORMAL /HPF
RSV RNA RESP QL NAA+PROBE: NEGATIVE
SARS-COV-2 RNA RESP QL NAA+PROBE: NEGATIVE
SODIUM SERPL-SCNC: 139 MMOL/L (ref 135–147)
SP GR UR STRIP.AUTO: 1.03 (ref 1–1.03)
UROBILINOGEN UR STRIP-ACNC: <2 MG/DL
WBC # BLD AUTO: 15.36 THOUSAND/UL (ref 4.31–10.16)
WBC #/AREA URNS AUTO: NORMAL /HPF

## 2023-02-20 RX ORDER — ONDANSETRON 2 MG/ML
4 INJECTION INTRAMUSCULAR; INTRAVENOUS EVERY 6 HOURS PRN
Status: DISCONTINUED | OUTPATIENT
Start: 2023-02-20 | End: 2023-02-23 | Stop reason: HOSPADM

## 2023-02-20 RX ORDER — OXYCODONE HYDROCHLORIDE 5 MG/1
5 TABLET ORAL EVERY 4 HOURS PRN
Status: DISCONTINUED | OUTPATIENT
Start: 2023-02-20 | End: 2023-02-21

## 2023-02-20 RX ORDER — FLUTICASONE PROPIONATE 50 MCG
1 SPRAY, SUSPENSION (ML) NASAL 2 TIMES DAILY
Status: DISCONTINUED | OUTPATIENT
Start: 2023-02-20 | End: 2023-02-23 | Stop reason: HOSPADM

## 2023-02-20 RX ORDER — FLUTICASONE FUROATE AND VILANTEROL 100; 25 UG/1; UG/1
1 POWDER RESPIRATORY (INHALATION) DAILY
Status: DISCONTINUED | OUTPATIENT
Start: 2023-02-21 | End: 2023-02-20 | Stop reason: SDUPTHER

## 2023-02-20 RX ORDER — FLUTICASONE FUROATE AND VILANTEROL 200; 25 UG/1; UG/1
1 POWDER RESPIRATORY (INHALATION) DAILY
Status: DISCONTINUED | OUTPATIENT
Start: 2023-02-21 | End: 2023-02-23 | Stop reason: HOSPADM

## 2023-02-20 RX ORDER — ALBUTEROL SULFATE 90 UG/1
2 AEROSOL, METERED RESPIRATORY (INHALATION) EVERY 4 HOURS PRN
Status: DISCONTINUED | OUTPATIENT
Start: 2023-02-20 | End: 2023-02-23 | Stop reason: HOSPADM

## 2023-02-20 RX ORDER — POLYETHYLENE GLYCOL 3350 17 G/17G
17 POWDER, FOR SOLUTION ORAL DAILY PRN
Status: DISCONTINUED | OUTPATIENT
Start: 2023-02-20 | End: 2023-02-23 | Stop reason: HOSPADM

## 2023-02-20 RX ORDER — ACETAMINOPHEN 325 MG/1
650 TABLET ORAL EVERY 6 HOURS PRN
Status: DISCONTINUED | OUTPATIENT
Start: 2023-02-20 | End: 2023-02-21

## 2023-02-20 RX ORDER — SODIUM CHLORIDE 9 MG/ML
150 INJECTION, SOLUTION INTRAVENOUS CONTINUOUS
Status: DISCONTINUED | OUTPATIENT
Start: 2023-02-20 | End: 2023-02-20

## 2023-02-20 RX ORDER — DOCUSATE SODIUM 100 MG/1
100 CAPSULE, LIQUID FILLED ORAL 2 TIMES DAILY
Status: DISCONTINUED | OUTPATIENT
Start: 2023-02-20 | End: 2023-02-23 | Stop reason: HOSPADM

## 2023-02-20 RX ORDER — HYDROMORPHONE HCL IN WATER/PF 6 MG/30 ML
0.2 PATIENT CONTROLLED ANALGESIA SYRINGE INTRAVENOUS
Status: DISCONTINUED | OUTPATIENT
Start: 2023-02-20 | End: 2023-02-23 | Stop reason: HOSPADM

## 2023-02-20 RX ORDER — AZELASTINE 1 MG/ML
1 SPRAY, METERED NASAL 2 TIMES DAILY
Status: DISCONTINUED | OUTPATIENT
Start: 2023-02-20 | End: 2023-02-23 | Stop reason: HOSPADM

## 2023-02-20 RX ORDER — ONDANSETRON 2 MG/ML
4 INJECTION INTRAMUSCULAR; INTRAVENOUS ONCE
Status: COMPLETED | OUTPATIENT
Start: 2023-02-20 | End: 2023-02-20

## 2023-02-20 RX ORDER — FAMOTIDINE 10 MG/ML
20 INJECTION, SOLUTION INTRAVENOUS ONCE
Status: COMPLETED | OUTPATIENT
Start: 2023-02-20 | End: 2023-02-20

## 2023-02-20 RX ORDER — SODIUM CHLORIDE 9 MG/ML
200 INJECTION, SOLUTION INTRAVENOUS CONTINUOUS
Status: DISCONTINUED | OUTPATIENT
Start: 2023-02-20 | End: 2023-02-21

## 2023-02-20 RX ORDER — OXYCODONE HYDROCHLORIDE 10 MG/1
10 TABLET ORAL EVERY 4 HOURS PRN
Status: DISCONTINUED | OUTPATIENT
Start: 2023-02-20 | End: 2023-02-21

## 2023-02-20 RX ADMIN — AZELASTINE 1 SPRAY: 1 SPRAY, METERED NASAL at 21:32

## 2023-02-20 RX ADMIN — SODIUM CHLORIDE 200 ML/HR: 0.9 INJECTION, SOLUTION INTRAVENOUS at 16:36

## 2023-02-20 RX ADMIN — FLUTICASONE PROPIONATE 1 SPRAY: 50 SPRAY, METERED NASAL at 21:32

## 2023-02-20 RX ADMIN — OXYCODONE HYDROCHLORIDE 10 MG: 10 TABLET ORAL at 06:24

## 2023-02-20 RX ADMIN — DOCUSATE SODIUM 100 MG: 100 CAPSULE, LIQUID FILLED ORAL at 17:12

## 2023-02-20 RX ADMIN — ONDANSETRON 4 MG: 2 INJECTION INTRAMUSCULAR; INTRAVENOUS at 02:16

## 2023-02-20 RX ADMIN — SODIUM CHLORIDE 200 ML/HR: 0.9 INJECTION, SOLUTION INTRAVENOUS at 11:26

## 2023-02-20 RX ADMIN — SODIUM CHLORIDE 150 ML/HR: 0.9 INJECTION, SOLUTION INTRAVENOUS at 04:18

## 2023-02-20 RX ADMIN — IOHEXOL 100 ML: 350 INJECTION, SOLUTION INTRAVENOUS at 03:33

## 2023-02-20 RX ADMIN — SODIUM CHLORIDE 1000 ML: 0.9 INJECTION, SOLUTION INTRAVENOUS at 02:17

## 2023-02-20 RX ADMIN — FAMOTIDINE 20 MG: 10 INJECTION, SOLUTION INTRAVENOUS at 02:17

## 2023-02-20 RX ADMIN — SODIUM CHLORIDE 125 ML/HR: 0.9 INJECTION, SOLUTION INTRAVENOUS at 06:17

## 2023-02-20 NOTE — ASSESSMENT & PLAN NOTE
DDX including but not limited to: chest wall pain, costochondritis, pleurisy, pericarditis, myocarditis, PTX, pneumonia, GI etiology; doubt ACS or MI or dissection or PE or rhabdomyolysis  Troponin- 3  EKG- Sinus arrhythmia, no ST T wave changes  CP resolved     Plan-  Monitor, no further workup necessary

## 2023-02-20 NOTE — ED PROVIDER NOTES
History  Chief Complaint   Patient presents with   • Vomiting     Patient has been vomiting and having diarrhea for 2 days now  She is also having chest pain and SOB  She is concerned because she is s/p  on      Patient is a 27year old female with 2 days of abdominal pain with N/V/D and chest pain and sob  No cough  No fever  No travel  Was last seen at this hospital on 23 for 37 week gestation   No other abdominal surgery  No GI bleeding  No urinary sx  Had gestational diabetes  [facility] website checked on this patient and last Rx filled was on 23 for oxycodone for 1 day supply  History provided by:  Patient and spouse   used: No    Vomiting  Associated symptoms: abdominal pain and diarrhea    Associated symptoms: no cough and no fever        Prior to Admission Medications   Prescriptions Last Dose Informant Patient Reported? Taking? Ascorbic Acid (VITAMIN C PO)   Yes No   Sig: Take by mouth   Patient not taking: Reported on 2023   Azelastine HCl 137 MCG/SPRAY SOLN 2023  No Yes   Si spray into each nostril 2 (two) times a day   Blood Glucose Monitoring Suppl (OneTouch Verio Flex System) w/Device KIT   No No   Sig: Test 4 times daily   Fluticasone-Salmeterol (Advair Diskus) 250-50 mcg/dose inhaler 2023  No Yes   Sig: Inhale 1 puff daily Rinse mouth after use     Polyethylene Glycol 3350 (MIRALAX PO) 2023  Yes Yes   Sig: Take by mouth   Prenatal MV-Min-Fe Fum-FA-DHA (PRENATAL+DHA PO)   Yes No   Sig: Take by mouth   albuterol (PROVENTIL HFA,VENTOLIN HFA) 90 mcg/act inhaler   No No   Sig: Inhale 2 puffs every 4 (four) hours as needed for wheezing   budesonide (RINOCORT AQUA) 32 MCG/ACT nasal spray   Yes No   Si spray into each nostril daily   docusate sodium (COLACE) 100 mg capsule   No No   Sig: Take 1 capsule (100 mg total) by mouth 2 (two) times a day      Facility-Administered Medications: None       Past Medical History: Diagnosis Date   • Allergic    • Asthma    • Fibroadenoma of left breast        Past Surgical History:   Procedure Laterality Date   • CYST REMOVAL N/A     low back   • DC  DELIVERY ONLY N/A 2023    Procedure:  SECTION (); Surgeon: Sy Mcconnell MD;  Location: [facility] ;  Service: Obstetrics   • WISDOM TOOTH EXTRACTION         Family History   Problem Relation Age of Onset   • Hypertension Mother    • Arthritis Mother    • Hypothyroidism Mother    • Hypertension Father    • Hypothyroidism Sister    • Hypothyroidism Sister    • Arthritis Maternal Grandmother    • Cancer Maternal Grandfather    • Stroke Paternal Grandmother    • No Known Problems Paternal Grandfather      I have reviewed and agree with the history as documented  E-Cigarette/Vaping   • E-Cigarette Use Never User      E-Cigarette/Vaping Substances   • Nicotine No    • THC No    • CBD No    • Flavoring No    • Other No    • Unknown No      Social History     Tobacco Use   • Smoking status: Never   • Smokeless tobacco: Never   Vaping Use   • Vaping Use: Never used   Substance Use Topics   • Alcohol use: Not Currently     Comment: rarely   • Drug use: Not Currently     Comment: tami - 1 year ago       Review of Systems   Constitutional: Negative for fever  Respiratory: Positive for shortness of breath  Negative for cough  Cardiovascular: Positive for chest pain  Gastrointestinal: Positive for abdominal pain, diarrhea, nausea and vomiting  Negative for blood in stool  Genitourinary: Negative for difficulty urinating  All other systems reviewed and are negative  Physical Exam  Physical Exam  Vitals and nursing note reviewed  Constitutional:       General: She is in acute distress (moderate)  HENT:      Head: Normocephalic and atraumatic  Mouth/Throat:      Mouth: Mucous membranes are moist    Eyes:      General: No scleral icterus    Cardiovascular:      Rate and Rhythm: Normal rate and regular rhythm  Heart sounds: Normal heart sounds  No murmur heard  Pulmonary:      Effort: Pulmonary effort is normal  No respiratory distress  Breath sounds: Normal breath sounds  No stridor  No wheezing, rhonchi or rales  Chest:      Chest wall: No tenderness  Abdominal:      General: Bowel sounds are normal  There is no distension  Palpations: Abdomen is soft  Tenderness: There is abdominal tenderness (diffuse)  There is no guarding or rebound  Musculoskeletal:         General: No deformity  Cervical back: Normal range of motion and neck supple  Right lower leg: No edema  Left lower leg: No edema  Skin:     General: Skin is warm and dry  Coloration: Skin is not jaundiced  Findings: No erythema or rash  Neurological:      General: No focal deficit present  Mental Status: She is alert and oriented to person, place, and time     Psychiatric:         Mood and Affect: Mood normal          Vital Signs  ED Triage Vitals [02/20/23 0146]   Temperature Pulse Respirations Blood Pressure SpO2   98 5 °F (36 9 °C) 82 20 127/72 97 %      Temp Source Heart Rate Source Patient Position - Orthostatic VS BP Location FiO2 (%)   Oral Monitor Lying Right arm --      Pain Score       --           Vitals:    02/20/23 0146   BP: 127/72   Pulse: 82   Patient Position - Orthostatic VS: Lying         Visual Acuity      ED Medications  Medications   sodium chloride 0 9 % infusion (150 mL/hr Intravenous New Bag 2/20/23 0418)   sodium chloride 0 9 % bolus 1,000 mL (1,000 mL Intravenous New Bag 2/20/23 0217)   ondansetron (ZOFRAN) injection 4 mg (4 mg Intravenous Given 2/20/23 0216)   Famotidine (PF) (PEPCID) injection 20 mg (20 mg Intravenous Given 2/20/23 0217)   iohexol (OMNIPAQUE) 350 MG/ML injection (SINGLE-DOSE) 100 mL (100 mL Intravenous Given 2/20/23 0333)       Diagnostic Studies  Results Reviewed     Procedure Component Value Units Date/Time    Urine Microscopic [[id number]] (Normal) Collected: 02/20/23 0506    Lab Status: Final result Specimen: Urine, Clean Catch Updated: 02/20/23 0519     RBC, UA 1-2 /hpf      WBC, UA 1-2 /hpf      Epithelial Cells Occasional /hpf      Bacteria, UA None Seen /hpf     UA w Reflex to Microscopic w Reflex to Culture [[id number]]  (Abnormal) Collected: 02/20/23 0506    Lab Status: Final result Specimen: Urine, Clean Catch Updated: 02/20/23 0518     Color, UA Yellow     Clarity, UA Clear     Specific Gravity, UA 1 034     pH, UA 6 0     Leukocytes, UA Negative     Nitrite, UA Negative     Protein, UA Negative mg/dl      Glucose, UA Negative mg/dl      Ketones, UA Negative mg/dl      Urobilinogen, UA <2 0 mg/dl      Bilirubin, UA Small     Occult Blood, UA Trace    HS Troponin I 2hr [[id number]] Collected: 02/20/23 0506    Lab Status:  In process Specimen: Blood from Arm, Right Updated: 02/20/23 0511    HS Troponin I 4hr [[id number]]     Lab Status: No result Specimen: Blood     Comprehensive metabolic panel [[id number]]  (Abnormal) Collected: 02/20/23 0217    Lab Status: Final result Specimen: Blood from Arm, Left Updated: 02/20/23 0321     Sodium 139 mmol/L      Potassium 3 9 mmol/L      Chloride 106 mmol/L      CO2 25 mmol/L      ANION GAP 8 mmol/L      BUN 13 mg/dL      Creatinine 0 83 mg/dL      Glucose 113 mg/dL      Calcium 9 4 mg/dL       U/L       U/L      Alkaline Phosphatase 190 U/L      Total Protein 7 3 g/dL      Albumin 4 3 g/dL      Total Bilirubin 3 16 mg/dL      eGFR 92 ml/min/1 73sq m     Narrative:      Eduard guidelines for Chronic Kidney Disease (CKD):   •  Stage 1 with normal or high GFR (GFR > 90 mL/min/1 73 square meters)  •  Stage 2 Mild CKD (GFR = 60-89 mL/min/1 73 square meters)  •  Stage 3A Moderate CKD (GFR = 45-59 mL/min/1 73 square meters)  •  Stage 3B Moderate CKD (GFR = 30-44 mL/min/1 73 square meters)  •  Stage 4 Severe CKD (GFR = 15-29 mL/min/1 73 square meters)  •  Stage 5 End Stage CKD (GFR <15 mL/min/1 73 square meters)  Note: GFR calculation is accurate only with a steady state creatinine    Lipase [[id number]]  (Abnormal) Collected: 02/20/23 0217    Lab Status: Final result Specimen: Blood from Arm, Left Updated: 02/20/23 0321     Lipase 9,922 u/L     CK Total with Reflex CKMB [[id number]]  (Normal) Collected: 02/20/23 0217    Lab Status: Final result Specimen: Blood from Arm, Left Updated: 02/20/23 0321     Total CK 98 U/L     D-Dimer [[id number]]  (Abnormal) Collected: 02/20/23 0217    Lab Status: Final result Specimen: Blood from Arm, Left Updated: 02/20/23 0308     D-Dimer, Quant 2 98 ug/ml FEU     Lactic acid [[id number]]  (Normal) Collected: 02/20/23 0217    Lab Status: Final result Specimen: Blood from Arm, Left Updated: 02/20/23 0306     LACTIC ACID 0 7 mmol/L     Narrative:      Result may be elevated if tourniquet was used during collection      HS Troponin 0hr (reflex protocol) [[id number]]  (Normal) Collected: 02/20/23 0217    Lab Status: Final result Specimen: Blood from Arm, Left Updated: 02/20/23 0301     hs TnI 0hr 3 ng/L     B-Type Natriuretic Peptide(BNP) [[id number]]  (Normal) Collected: 02/20/23 0217    Lab Status: Final result Specimen: Blood from Arm, Left Updated: 02/20/23 0259     BNP 62 pg/mL     Protime-INR [[id number]]  (Normal) Collected: 02/20/23 0217    Lab Status: Final result Specimen: Blood from Arm, Left Updated: 02/20/23 0251     Protime 12 8 seconds      INR 0 94    APTT [[id number]]  (Normal) Collected: 02/20/23 0217    Lab Status: Final result Specimen: Blood from Arm, Left Updated: 02/20/23 0251     PTT 24 seconds     CBC and differential [[id number]]  (Abnormal) Collected: 02/20/23 0217    Lab Status: Final result Specimen: Blood from Arm, Left Updated: 02/20/23 0231     WBC 15 36 Thousand/uL      RBC 4 82 Million/uL      Hemoglobin 13 8 g/dL      Hematocrit 42 5 %      MCV 88 fL      MCH 28 6 pg      MCHC 32 5 g/dL      RDW 13 8 %      MPV 8 5 fL Platelets 310 Thousands/uL      nRBC 0 /100 WBCs      Neutrophils Relative 86 %      Immat GRANS % 1 %      Lymphocytes Relative 7 %      Monocytes Relative 5 %      Eosinophils Relative 1 %      Basophils Relative 0 %      Neutrophils Absolute 13 30 Thousands/µL      Immature Grans Absolute 0 07 Thousand/uL      Lymphocytes Absolute 1 13 Thousands/µL      Monocytes Absolute 0 72 Thousand/µL      Eosinophils Absolute 0 09 Thousand/µL      Basophils Absolute 0 05 Thousands/µL     Blood culture #1 [[id number]] Collected: 02/20/23 0217    Lab Status: In process Specimen: Blood from Arm, Left Updated: 02/20/23 0230    Blood culture #2 [[id number]] Collected: 02/20/23 0217    Lab Status: In process Specimen: Blood from Arm, Left Updated: 02/20/23 0230    Stool Enteric Bacterial Panel by PCR [[id number]]     Lab Status: No result Specimen: Stool from Rectum     Clostridium difficile toxin by PCR with EIA [[id number]]     Lab Status: No result Specimen: Stool from Per Rectum                  PE Study with CT Abdomen and Pelvis with contrast   ED Interpretation by Alvin Darling MD (02/20 0503)   FINDINGS:     CHEST     PULMONARY ARTERIAL TREE:  No pulmonary embolus is seen      LUNGS:  Lungs are clear  There is no tracheal or endobronchial lesion      PLEURA:  Unremarkable      HEART/AORTA:  Heart is unremarkable for patient's age  No thoracic aortic aneurysm      MEDIASTINUM AND FILIPPO:  Unremarkable      CHEST WALL AND LOWER NECK:  Unremarkable      ABDOMEN     LIVER/BILIARY TREE:  Unremarkable      GALLBLADDER:  There are gallstone(s) within the gallbladder, without pericholecystic inflammatory changes      SPLEEN:  Unremarkable      PANCREAS:  There is fluid and fat stranding about the pancreas which may be due to pancreatitis  No discrete peripancreatic collection  No main pancreatic duct dilatation  No evidence of necrosis      ADRENAL GLANDS:  Unremarkable      KIDNEYS/URETERS:  Unremarkable   No hydronephrosis      STOMACH AND BOWEL:  Unremarkable      APPENDIX:  A normal appendix was visualized      ABDOMINOPELVIC CAVITY:  Mild ascites  No pneumoperitoneum  No ly   mphadenopathy      VESSELS:  Unremarkable for patient's age      PELVIS     REPRODUCTIVE ORGANS:  There is a peripherally calcified fat-containing lesion in the left ovary measuring 1 7 cm (series 306, image 129) likely due to a dermoid cyst   The uterus is unremarkable  The right ovary is unremarkable      URINARY BLADDER:  Unremarkable      ABDOMINAL WALL/INGUINAL REGIONS:  Unremarkable      OSSEOUS STRUCTURES:  No acute fracture or destructive osseous lesion      IMPRESSION:     1  No evidence of pulmonary embolism  2   Fluid and fat stranding about the pancreas likely due to pancreatitis  No discrete peripancreatic collection  No evidence of pancreatic necrosis  Mild ascites  3   Cholelithiasis without evidence of cholecystitis  4   1 7 cm peripherally calcified fat-containing lesion in the left ovary likely due to a dermoid cyst      The study was marked in EPIC for immediate notification      Workstation performed: [id number]         Final Result by Aman Hayden MD (02/20 0450)      1  No evidence of pulmonary embolism  2   Fluid and fat stranding about the pancreas likely due to pancreatitis  No discrete peripancreatic collection  No evidence of pancreatic necrosis  Mild ascites  3   Cholelithiasis without evidence of cholecystitis  4   1 7 cm peripherally calcified fat-containing lesion in the left ovary likely due to a dermoid cyst       The study was marked in EPIC for immediate notification  Workstation performed: [id number]         XR chest 1 view portable   ED Interpretation by Pedro Freedman MD (02/20 0113)   No acute disease read by me                    Procedures  ECG 12 Lead Documentation Only    Date/Time: 2/20/2023 2:27 AM  Performed by: Pedro Freedman MD  Authorized by: Pedro Freedman MD Indications / Diagnosis:  Chest pain, sob  ECG reviewed by me, the ED Provider: yes    Patient location:  ED  Previous ECG:     Previous ECG:  Unavailable  Rate:     ECG rate:  68    ECG rate assessment: normal    Rhythm:     Rhythm: sinus rhythm      Rhythm comment:  S  arrhythmia  Ectopy:     Ectopy: none    QRS:     QRS axis:  Normal    QRS intervals:  Normal  Conduction:     Conduction normal: low voltage  ST segments:     ST segments:  Normal  T waves:     T waves: normal    Other findings:     Other findings: poor R wave progression               ED Course  ED Course as of 02/20/23 0537   Mon Feb 20, 2023   0321 D-Dimer, Quant(!): 2 98  CT PE with abdomen and pelvis ordered  0324 Lipase(!): [id number]  IVFs being given   0324 EKG, CXR and labs d/w patient and  with patient's permission  Declines pain medication at this time  Occasional etoh use after birth  9028 CT d/w patient and                           PERC Rule for PE    Flowsheet Row Most Recent Value   PERC Rule for PE    Age >=50 --   HR >=100 --   O2 Sat on room air < 95% --   History of PE or DVT --   Recent trauma or surgery 1 Filed at: 02/20/2023 6644   Hemoptysis --   Exogenous estrogen --   Unilateral leg swelling --   PERC Rule for PE Results 1 Filed at: 02/20/2023 5246                  Wells' Criteria for PE    Flowsheet Row Most Recent Value   Wells' Criteria for PE    Clinical signs and symptoms of DVT 0 Filed at: 02/20/2023 4099   PE is primary diagnosis or equally likely 3 Filed at: 02/20/2023 0322   HR >100 0 Filed at: 02/20/2023 0322   Immobilization at least 3 days or Surgery in the previous 4 weeks 1 5 Filed at: 02/20/2023 1247   Previous, objectively diagnosed PE or DVT 0 Filed at: 02/20/2023 0322   Hemoptysis 0 Filed at: 02/20/2023 7565   Malignancy with treatment within 6 months or palliative 0 Filed at: 02/20/2023 6640   Wells' Criteria Total 4 5 Filed at: 02/20/2023 6802                Medical Decision Making  DDx including but not limited to: gastroenteritis, food poisoning, metabolic abnormality, dehydration, LINNEA, mesenteric adenitis, appendicitis, IBD, IBS, ileus, bowel obstruction, toxic megacolon, colitis, enteritis, gastritis, PUD, GERD, hepatitis, pancreatitis, cholecystitis, biliary colic, choledocholithiasis, doubt splenic etiology; renal colic, pyelonephritis, UTI  DDX including but not limited to: pneumonia, pleural effusion, CHF, PE, PTX, ACS, MI, asthma exacerbation; doubt covid, dissection, rhabdomyolysis; anemia, metabolic abnormality, renal failure  Amount and/or Complexity of Data Reviewed  Labs: ordered  Decision-making details documented in ED Course  Radiology: ordered and independent interpretation performed  Decision-making details documented in ED Course  ECG/medicine tests: ordered and independent interpretation performed  Decision-making details documented in ED Course  Risk  Prescription drug management            Disposition  Final diagnoses:   Pancreatitis   Elevated LFTs   Gallstone   Nausea vomiting and diarrhea   Chest pain     Time reflects when diagnosis was documented in both MDM as applicable and the Disposition within this note     Time User Action Codes Description Comment    2/20/2023  3:24 AM Constanza Los Add [K85 90] Pancreatitis     2/20/2023  3:24 AM Constanza Los Add [R79 89] Elevated LFTs     2/20/2023  5:04 AM Constanza Los Add [K80 20] Gallstone     2/20/2023  5:04 AM Constanza Los Add [R11 2,  R19 7] Nausea vomiting and diarrhea     2/20/2023  5:04 AM Constanza Los Add [R07 9] Chest pain       ED Disposition     ED Disposition   Admit    Condition   Stable    Date/Time   Mon Feb 20, 2023  5:35 AM    Comment   Case was discussed with [facility] resident Dr Angie Parker and the patient's admission status was agreed to be Admission Status: inpatient status to the service of Dr Beni Krishnan             Follow-up Information    None Patient's Medications   Discharge Prescriptions    No medications on file       No discharge procedures on file      PDMP Review       Value Time User    PDMP Reviewed  Yes 2/20/2023  1:55 AM Benjamin Thornton MD          ED Provider  Electronically Signed by           Benjamin Thornton MD  02/20/23 Via Brennan Hester MD  02/20/23 0853

## 2023-02-20 NOTE — H&P
Nenita WILHELM&Moise Alessandra OraaronFesydney [birth year], 27 y o  female MRN: [id number]  Unit/Bed#: W -01 Encounter: [id number]  Primary Care Provider: Shaina Estes MD   Date and time admitted to hospital: 2/20/2023  1:40 AM    * Pancreatitis  Assessment & Plan  Complaining of epigastric abdominal pain with radiation to the back  Associated with nausea, vomiting, diarrhea onset 2 days ago and has progressively worsened  No history of EtOH abuse, gallstones  Lipase- 9922  CTA AP- Fluid and fat stranding about the pancreas likely due to pancreatitis, No discrete peripancreatic collection, No evidence of pancreatic necrosis, mild ascites, cholelithiasis without evidence of cholecystitis    Plan-  Bowel rest, NPO  Consider transitioning patient to clear liquid diet for dinner  IV fluids   Pain management  Triglycerides-pending    Elevated LFTs  Assessment & Plan  DDx including but not limited to: Hepatitis, pancreatitis, cholecystitis, biliary colic, choledocholithiasis  Consider possible obstructing stone not visualized on CT abdomen        T   Bili- 3 16    Plan-  RUQ ultrasound- Pending   Monitor CMP    SOB (shortness of breath)  Assessment & Plan  Likely secondary to abdominal pain  D-dimer 2 98  CTA- No pulmonary embolism  97% on RA, afebrile    Plan-  Cardiopulmonary monitoring   Pain management for abdominal pain   COVID/flu/RSV-pending      Chest pain  Assessment & Plan  DDX including but not limited to: chest wall pain, costochondritis, pleurisy, pericarditis, myocarditis, PTX, pneumonia, GI etiology; doubt ACS or MI or dissection or PE or rhabdomyolysis  Troponin- 3  EKG- Sinus arrhythmia, no ST T wave changes  CP resolved     Plan-  Monitor, no further workup necessary         Leukocytosis  Assessment & Plan  POA leukocytosis 15 36  Likely reactive in the setting of pancreatitis  Low suspicion for infection  Afebrile    Plan-  Monitor CBC  Fever trend  Blood culture-pending  C  difficile-pending  Stool enteric panel-pending    VTE Pharmacologic Prophylaxis: VTE Score: 1 Low Risk (Score 0-2) - Encourage Ambulation  Code Status: Level 1 - Full Code   Discussion with family: Updated  () at bedside  Anticipated Length of Stay: Patient will be admitted on an inpatient basis with an anticipated length of stay of greater than 2 midnights secondary to pancreatitis   Chief Complaint: Abdominal Pain     History of Present Illness:  Jaquan Leal is a 27 y o  female with a PMH of obesity, asthma, c section 1 month ago who presents with epigastric abdominal pain that radiated to the back with associated nausea, vomiting, and diarrhea  Patient was also complaining of chest pain and shortness of breath, all symptoms started 2 days prior to arrival and had been progressively worsening  Patient denies history of gallstones, pancreatitis, EtOH abuse, knowledge of high cholesterol, fever, chills, confusion, rashes, palpitations, or any other symptoms at this time  ED-  Vital signs afebrile, 127/72, 97% on room air, 82bpm  CBC-leukocytosis 15 36  CMP-elevated LFTs , , , T  bili 3 16  Lipase 9922  D-dimer 2 98  CTA AP- No evidence of pulmonary embolism, Fluid and fat stranding about the pancreas likely due to pancreatitis, No discrete peripancreatic collection, No evidence of pancreatic necrosis, Mild ascites, Cholelithiasis without evidence of cholecystitis, 1 7 cm peripherally calcified fat-containing lesion in the left ovary likely due to a dermoid cyst  ED gave NS 1L bolus, started NS infusion, famotidine, Zofran, NPO  Admitted for pancreatitis      Review of Systems:  Review of Systems   Constitutional: Negative for chills and fever  HENT: Negative for congestion, ear pain and sore throat  Eyes: Negative for pain and visual disturbance  Respiratory: Positive for shortness of breath  Negative for cough  Cardiovascular: Positive for chest pain  Negative for palpitations and leg swelling  Gastrointestinal: Positive for abdominal pain, diarrhea, nausea and vomiting  Genitourinary: Negative for dysuria and hematuria  Musculoskeletal: Negative for arthralgias and back pain  Skin: Negative for color change and rash  Neurological: Negative for seizures and syncope  All other systems reviewed and are negative  Past Medical and Surgical History:   Past Medical History:   Diagnosis Date   • Allergic    • Asthma    • Fibroadenoma of left breast        Past Surgical History:   Procedure Laterality Date   • CYST REMOVAL N/A     low back   • AR  DELIVERY ONLY N/A 2023    Procedure:  SECTION (); Surgeon: Kianna Perez MD;  Location: [facility] ;  Service: Obstetrics   • WISDOM TOOTH EXTRACTION         Meds/Allergies:  Prior to Admission medications    Medication Sig Start Date End Date Taking?  Authorizing Provider   albuterol (PROVENTIL HFA,VENTOLIN HFA) 90 mcg/act inhaler Inhale 2 puffs every 4 (four) hours as needed for wheezing 23  Yes Theresa Nur MD   Azelastine HCl 137 MCG/SPRAY SOLN 1 spray into each nostril 2 (two) times a day 23  Yes Theresa Nur MD   budesonide (RINOCORT AQUA) 32 MCG/ACT nasal spray 1 spray into each nostril daily   Yes Historical Provider, MD   docusate sodium (COLACE) 100 mg capsule Take 1 capsule (100 mg total) by mouth 2 (two) times a day 23  Yes Bina Akers MD   Fluticasone-Salmeterol (Advair Diskus) 250-50 mcg/dose inhaler Inhale 1 puff daily Rinse mouth after use  23  Yes Theresa Nur MD   Polyethylene Glycol 3350 (MIRALAX PO) Take by mouth   Yes Historical Provider, MD   Prenatal MV-Min-Fe Fum-FA-DHA (PRENATAL+DHA PO) Take by mouth   Yes Historical Provider, MD   Blood Glucose Monitoring Suppl (OneTouch Verio Flex System) w/Device KIT Test 4 times daily 10/4/22 2/14/23  BLAYNE Echavarria   Ascorbic Acid (VITAMIN C PO) Take by mouth  Patient not taking: Reported on 2/13/2023 2/20/23  Historical Provider, MD     I have reviewed home medications with patient personally  Allergies: Allergies   Allergen Reactions   • Aspirin Swelling     Swollen eyes   • Shellfish Allergy - Food Allergy Hives, Itching and Swelling       Social History:  Marital Status: /Civil Union   Occupation: Employed  Patient Pre-hospital Living Situation: Home  Patient Pre-hospital Level of Mobility: walks  Patient Pre-hospital Diet Restrictions: None  Substance Use History:   Social History     Substance and Sexual Activity   Alcohol Use Not Currently    Comment: rarely     Social History     Tobacco Use   Smoking Status Never   Smokeless Tobacco Never     Social History     Substance and Sexual Activity   Drug Use Not Currently    Comment: marijauna - 1 year ago       Family History:  Family History   Problem Relation Age of Onset   • Hypertension Mother    • Arthritis Mother    • Hypothyroidism Mother    • Hypertension Father    • Hypothyroidism Sister    • Hypothyroidism Sister    • Arthritis Maternal Grandmother    • Cancer Maternal Grandfather    • Stroke Paternal Grandmother    • No Known Problems Paternal Grandfather        Physical Exam:     Vitals:   Blood Pressure: 127/72 (02/20/23 0146)  Pulse: 82 (02/20/23 0146)  Temperature: 98 5 °F (36 9 °C) (02/20/23 0146)  Temp Source: Oral (02/20/23 0146)  Respirations: 20 (02/20/23 0146)  SpO2: 98 % (02/20/23 0330)    Physical Exam  Vitals and nursing note reviewed  Constitutional:       General: She is not in acute distress  Appearance: Normal appearance  She is well-developed  She is obese  She is not ill-appearing, toxic-appearing or diaphoretic  HENT:      Head: Normocephalic and atraumatic  Nose: Nose normal       Mouth/Throat:      Mouth: Mucous membranes are moist    Eyes:      Extraocular Movements: Extraocular movements intact        Conjunctiva/sclera: Conjunctivae normal  Pupils: Pupils are equal, round, and reactive to light  Cardiovascular:      Rate and Rhythm: Normal rate and regular rhythm  Pulses: Normal pulses  Heart sounds: Normal heart sounds  No murmur heard  No friction rub  No gallop  Pulmonary:      Effort: Pulmonary effort is normal  No respiratory distress  Breath sounds: Normal breath sounds  No wheezing or rhonchi  Chest:      Chest wall: No tenderness  Abdominal:      General: Bowel sounds are normal  There is no distension  Palpations: Abdomen is soft  Tenderness: There is abdominal tenderness  There is no right CVA tenderness or left CVA tenderness  Comments: Diffuse abdominal tenderness to light palpation   Musculoskeletal:         General: No tenderness  Normal range of motion  Cervical back: Normal range of motion and neck supple  Right lower leg: No edema  Left lower leg: No edema  Skin:     General: Skin is warm and dry  Capillary Refill: Capillary refill takes less than 2 seconds  Neurological:      Mental Status: She is alert and oriented to person, place, and time  Mental status is at baseline  Cranial Nerves: No cranial nerve deficit  Sensory: No sensory deficit  Motor: No weakness  Coordination: Coordination normal       Gait: Gait normal    Psychiatric:         Mood and Affect: Mood normal          Behavior: Behavior normal          Thought Content:  Thought content normal           Additional Data:     Lab Results:  Results from last 7 days   Lab Units 02/20/23  0217   WBC Thousand/uL 15 36*   HEMOGLOBIN g/dL 13 8   HEMATOCRIT % 42 5   PLATELETS Thousands/uL 435*   NEUTROS PCT % 86*   LYMPHS PCT % 7*   MONOS PCT % 5   EOS PCT % 1     Results from last 7 days   Lab Units 02/20/23  0217   SODIUM mmol/L 139   POTASSIUM mmol/L 3 9   CHLORIDE mmol/L 106   CO2 mmol/L 25   BUN mg/dL 13   CREATININE mg/dL 0 83   ANION GAP mmol/L 8   CALCIUM mg/dL 9 4   ALBUMIN g/dL 4 3   TOTAL BILIRUBIN mg/dL 3 16*   ALK PHOS U/L 190*   ALT U/L 222*   AST U/L 214*   GLUCOSE RANDOM mg/dL 113     Results from last 7 days   Lab Units 02/20/23  0217   INR  0 94             Results from last 7 days   Lab Units 02/20/23  0217   LACTIC ACID mmol/L 0 7       Lines/Drains:  Invasive Devices     Peripheral Intravenous Line  Duration           Peripheral IV 02/20/23 Left Antecubital <1 day                    Imaging: Reviewed radiology reports from this admission including: chest xray and abdominal/pelvic CT  PE Study with CT Abdomen and Pelvis with contrast   ED Interpretation by Mckinley Zayas MD (02/20 2891)   FINDINGS:     CHEST     PULMONARY ARTERIAL TREE:  No pulmonary embolus is seen      LUNGS:  Lungs are clear  There is no tracheal or endobronchial lesion      PLEURA:  Unremarkable      HEART/AORTA:  Heart is unremarkable for patient's age  No thoracic aortic aneurysm      MEDIASTINUM AND FILIPPO:  Unremarkable      CHEST WALL AND LOWER NECK:  Unremarkable      ABDOMEN     LIVER/BILIARY TREE:  Unremarkable      GALLBLADDER:  There are gallstone(s) within the gallbladder, without pericholecystic inflammatory changes      SPLEEN:  Unremarkable      PANCREAS:  There is fluid and fat stranding about the pancreas which may be due to pancreatitis  No discrete peripancreatic collection  No main pancreatic duct dilatation  No evidence of necrosis      ADRENAL GLANDS:  Unremarkable      KIDNEYS/URETERS:  Unremarkable  No hydronephrosis      STOMACH AND BOWEL:  Unremarkable      APPENDIX:  A normal appendix was visualized      ABDOMINOPELVIC CAVITY:  Mild ascites  No pneumoperitoneum  No ly   mphadenopathy      VESSELS:  Unremarkable for patient's age      PELVIS     REPRODUCTIVE ORGANS:  There is a peripherally calcified fat-containing lesion in the left ovary measuring 1 7 cm (series 306, image 129) likely due to a dermoid cyst   The uterus is unremarkable    The right ovary is unremarkable      URINARY BLADDER:  Unremarkable      ABDOMINAL WALL/INGUINAL REGIONS:  Unremarkable      OSSEOUS STRUCTURES:  No acute fracture or destructive osseous lesion      IMPRESSION:     1  No evidence of pulmonary embolism  2   Fluid and fat stranding about the pancreas likely due to pancreatitis  No discrete peripancreatic collection  No evidence of pancreatic necrosis  Mild ascites  3   Cholelithiasis without evidence of cholecystitis  4   1 7 cm peripherally calcified fat-containing lesion in the left ovary likely due to a dermoid cyst      The study was marked in EPIC for immediate notification      Workstation performed: [id number]         Final Result by Mirtha De Los Santos MD (02/20 0450)      1  No evidence of pulmonary embolism  2   Fluid and fat stranding about the pancreas likely due to pancreatitis  No discrete peripancreatic collection  No evidence of pancreatic necrosis  Mild ascites  3   Cholelithiasis without evidence of cholecystitis  4   1 7 cm peripherally calcified fat-containing lesion in the left ovary likely due to a dermoid cyst       The study was marked in EPIC for immediate notification  Workstation performed: [id number]         XR chest 1 view portable   ED Interpretation by Zia Hernandez MD (02/20 1214)   No acute disease read by me  US right upper quadrant    (Results Pending)       EKG and Other Studies Reviewed on Admission:   · EKG: Sinus arrhythmia 82 no ST-T wave changes  ** Please Note: This note has been constructed using a voice recognition system   **

## 2023-02-20 NOTE — PLAN OF CARE
Problem: PAIN - ADULT  Goal: Verbalizes/displays adequate comfort level or baseline comfort level  Description: Interventions:  - Encourage patient to monitor pain and request assistance  - Assess pain using appropriate pain scale  - Administer analgesics based on type and severity of pain and evaluate response  - Implement non-pharmacological measures as appropriate and evaluate response  - Consider cultural and social influences on pain and pain management  - Notify physician/advanced practitioner if interventions unsuccessful or patient reports new pain  Outcome: Progressing     Problem: SAFETY ADULT  Goal: Patient will remain free of falls  Description: INTERVENTIONS:  - Educate patient/family on patient safety including physical limitations  - Instruct patient to call for assistance with activity   - Consult OT/PT to assist with strengthening/mobility   - Keep Call bell within reach  - Keep bed low and locked with side rails adjusted as appropriate  - Keep care items and personal belongings within reach  - Initiate and maintain comfort rounds  - Make Fall Risk Sign visible to staff  - Offer Toileting every  Hours, in advance of need  - Initiate/Maintain alarm  - Obtain necessary fall risk management equipment:   - Apply yellow socks and bracelet for high fall risk patients  - Consider moving patient to room near nurses station  Outcome: Progressing  Goal: Maintain or return to baseline ADL function  Description: INTERVENTIONS:  -  Assess patient's ability to carry out ADLs; assess patient's baseline for ADL function and identify physical deficits which impact ability to perform ADLs (bathing, care of mouth/teeth, toileting, grooming, dressing, etc )  - Assess/evaluate cause of self-care deficits   - Assess range of motion  - Assess patient's mobility; develop plan if impaired  - Assess patient's need for assistive devices and provide as appropriate  - Encourage maximum independence but intervene and supervise when necessary  - Involve family in performance of ADLs  - Assess for home care needs following discharge   - Consider OT consult to assist with ADL evaluation and planning for discharge  - Provide patient education as appropriate  Outcome: Progressing  Goal: Maintains/Returns to pre admission functional level  Description: INTERVENTIONS:  - Perform BMAT or MOVE assessment daily    - Set and communicate daily mobility goal to care team and patient/family/caregiver  - Collaborate with rehabilitation services on mobility goals if consulted  - Perform Range of Motion  times a day  - Reposition patient every  hours  - Dangle patient  times a day  - Stand patient  times a day  - Ambulate patient  times a day  - Out of bed to chair  times a day   - Out of bed for meals times a day  - Out of bed for toileting  - Record patient progress and toleration of activity level   Outcome: Progressing     Problem: DISCHARGE PLANNING  Goal: Discharge to home or other facility with appropriate resources  Description: INTERVENTIONS:  - Identify barriers to discharge w/patient and caregiver  - Arrange for needed discharge resources and transportation as appropriate  - Identify discharge learning needs (meds, wound care, etc )  - Arrange for interpretive services to assist at discharge as needed  - Refer to Case Management Department for coordinating discharge planning if the patient needs post-hospital services based on physician/advanced practitioner order or complex needs related to functional status, cognitive ability, or social support system  Outcome: Progressing     Problem: Knowledge Deficit  Goal: Patient/family/caregiver demonstrates understanding of disease process, treatment plan, medications, and discharge instructions  Description: Complete learning assessment and assess knowledge base    Interventions:  - Provide teaching at level of understanding  - Provide teaching via preferred learning methods  Outcome: Progressing Problem: METABOLIC, FLUID AND ELECTROLYTES - ADULT  Goal: Electrolytes maintained within normal limits  Description: INTERVENTIONS:  - Monitor labs and assess patient for signs and symptoms of electrolyte imbalances  - Administer electrolyte replacement as ordered  - Monitor response to electrolyte replacements, including repeat lab results as appropriate  - Instruct patient on fluid and nutrition as appropriate  Outcome: Progressing  Goal: Fluid balance maintained  Description: INTERVENTIONS:  - Monitor labs   - Monitor I/O and WT  - Instruct patient on fluid and nutrition as appropriate  - Assess for signs & symptoms of volume excess or deficit  Outcome: Progressing

## 2023-02-20 NOTE — ASSESSMENT & PLAN NOTE
Likely secondary to abdominal pain  D-dimer 2 98  CTA- No pulmonary embolism  97% on RA, afebrile    Plan-  Cardiopulmonary monitoring   Pain management for abdominal pain   COVID/flu/RSV-pending

## 2023-02-20 NOTE — ASSESSMENT & PLAN NOTE
DDx including but not limited to: Hepatitis, pancreatitis, cholecystitis, biliary colic, choledocholithiasis  Consider possible obstructing stone not visualized on CT abdomen        T   Bili- 3 16    Plan-  RUQ ultrasound- Pending   Monitor CMP

## 2023-02-20 NOTE — ED NOTES
Pt ambulatory to restroom at this time to provide urine sample         Cary Chaidez RN  02/20/23 9427

## 2023-02-20 NOTE — ASSESSMENT & PLAN NOTE
POA leukocytosis 15 36  Likely reactive in the setting of pancreatitis  Low suspicion for infection  Afebrile    Plan-  Monitor CBC  Fever trend  Blood culture-pending  C  difficile-pending  Stool enteric panel-pending

## 2023-02-20 NOTE — ASSESSMENT & PLAN NOTE
Complaining of epigastric abdominal pain with radiation to the back  Associated with nausea, vomiting, diarrhea onset 2 days ago and has progressively worsened  No history of EtOH abuse, gallstones  Lipase- 9922  CTA AP- Fluid and fat stranding about the pancreas likely due to pancreatitis, No discrete peripancreatic collection, No evidence of pancreatic necrosis, mild ascites, cholelithiasis without evidence of cholecystitis    Plan-  Bowel rest, NPO  Consider transitioning patient to clear liquid diet for dinner  IV fluids   Pain management  Triglycerides-pending

## 2023-02-21 ENCOUNTER — APPOINTMENT (INPATIENT)
Dept: RADIOLOGY | Facility: HOSPITAL | Age: 33
End: 2023-02-21

## 2023-02-21 ENCOUNTER — ANESTHESIA EVENT (INPATIENT)
Dept: PERIOP | Facility: HOSPITAL | Age: 33
End: 2023-02-21

## 2023-02-21 ENCOUNTER — ANESTHESIA (INPATIENT)
Dept: PERIOP | Facility: HOSPITAL | Age: 33
End: 2023-02-21

## 2023-02-21 PROBLEM — R06.02 SOB (SHORTNESS OF BREATH): Status: RESOLVED | Noted: 2023-02-20 | Resolved: 2023-02-21

## 2023-02-21 LAB
ALBUMIN SERPL BCP-MCNC: 3.4 G/DL (ref 3.5–5)
ALP SERPL-CCNC: 139 U/L (ref 34–104)
ALT SERPL W P-5'-P-CCNC: 106 U/L (ref 7–52)
ANION GAP SERPL CALCULATED.3IONS-SCNC: 8 MMOL/L (ref 4–13)
AST SERPL W P-5'-P-CCNC: 43 U/L (ref 13–39)
ATRIAL RATE: 68 BPM
BASOPHILS # BLD AUTO: 0.04 THOUSANDS/ÂΜL (ref 0–0.1)
BASOPHILS NFR BLD AUTO: 0 % (ref 0–1)
BILIRUB SERPL-MCNC: 0.92 MG/DL (ref 0.2–1)
BUN SERPL-MCNC: 15 MG/DL (ref 5–25)
CALCIUM ALBUM COR SERPL-MCNC: 8.7 MG/DL (ref 8.3–10.1)
CALCIUM SERPL-MCNC: 8.2 MG/DL (ref 8.4–10.2)
CHLORIDE SERPL-SCNC: 110 MMOL/L (ref 96–108)
CO2 SERPL-SCNC: 20 MMOL/L (ref 21–32)
CREAT SERPL-MCNC: 0.72 MG/DL (ref 0.6–1.3)
EOSINOPHIL # BLD AUTO: 0.18 THOUSAND/ÂΜL (ref 0–0.61)
EOSINOPHIL NFR BLD AUTO: 2 % (ref 0–6)
ERYTHROCYTE [DISTWIDTH] IN BLOOD BY AUTOMATED COUNT: 13.7 % (ref 11.6–15.1)
GFR SERPL CREATININE-BSD FRML MDRD: 109 ML/MIN/1.73SQ M
GLUCOSE SERPL-MCNC: 70 MG/DL (ref 65–140)
HCT VFR BLD AUTO: 35.2 % (ref 34.8–46.1)
HGB BLD-MCNC: 11.3 G/DL (ref 11.5–15.4)
IMM GRANULOCYTES # BLD AUTO: 0.05 THOUSAND/UL (ref 0–0.2)
IMM GRANULOCYTES NFR BLD AUTO: 1 % (ref 0–2)
LIPASE SERPL-CCNC: 665 U/L (ref 11–82)
LYMPHOCYTES # BLD AUTO: 1.98 THOUSANDS/ÂΜL (ref 0.6–4.47)
LYMPHOCYTES NFR BLD AUTO: 22 % (ref 14–44)
MCH RBC QN AUTO: 28.8 PG (ref 26.8–34.3)
MCHC RBC AUTO-ENTMCNC: 32.1 G/DL (ref 31.4–37.4)
MCV RBC AUTO: 90 FL (ref 82–98)
MONOCYTES # BLD AUTO: 0.54 THOUSAND/ÂΜL (ref 0.17–1.22)
MONOCYTES NFR BLD AUTO: 6 % (ref 4–12)
NEUTROPHILS # BLD AUTO: 6.15 THOUSANDS/ÂΜL (ref 1.85–7.62)
NEUTS SEG NFR BLD AUTO: 69 % (ref 43–75)
NRBC BLD AUTO-RTO: 0 /100 WBCS
P AXIS: 52 DEGREES
PLATELET # BLD AUTO: 329 THOUSANDS/UL (ref 149–390)
PMV BLD AUTO: 8.5 FL (ref 8.9–12.7)
POTASSIUM SERPL-SCNC: 3.7 MMOL/L (ref 3.5–5.3)
PR INTERVAL: 154 MS
PROT SERPL-MCNC: 5.9 G/DL (ref 6.4–8.4)
QRS AXIS: 0 DEGREES
QRSD INTERVAL: 86 MS
QT INTERVAL: 396 MS
QTC INTERVAL: 421 MS
RBC # BLD AUTO: 3.93 MILLION/UL (ref 3.81–5.12)
SODIUM SERPL-SCNC: 138 MMOL/L (ref 135–147)
T WAVE AXIS: 32 DEGREES
TRIGL SERPL-MCNC: 73 MG/DL
VENTRICULAR RATE: 68 BPM
WBC # BLD AUTO: 8.94 THOUSAND/UL (ref 4.31–10.16)

## 2023-02-21 PROCEDURE — BF141ZZ FLUOROSCOPY OF GALLBLADDER, BILE DUCTS AND PANCREATIC DUCTS USING LOW OSMOLAR CONTRAST: ICD-10-PCS | Performed by: SURGERY

## 2023-02-21 PROCEDURE — 0FT44ZZ RESECTION OF GALLBLADDER, PERCUTANEOUS ENDOSCOPIC APPROACH: ICD-10-PCS | Performed by: SURGERY

## 2023-02-21 RX ORDER — NEOSTIGMINE METHYLSULFATE 1 MG/ML
INJECTION INTRAVENOUS AS NEEDED
Status: DISCONTINUED | OUTPATIENT
Start: 2023-02-21 | End: 2023-02-21

## 2023-02-21 RX ORDER — SODIUM CHLORIDE, SODIUM LACTATE, POTASSIUM CHLORIDE, CALCIUM CHLORIDE 600; 310; 30; 20 MG/100ML; MG/100ML; MG/100ML; MG/100ML
INJECTION, SOLUTION INTRAVENOUS CONTINUOUS PRN
Status: DISCONTINUED | OUTPATIENT
Start: 2023-02-21 | End: 2023-02-21

## 2023-02-21 RX ORDER — CEFAZOLIN SODIUM 2 G/50ML
SOLUTION INTRAVENOUS AS NEEDED
Status: DISCONTINUED | OUTPATIENT
Start: 2023-02-21 | End: 2023-02-21

## 2023-02-21 RX ORDER — SODIUM CHLORIDE, SODIUM GLUCONATE, SODIUM ACETATE, POTASSIUM CHLORIDE, MAGNESIUM CHLORIDE, SODIUM PHOSPHATE, DIBASIC, AND POTASSIUM PHOSPHATE .53; .5; .37; .037; .03; .012; .00082 G/100ML; G/100ML; G/100ML; G/100ML; G/100ML; G/100ML; G/100ML
200 INJECTION, SOLUTION INTRAVENOUS CONTINUOUS
Status: DISCONTINUED | OUTPATIENT
Start: 2023-02-21 | End: 2023-02-21

## 2023-02-21 RX ORDER — HYDROMORPHONE HCL/PF 1 MG/ML
SYRINGE (ML) INJECTION AS NEEDED
Status: DISCONTINUED | OUTPATIENT
Start: 2023-02-21 | End: 2023-02-21

## 2023-02-21 RX ORDER — DEXAMETHASONE SODIUM PHOSPHATE 10 MG/ML
INJECTION, SOLUTION INTRAMUSCULAR; INTRAVENOUS AS NEEDED
Status: DISCONTINUED | OUTPATIENT
Start: 2023-02-21 | End: 2023-02-21

## 2023-02-21 RX ORDER — HYDROCODONE BITARTRATE AND ACETAMINOPHEN 5; 325 MG/1; MG/1
1 TABLET ORAL EVERY 6 HOURS PRN
Status: DISCONTINUED | OUTPATIENT
Start: 2023-02-21 | End: 2023-02-21

## 2023-02-21 RX ORDER — SUCCINYLCHOLINE/SOD CL,ISO/PF 100 MG/5ML
SYRINGE (ML) INTRAVENOUS AS NEEDED
Status: DISCONTINUED | OUTPATIENT
Start: 2023-02-21 | End: 2023-02-21

## 2023-02-21 RX ORDER — FENTANYL CITRATE/PF 50 MCG/ML
50 SYRINGE (ML) INJECTION
Status: DISCONTINUED | OUTPATIENT
Start: 2023-02-21 | End: 2023-02-21

## 2023-02-21 RX ORDER — PROPOFOL 10 MG/ML
INJECTION, EMULSION INTRAVENOUS AS NEEDED
Status: DISCONTINUED | OUTPATIENT
Start: 2023-02-21 | End: 2023-02-21

## 2023-02-21 RX ORDER — LIDOCAINE HYDROCHLORIDE 10 MG/ML
INJECTION, SOLUTION EPIDURAL; INFILTRATION; INTRACAUDAL; PERINEURAL AS NEEDED
Status: DISCONTINUED | OUTPATIENT
Start: 2023-02-21 | End: 2023-02-21

## 2023-02-21 RX ORDER — SODIUM CHLORIDE, SODIUM LACTATE, POTASSIUM CHLORIDE, CALCIUM CHLORIDE 600; 310; 30; 20 MG/100ML; MG/100ML; MG/100ML; MG/100ML
50 INJECTION, SOLUTION INTRAVENOUS CONTINUOUS
Status: DISCONTINUED | OUTPATIENT
Start: 2023-02-21 | End: 2023-02-21

## 2023-02-21 RX ORDER — ACETAMINOPHEN 325 MG/1
650 TABLET ORAL EVERY 6 HOURS SCHEDULED
Status: DISCONTINUED | OUTPATIENT
Start: 2023-02-22 | End: 2023-02-23 | Stop reason: HOSPADM

## 2023-02-21 RX ORDER — HYDROMORPHONE HCL/PF 1 MG/ML
0.4 SYRINGE (ML) INJECTION
Status: DISCONTINUED | OUTPATIENT
Start: 2023-02-21 | End: 2023-02-21

## 2023-02-21 RX ORDER — MAGNESIUM HYDROXIDE 1200 MG/15ML
LIQUID ORAL AS NEEDED
Status: DISCONTINUED | OUTPATIENT
Start: 2023-02-21 | End: 2023-02-21 | Stop reason: HOSPADM

## 2023-02-21 RX ORDER — ONDANSETRON 2 MG/ML
4 INJECTION INTRAMUSCULAR; INTRAVENOUS ONCE AS NEEDED
Status: DISCONTINUED | OUTPATIENT
Start: 2023-02-21 | End: 2023-02-21

## 2023-02-21 RX ORDER — GLYCOPYRROLATE 0.2 MG/ML
INJECTION INTRAMUSCULAR; INTRAVENOUS AS NEEDED
Status: DISCONTINUED | OUTPATIENT
Start: 2023-02-21 | End: 2023-02-21

## 2023-02-21 RX ORDER — CEFAZOLIN SODIUM 2 G/50ML
SOLUTION INTRAVENOUS
Status: COMPLETED
Start: 2023-02-21 | End: 2023-02-21

## 2023-02-21 RX ORDER — DEXMEDETOMIDINE HYDROCHLORIDE 100 UG/ML
INJECTION, SOLUTION INTRAVENOUS AS NEEDED
Status: DISCONTINUED | OUTPATIENT
Start: 2023-02-21 | End: 2023-02-21

## 2023-02-21 RX ORDER — ONDANSETRON 2 MG/ML
INJECTION INTRAMUSCULAR; INTRAVENOUS AS NEEDED
Status: DISCONTINUED | OUTPATIENT
Start: 2023-02-21 | End: 2023-02-21

## 2023-02-21 RX ORDER — PROPOFOL 10 MG/ML
INJECTION, EMULSION INTRAVENOUS CONTINUOUS PRN
Status: DISCONTINUED | OUTPATIENT
Start: 2023-02-21 | End: 2023-02-21

## 2023-02-21 RX ORDER — BUPIVACAINE HYDROCHLORIDE AND EPINEPHRINE 2.5; 5 MG/ML; UG/ML
INJECTION, SOLUTION EPIDURAL; INFILTRATION; INTRACAUDAL; PERINEURAL AS NEEDED
Status: DISCONTINUED | OUTPATIENT
Start: 2023-02-21 | End: 2023-02-21 | Stop reason: HOSPADM

## 2023-02-21 RX ORDER — ROCURONIUM BROMIDE 10 MG/ML
INJECTION, SOLUTION INTRAVENOUS AS NEEDED
Status: DISCONTINUED | OUTPATIENT
Start: 2023-02-21 | End: 2023-02-21

## 2023-02-21 RX ORDER — FENTANYL CITRATE 50 UG/ML
INJECTION, SOLUTION INTRAMUSCULAR; INTRAVENOUS AS NEEDED
Status: DISCONTINUED | OUTPATIENT
Start: 2023-02-21 | End: 2023-02-21

## 2023-02-21 RX ORDER — SODIUM CHLORIDE, SODIUM GLUCONATE, SODIUM ACETATE, POTASSIUM CHLORIDE, MAGNESIUM CHLORIDE, SODIUM PHOSPHATE, DIBASIC, AND POTASSIUM PHOSPHATE .53; .5; .37; .037; .03; .012; .00082 G/100ML; G/100ML; G/100ML; G/100ML; G/100ML; G/100ML; G/100ML
75 INJECTION, SOLUTION INTRAVENOUS CONTINUOUS
Status: DISCONTINUED | OUTPATIENT
Start: 2023-02-21 | End: 2023-02-23 | Stop reason: HOSPADM

## 2023-02-21 RX ORDER — HYDROCODONE BITARTRATE AND ACETAMINOPHEN 5; 325 MG/1; MG/1
1 TABLET ORAL EVERY 4 HOURS PRN
Status: DISCONTINUED | OUTPATIENT
Start: 2023-02-21 | End: 2023-02-23 | Stop reason: HOSPADM

## 2023-02-21 RX ORDER — FAMOTIDINE 10 MG/ML
20 INJECTION, SOLUTION INTRAVENOUS EVERY 12 HOURS SCHEDULED
Status: DISCONTINUED | OUTPATIENT
Start: 2023-02-21 | End: 2023-02-21

## 2023-02-21 RX ORDER — SODIUM CHLORIDE 9 MG/ML
INJECTION, SOLUTION INTRAVENOUS AS NEEDED
Status: DISCONTINUED | OUTPATIENT
Start: 2023-02-21 | End: 2023-02-21 | Stop reason: HOSPADM

## 2023-02-21 RX ORDER — MIDAZOLAM HYDROCHLORIDE 2 MG/2ML
INJECTION, SOLUTION INTRAMUSCULAR; INTRAVENOUS AS NEEDED
Status: DISCONTINUED | OUTPATIENT
Start: 2023-02-21 | End: 2023-02-21

## 2023-02-21 RX ADMIN — ONDANSETRON 4 MG: 2 INJECTION INTRAMUSCULAR; INTRAVENOUS at 17:46

## 2023-02-21 RX ADMIN — FENTANYL CITRATE 100 MCG: 50 INJECTION, SOLUTION INTRAMUSCULAR; INTRAVENOUS at 17:36

## 2023-02-21 RX ADMIN — SODIUM CHLORIDE, SODIUM LACTATE, POTASSIUM CHLORIDE, AND CALCIUM CHLORIDE: .6; .31; .03; .02 INJECTION, SOLUTION INTRAVENOUS at 18:33

## 2023-02-21 RX ADMIN — PROPOFOL 250 MG: 10 INJECTION, EMULSION INTRAVENOUS at 17:36

## 2023-02-21 RX ADMIN — HYDROMORPHONE HYDROCHLORIDE 1 MG: 1 INJECTION, SOLUTION INTRAMUSCULAR; INTRAVENOUS; SUBCUTANEOUS at 17:46

## 2023-02-21 RX ADMIN — DOCUSATE SODIUM 100 MG: 100 CAPSULE, LIQUID FILLED ORAL at 09:11

## 2023-02-21 RX ADMIN — MIDAZOLAM HYDROCHLORIDE 2 MG: 1 INJECTION, SOLUTION INTRAMUSCULAR; INTRAVENOUS at 17:26

## 2023-02-21 RX ADMIN — HYDROCODONE BITARTRATE AND ACETAMINOPHEN 1 TABLET: 5; 325 TABLET ORAL at 21:28

## 2023-02-21 RX ADMIN — Medication 100 MG: at 17:36

## 2023-02-21 RX ADMIN — ROCURONIUM BROMIDE 40 MG: 10 SOLUTION INTRAVENOUS at 17:46

## 2023-02-21 RX ADMIN — OXYCODONE HYDROCHLORIDE 10 MG: 10 TABLET ORAL at 09:13

## 2023-02-21 RX ADMIN — DEXAMETHASONE SODIUM PHOSPHATE 10 MG: 10 INJECTION, SOLUTION INTRAMUSCULAR; INTRAVENOUS at 17:38

## 2023-02-21 RX ADMIN — FAMOTIDINE 20 MG: 10 INJECTION, SOLUTION INTRAVENOUS at 10:45

## 2023-02-21 RX ADMIN — AZELASTINE 1 SPRAY: 1 SPRAY, METERED NASAL at 09:11

## 2023-02-21 RX ADMIN — PROPOFOL 30 MCG/KG/MIN: 10 INJECTION, EMULSION INTRAVENOUS at 17:38

## 2023-02-21 RX ADMIN — SODIUM CHLORIDE, SODIUM LACTATE, POTASSIUM CHLORIDE, AND CALCIUM CHLORIDE: .6; .31; .03; .02 INJECTION, SOLUTION INTRAVENOUS at 17:26

## 2023-02-21 RX ADMIN — GLYCOPYRROLATE 0.1 MG: 0.2 INJECTION, SOLUTION INTRAMUSCULAR; INTRAVENOUS at 17:29

## 2023-02-21 RX ADMIN — CEFAZOLIN SODIUM 2000 MG: 2 SOLUTION INTRAVENOUS at 17:39

## 2023-02-21 RX ADMIN — NEOSTIGMINE METHYLSULFATE 4 MG: 1 INJECTION INTRAVENOUS at 18:39

## 2023-02-21 RX ADMIN — DEXMEDETOMIDINE HYDROCHLORIDE 12 MCG: 100 INJECTION, SOLUTION INTRAVENOUS at 18:18

## 2023-02-21 RX ADMIN — SODIUM CHLORIDE, SODIUM GLUCONATE, SODIUM ACETATE, POTASSIUM CHLORIDE, MAGNESIUM CHLORIDE, SODIUM PHOSPHATE, DIBASIC, AND POTASSIUM PHOSPHATE 75 ML/HR: .53; .5; .37; .037; .03; .012; .00082 INJECTION, SOLUTION INTRAVENOUS at 20:01

## 2023-02-21 RX ADMIN — SODIUM CHLORIDE 200 ML/HR: 0.9 INJECTION, SOLUTION INTRAVENOUS at 09:13

## 2023-02-21 RX ADMIN — GLYCOPYRROLATE 0.8 MG: 0.2 INJECTION, SOLUTION INTRAMUSCULAR; INTRAVENOUS at 18:39

## 2023-02-21 RX ADMIN — SODIUM CHLORIDE, SODIUM GLUCONATE, SODIUM ACETATE, POTASSIUM CHLORIDE, MAGNESIUM CHLORIDE, SODIUM PHOSPHATE, DIBASIC, AND POTASSIUM PHOSPHATE 200 ML/HR: .53; .5; .37; .037; .03; .012; .00082 INJECTION, SOLUTION INTRAVENOUS at 12:07

## 2023-02-21 RX ADMIN — FLUTICASONE FUROATE AND VILANTEROL TRIFENATATE 1 PUFF: 200; 25 POWDER RESPIRATORY (INHALATION) at 09:10

## 2023-02-21 RX ADMIN — LIDOCAINE HYDROCHLORIDE 100 MG: 10 INJECTION, SOLUTION EPIDURAL; INFILTRATION; INTRACAUDAL at 17:36

## 2023-02-21 NOTE — PLAN OF CARE
Problem: PAIN - ADULT  Goal: Verbalizes/displays adequate comfort level or baseline comfort level  Description: Interventions:  - Encourage patient to monitor pain and request assistance  - Assess pain using appropriate pain scale  - Administer analgesics based on type and severity of pain and evaluate response  - Implement non-pharmacological measures as appropriate and evaluate response  - Consider cultural and social influences on pain and pain management  - Notify physician/advanced practitioner if interventions unsuccessful or patient reports new pain  Outcome: Progressing     Problem: SAFETY ADULT  Goal: Patient will remain free of falls  Description: INTERVENTIONS:  - Educate patient/family on patient safety including physical limitations  - Instruct patient to call for assistance with activity   - Consult OT/PT to assist with strengthening/mobility   - Keep Call bell within reach  - Keep bed low and locked with side rails adjusted as appropriate  - Keep care items and personal belongings within reach  - Initiate and maintain comfort rounds  - Make Fall Risk Sign visible to staff  - Offer Toileting every  Hours, in advance of need  - Initiate/Maintain alarm  - Obtain necessary fall risk management equipment:   - Apply yellow socks and bracelet for high fall risk patients  - Consider moving patient to room near nurses station  Outcome: Progressing     Problem: SAFETY ADULT  Goal: Maintain or return to baseline ADL function  Description: INTERVENTIONS:  -  Assess patient's ability to carry out ADLs; assess patient's baseline for ADL function and identify physical deficits which impact ability to perform ADLs (bathing, care of mouth/teeth, toileting, grooming, dressing, etc )  - Assess/evaluate cause of self-care deficits   - Assess range of motion  - Assess patient's mobility; develop plan if impaired  - Assess patient's need for assistive devices and provide as appropriate  - Encourage maximum independence but intervene and supervise when necessary  - Involve family in performance of ADLs  - Assess for home care needs following discharge   - Consider OT consult to assist with ADL evaluation and planning for discharge  - Provide patient education as appropriate  Outcome: Progressing     Problem: SAFETY ADULT  Goal: Maintains/Returns to pre admission functional level  Description: INTERVENTIONS:  - Perform BMAT or MOVE assessment daily    - Set and communicate daily mobility goal to care team and patient/family/caregiver  - Collaborate with rehabilitation services on mobility goals if consulted  - Perform Range of Motion  times a day  - Reposition patient every  hours  - Dangle patient  times a day  - Stand patient  times a day  - Ambulate patient  times a day  - Out of bed to chair  times a day   - Out of bed for meals times a day  - Out of bed for toileting  - Record patient progress and toleration of activity level   Outcome: Progressing     Problem: DISCHARGE PLANNING  Goal: Discharge to home or other facility with appropriate resources  Description: INTERVENTIONS:  - Identify barriers to discharge w/patient and caregiver  - Arrange for needed discharge resources and transportation as appropriate  - Identify discharge learning needs (meds, wound care, etc )  - Arrange for interpretive services to assist at discharge as needed  - Refer to Case Management Department for coordinating discharge planning if the patient needs post-hospital services based on physician/advanced practitioner order or complex needs related to functional status, cognitive ability, or social support system  Outcome: Progressing     Problem: Knowledge Deficit  Goal: Patient/family/caregiver demonstrates understanding of disease process, treatment plan, medications, and discharge instructions  Description: Complete learning assessment and assess knowledge base    Interventions:  - Provide teaching at level of understanding  - Provide teaching via preferred learning methods  Outcome: Progressing     Problem: METABOLIC, FLUID AND ELECTROLYTES - ADULT  Goal: Electrolytes maintained within normal limits  Description: INTERVENTIONS:  - Monitor labs and assess patient for signs and symptoms of electrolyte imbalances  - Administer electrolyte replacement as ordered  - Monitor response to electrolyte replacements, including repeat lab results as appropriate  - Instruct patient on fluid and nutrition as appropriate  Outcome: Progressing  Goal: Fluid balance maintained  Description: INTERVENTIONS:  - Monitor labs   - Monitor I/O and WT  - Instruct patient on fluid and nutrition as appropriate  - Assess for signs & symptoms of volume excess or deficit  Outcome: Progressing

## 2023-02-21 NOTE — ANESTHESIA PREPROCEDURE EVALUATION
Procedure:  CHOLECYSTECTOMY LAPAROSCOPIC W/ INTRAOP CHOLANGIOGRAM (Abdomen)    Relevant Problems   CARDIO   (+) Chest pain      GI/HEPATIC   (+) Pancreatitis      NEURO/PSYCH   (+) History of insulin controlled gestational diabetes mellitus        Physical Exam    Airway    Mallampati score: II  TM Distance: >3 FB  Neck ROM: full     Dental   No notable dental hx     Cardiovascular  Rhythm: regular, Rate: normal,     Pulmonary  Breath sounds clear to auscultation,     Other Findings  Intercisor Distance > 3cm          Anesthesia Plan  ASA Score- 2     Anesthesia Type- general with ASA Monitors  Additional Monitors:   Airway Plan: ETT  Comment: Discussed benefits/risks of general anesthesia including possibility of mouth/throat pain, injury to lips/teeth, nausea/vomiting, and surgical pain along with more rare complications such as stroke, MI, pneumonia, aspiration, and injury to blood vessels  Patient understands and wishes to proceed  All questions answered          Plan Factors-Exercise tolerance (METS): >4 METS  Chart reviewed  EKG reviewed  Existing labs reviewed  Induction- intravenous and rapid sequence induction  Postoperative Plan- Plan for postoperative opioid use  Planned trial extubation    Informed Consent- Anesthetic plan and risks discussed with patient  I personally reviewed this patient with the CRNA  Discussed and agreed on the Anesthesia Plan with the CRNA  Kal Valladares

## 2023-02-21 NOTE — ANESTHESIA POSTPROCEDURE EVALUATION
Post-Op Assessment Note    CV Status:  Stable    Pain management: adequate     Mental Status:  Lethargic and sleepy   Hydration Status:  Stable and unstable   PONV Controlled:  None   Airway Patency:  Patent      Post Op Vitals Reviewed: Yes      Staff: CRNA         No notable events documented      BP   108/59   Temp   98 3   Pulse  74   Resp   16   SpO2 96

## 2023-02-21 NOTE — PLAN OF CARE
Problem: PAIN - ADULT  Goal: Verbalizes/displays adequate comfort level or baseline comfort level  Description: Interventions:  - Encourage patient to monitor pain and request assistance  - Assess pain using appropriate pain scale  - Administer analgesics based on type and severity of pain and evaluate response  - Implement non-pharmacological measures as appropriate and evaluate response  - Consider cultural and social influences on pain and pain management  - Notify physician/advanced practitioner if interventions unsuccessful or patient reports new pain  Outcome: Progressing     Problem: SAFETY ADULT  Goal: Patient will remain free of falls  Description: INTERVENTIONS:  - Educate patient/family on patient safety including physical limitations  - Instruct patient to call for assistance with activity   - Consult OT/PT to assist with strengthening/mobility   - Keep Call bell within reach  - Keep bed low and locked with side rails adjusted as appropriate  - Keep care items and personal belongings within reach  - Initiate and maintain comfort rounds  - Make Fall Risk Sign visible to staff  - Apply yellow socks and bracelet for high fall risk patients  - Consider moving patient to room near nurses station  Outcome: Progressing  Goal: Maintain or return to baseline ADL function  Description: INTERVENTIONS:  -  Assess patient's ability to carry out ADLs; assess patient's baseline for ADL function and identify physical deficits which impact ability to perform ADLs (bathing, care of mouth/teeth, toileting, grooming, dressing, etc )  - Assess/evaluate cause of self-care deficits   - Assess range of motion  - Assess patient's mobility; develop plan if impaired  - Assess patient's need for assistive devices and provide as appropriate  - Encourage maximum independence but intervene and supervise when necessary  - Involve family in performance of ADLs  - Assess for home care needs following discharge   - Consider OT consult to assist with ADL evaluation and planning for discharge  - Provide patient education as appropriate  Outcome: Progressing  Goal: Maintains/Returns to pre admission functional level  Description: INTERVENTIONS:  - Perform BMAT or MOVE assessment daily    - Set and communicate daily mobility goal to care team and patient/family/caregiver  - Collaborate with rehabilitation services on mobility goals if consulted  - Out of bed for toileting  - Record patient progress and toleration of activity level   Outcome: Progressing     Problem: DISCHARGE PLANNING  Goal: Discharge to home or other facility with appropriate resources  Description: INTERVENTIONS:  - Identify barriers to discharge w/patient and caregiver  - Arrange for needed discharge resources and transportation as appropriate  - Identify discharge learning needs (meds, wound care, etc )  - Arrange for interpretive services to assist at discharge as needed  - Refer to Case Management Department for coordinating discharge planning if the patient needs post-hospital services based on physician/advanced practitioner order or complex needs related to functional status, cognitive ability, or social support system  Outcome: Progressing     Problem: Knowledge Deficit  Goal: Patient/family/caregiver demonstrates understanding of disease process, treatment plan, medications, and discharge instructions  Description: Complete learning assessment and assess knowledge base    Interventions:  - Provide teaching at level of understanding  - Provide teaching via preferred learning methods  Outcome: Progressing     Problem: METABOLIC, FLUID AND ELECTROLYTES - ADULT  Goal: Electrolytes maintained within normal limits  Description: INTERVENTIONS:  - Monitor labs and assess patient for signs and symptoms of electrolyte imbalances  - Administer electrolyte replacement as ordered  - Monitor response to electrolyte replacements, including repeat lab results as appropriate  - Instruct patient on fluid and nutrition as appropriate  Outcome: Progressing  Goal: Fluid balance maintained  Description: INTERVENTIONS:  - Monitor labs   - Monitor I/O and WT  - Instruct patient on fluid and nutrition as appropriate  - Assess for signs & symptoms of volume excess or deficit  Outcome: Progressing

## 2023-02-21 NOTE — PROGRESS NOTES
Patient tolerating liquids, T  bili normalized, lipase downtrending, no evidence of choledocholithiasis on imaging  Discussed with patient operative invention including laparoscopic cholecystectomy, patient and  are agreeable to this plan      Plan:  - OR for laparoscopic cholecystectomy today 2/21  -NPO/IVF

## 2023-02-21 NOTE — UTILIZATION REVIEW
Initial Clinical Review    Admission: Date/Time/Statement:   Admission Orders (From admission, onward)     Ordered        23 0536  INPATIENT ADMISSION  Once                      Orders Placed This Encounter   Procedures   • INPATIENT ADMISSION     Standing Status:   Standing     Number of Occurrences:   1     Order Specific Question:   Level of Care     Answer:   Med Surg [16]     Order Specific Question:   Estimated length of stay     Answer:   More than 2 Midnights     Order Specific Question:   Certification     Answer:   I certify that inpatient services are medically necessary for this patient for a duration of greater than two midnights  See H&P and MD Progress Notes for additional information about the patient's course of treatment  ED Arrival Information     Expected   -    Arrival   2023 01:15    Acuity   Urgent            Means of arrival   Walk-In    Escorted by   Spouse    Service   Hospitalist    Admission type   Emergency            Arrival complaint   Vomiting/Diarrhea           Chief Complaint   Patient presents with   • Vomiting     Patient has been vomiting and having diarrhea for 2 days now  She is also having chest pain and SOB  She is concerned because she is s/p  on        Initial Presentation: 27 y o  female who presented to [address] [place] [address] ED  Admitted Inpatient status dt Pancreatitis  PMHx: asthma, obesity  Presented w/ epigastric pain radiating to back with associated nausea, vomiting, and diarrhea, chest pain and shortness of breath, all symptoms started 2 days prior to arrival and had been progressively worsening  Patient denies history of gallstones, pancreatitis, EtOH abuse  On exam, abdominal tenderness noted to light palpation  Imaging revealed pancreatitis, see full report below  WBC 15 36, , , , T bili 3 16, Lipase 9922, D-dimer 2 98  Given 1 L bolus IVF, famotidine and zofran   Plan:  med surg telemetry, keep NPO, bowel rest, continue IVF, pain control prn, monitor labs, RUQ US, monitor fever, fu on blood and stool cxs/panel  Date: 2/21   Day 2:  GI Consult:   likely gallstone pancreatitis, leuks trending down defer abx, hepatic function improving today continue to trend  No MRCP needed  Continue IVF, pain and nausea control, advance diet as tolerated starting with clear liquids this am  Recommend general sx eval for consideration of CCY  Continue pepcid, colace and mirlax prn     2/21 General Sx Consult: Plan for cholecystectomy, continue above order  ED Triage Vitals   Temperature Pulse Respirations Blood Pressure SpO2   02/20/23 0146 02/20/23 0146 02/20/23 0146 02/20/23 0146 02/20/23 0146   98 5 °F (36 9 °C) 82 20 127/72 97 %      Temp Source Heart Rate Source Patient Position - Orthostatic VS BP Location FiO2 (%)   02/20/23 0146 02/20/23 0146 02/20/23 0146 02/20/23 0146 --   Oral Monitor Lying Right arm       Pain Score       02/20/23 0624       8          Wt Readings from Last 1 Encounters:   02/13/23 95 4 kg (210 lb 6 4 oz)     Additional Vital Signs:   Date/Time Temp Pulse Resp BP SpO2 O2 Device Patient Position - Orthostatic VS   02/20/23 2223 98 °F (36 7 °C) 64 19 107/57 98 % None (Room air) Sitting   02/20/23 1500 98 °F (36 7 °C) 54 Abnormal  18 111/56 97 % None (Room air) Lying   02/20/23 0330 -- -- -- -- 98 % -- --   02/20/23 0146 98 5 °F (36 9 °C) 82 20 127/72 97 % None (Room air) Lying     Pertinent Labs/Diagnostic Test Results:   2/20 EKG: SR    US right upper quadrant   Final Result by Lena Pickett MD (02/20 3416)      1  Cholelithiasis  2   Nonspecific mild gallbladder wall thickening with no other evidence of acute cholecystitis  3   No intrahepatic or extrahepatic bile duct dilatation        Workstation performed: [id number]         PE Study with CT Abdomen and Pelvis with contrast   ED Interpretation by Marcia Snider MD (02/20 2306)   FINDINGS:     CHEST     PULMONARY ARTERIAL TREE:  No pulmonary embolus is seen      LUNGS:  Lungs are clear  There is no tracheal or endobronchial lesion      PLEURA:  Unremarkable      HEART/AORTA:  Heart is unremarkable for patient's age  No thoracic aortic aneurysm      MEDIASTINUM AND FILIPPO:  Unremarkable      CHEST WALL AND LOWER NECK:  Unremarkable      ABDOMEN     LIVER/BILIARY TREE:  Unremarkable      GALLBLADDER:  There are gallstone(s) within the gallbladder, without pericholecystic inflammatory changes      SPLEEN:  Unremarkable      PANCREAS:  There is fluid and fat stranding about the pancreas which may be due to pancreatitis  No discrete peripancreatic collection  No main pancreatic duct dilatation  No evidence of necrosis      ADRENAL GLANDS:  Unremarkable      KIDNEYS/URETERS:  Unremarkable  No hydronephrosis      STOMACH AND BOWEL:  Unremarkable      APPENDIX:  A normal appendix was visualized      ABDOMINOPELVIC CAVITY:  Mild ascites  No pneumoperitoneum  No ly   mphadenopathy      VESSELS:  Unremarkable for patient's age      PELVIS     REPRODUCTIVE ORGANS:  There is a peripherally calcified fat-containing lesion in the left ovary measuring 1 7 cm (series 306, image 129) likely due to a dermoid cyst   The uterus is unremarkable  The right ovary is unremarkable      URINARY BLADDER:  Unremarkable      ABDOMINAL WALL/INGUINAL REGIONS:  Unremarkable      OSSEOUS STRUCTURES:  No acute fracture or destructive osseous lesion      IMPRESSION:     1  No evidence of pulmonary embolism  2   Fluid and fat stranding about the pancreas likely due to pancreatitis  No discrete peripancreatic collection  No evidence of pancreatic necrosis  Mild ascites  3   Cholelithiasis without evidence of cholecystitis    4   1 7 cm peripherally calcified fat-containing lesion in the left ovary likely due to a dermoid cyst      The study was marked in EPIC for immediate notification      Workstation performed: [id number]         Final Result by Stephanie Smith MD (02/20 0450)      1  No evidence of pulmonary embolism  2   Fluid and fat stranding about the pancreas likely due to pancreatitis  No discrete peripancreatic collection  No evidence of pancreatic necrosis  Mild ascites  3   Cholelithiasis without evidence of cholecystitis  4   1 7 cm peripherally calcified fat-containing lesion in the left ovary likely due to a dermoid cyst       The study was marked in EPIC for immediate notification  Workstation performed: [id number]         XR chest 1 view portable   ED Interpretation by Oretha Galeazzi, MD (02/20 0304)   No acute disease read by me  Final Result by Pollo Sullivan MD (02/20 1038)      No acute cardiopulmonary disease                    Workstation performed: [id number]           Results from last 7 days   Lab Units 02/20/23  0638   SARS-COV-2  Negative     Results from last 7 days   Lab Units 02/21/23  0557 02/20/23  0217   WBC Thousand/uL 8 94 15 36*   HEMOGLOBIN g/dL 11 3* 13 8   HEMATOCRIT % 35 2 42 5   PLATELETS Thousands/uL 329 435*   NEUTROS ABS Thousands/µL 6 15 13 30*         Results from last 7 days   Lab Units 02/21/23  0557 02/20/23  0217   SODIUM mmol/L 138 139   POTASSIUM mmol/L 3 7 3 9   CHLORIDE mmol/L 110* 106   CO2 mmol/L 20* 25   ANION GAP mmol/L 8 8   BUN mg/dL 15 13   CREATININE mg/dL 0 72 0 83   EGFR ml/min/1 73sq m 109 92   CALCIUM mg/dL 8 2* 9 4     Results from last 7 days   Lab Units 02/21/23  0557 02/20/23  0217   AST U/L 43* 214*   ALT U/L 106* 222*   ALK PHOS U/L 139* 190*   TOTAL PROTEIN g/dL 5 9* 7 3   ALBUMIN g/dL 3 4* 4 3   TOTAL BILIRUBIN mg/dL 0 92 3 16*         Results from last 7 days   Lab Units 02/21/23  0557 02/20/23  0217   GLUCOSE RANDOM mg/dL 70 113     Results from last 7 days   Lab Units 02/20/23  0217   CK TOTAL U/L 98     Results from last 7 days   Lab Units 02/20/23  0506 02/20/23  0217   HS TNI 0HR ng/L  --  3   HS TNI 2HR ng/L 2  --    HSTNI D2 ng/L -1  --      Results from last 7 days Lab Units 02/20/23  0217   D-DIMER QUANTITATIVE ug/ml FEU 2 98*     Results from last 7 days   Lab Units 02/20/23  0217   PROTIME seconds 12 8   INR  0 94   PTT seconds 24     Results from last 7 days   Lab Units 02/20/23  0217   LACTIC ACID mmol/L 0 7     Results from last 7 days   Lab Units 02/20/23  0217   BNP pg/mL 62     Results from last 7 days   Lab Units 02/20/23  0217   LIPASE u/L 4,199*     Results from last 7 days   Lab Units 02/20/23  0506   CLARITY UA  Clear   COLOR UA  Yellow   SPEC GRAV UA  1 034*   PH UA  6 0   GLUCOSE UA mg/dl Negative   KETONES UA mg/dl Negative   BLOOD UA  Trace*   PROTEIN UA mg/dl Negative   NITRITE UA  Negative   BILIRUBIN UA  Small*   UROBILINOGEN UA (BE) mg/dl <2 0   LEUKOCYTES UA  Negative   WBC UA /hpf 1-2   RBC UA /hpf 1-2   BACTERIA UA /hpf None Seen   EPITHELIAL CELLS WET PREP /hpf Occasional     Results from last 7 days   Lab Units 02/20/23  1733   INFLUENZA A PCR  Negative   INFLUENZA B PCR  Negative   RSV PCR  Negative     Results from last 7 days   Lab Units 02/20/23  0217   BLOOD CULTURE  Received in Microbiology Lab  Culture in Progress  Received in Microbiology Lab  Culture in Progress       ED Treatment:   Medication Administration from 02/20/2023 0115 to 02/20/2023 0386       Date/Time Order Dose Route Action     02/20/2023 0217 EST sodium chloride 0 9 % bolus 1,000 mL 1,000 mL Intravenous New Bag     02/20/2023 0418 EST sodium chloride 0 9 % infusion 150 mL/hr Intravenous New Bag     02/20/2023 0216 EST ondansetron (ZOFRAN) injection 4 mg 4 mg Intravenous Given     02/20/2023 0217 EST Famotidine (PF) (PEPCID) injection 20 mg 20 mg Intravenous Given     02/20/2023 0333 EST iohexol (OMNIPAQUE) 350 MG/ML injection (SINGLE-DOSE) 100 mL 100 mL Intravenous Given        Past Medical History:   Diagnosis Date   • Allergic    • Asthma    • Fibroadenoma of left breast      Present on Admission:  **None**      Admitting Diagnosis: Gallstone [K80 20]  Pancreatitis [K85 90]  Vomiting [R11 10]  Chest pain [R07 9]  Elevated LFTs [R79 89]  Nausea vomiting and diarrhea [R11 2, R19 7]  Age/Sex: 27 y o  female  Admission Orders:  Scheduled Medications:  azelastine, 1 spray, Each Nare, BID  docusate sodium, 100 mg, Oral, BID  fluticasone, 1 spray, Each Nare, BID  fluticasone-vilanterol, 1 puff, Inhalation, Daily      Continuous IV Infusions:  sodium chloride, 200 mL/hr, Intravenous, Continuous      PRN Meds:  acetaminophen, 650 mg, Oral, Q6H PRN  albuterol, 2 puff, Inhalation, Q4H PRN  HYDROmorphone, 0 2 mg, Intravenous, Q3H PRN  ondansetron, 4 mg, Intravenous, Q6H PRN  oxyCODONE, 10 mg, Oral, Q4H PRN x1 thus far  oxyCODONE, 5 mg, Oral, Q4H PRN  polyethylene glycol, 17 g, Oral, Daily PRN      Network Utilization Review Department  ATTENTION: Please call with any questions or concerns to [phone] and carefully listen to the prompts so that you are directed to the right person  All voicemails are confidential   Roshan Oar all requests for admission clinical reviews, approved or denied determinations and any other requests to dedicated fax number below belonging to the campus where the patient is receiving treatment   List of dedicated fax numbers for the Facilities:  [address] DENIALS (Administrative/Medical Necessity) [phone]   [address] (Maternity/NICU/Pediatrics) [phone]   [address] Gallardo [phone]   [facility]  [phone]   [address] [facility] [address] [place] [place] [address] [address] [place] [address] [phone]   [address] [place] [address] [facility] 134 1102 [facility] [place] [phone]

## 2023-02-21 NOTE — ASSESSMENT & PLAN NOTE
Likely secondary to abdominal pain  D-dimer 2 98  CTA- No pulmonary embolism  97% on RA, afebrile  COVID/flu/RSV-Negative  CxR:  No acute cardiopulmonary disease  Plan-  1  Cardiopulmonary monitoring   2  Pain management for abdominal pain   3   COVID/flu/RSV-pending

## 2023-02-21 NOTE — ASSESSMENT & PLAN NOTE
DDX including but not limited to: chest wall pain, costochondritis, pleurisy, pericarditis, myocarditis, PTX, pneumonia, GI etiology; doubt ACS or MI or dissection or PE or rhabdomyolysis  Troponin- 3  EKG- Sinus arrhythmia, no ST T wave changes  Complain of epigastric pain consistent with GERD  Plan:  · Pepcid 20 mg IV BID

## 2023-02-21 NOTE — PROGRESS NOTES
[facility]  Progress Note Braulioalex Lrs OrCarly , 27 y o  female MRN: [id number]  Unit/Bed#: W -01 Encounter: [id number]  Primary Care Provider: Delvis Matthews MD   Date and time admitted to hospital: 2023  1:40 AM    * Pancreatitis  Assessment & Plan  Complaining of epigastric abdominal pain with radiation to the back  Associated with nausea, vomiting, diarrhea onset 2 days ago and has progressively worsened  · No history of EtOH abuse, gallstones  S/P  without complications 1 month ago  · Lipase- 9922  · CTA AP- Fluid and fat stranding about the pancreas likely due to pancreatitis, No discrete peripancreatic collection, No evidence of pancreatic necrosis, mild ascites, cholelithiasis without evidence of cholecystitis  · RUQ US: Cholelithiasis, mild GB wall thickening without other evidence of acute cholecystitis  No intrahepatic or extrahepatic bile duct dilatation  · Etiology: Likely gallstone pancreatitis  · Labs: Leukocytosis improves, down trended hepatic function levels  · GI Consulted, Appreciciate recommendations  · General surgery consulted, input appreciated, triglycerides WNL  Plan:  · Diet advanced to clear liquids  · IV Fluids  · Pain management  · Plan for OR during hospital stay for cholecystectomy time TBD     Elevated LFTs  Assessment & Plan  Elevated LFTs  POA , ,  , T  Bili- 3 16  DDx including but not limited to: Hepatitis, pancreatitis, cholecystitis, biliary colic, choledocholithiasis  Consider possible obstructing stone not visualized on CT abdomen  RUQ US: see above  Improving: Liver function labs trending downwarrded   Plan:  - Monitor CMP    Leukocytosis  Assessment & Plan  Recent Labs     23  0217 23  0557   WBC 15 36* 8 94   POA leukocytosis 15 36  Urine dipstick shows negative for nitrites, leukocytes, bacteria, glucose, protein, ketones, positive for red blood cells, urobilinogen    Likely reactive in the setting of pancreatitis  Low suspicion for infection  Afebrile  Plan:  · Trend Fever and WBC curve  · Follow up BCx    Chest pain  Assessment & Plan  DDX including but not limited to: chest wall pain, costochondritis, pleurisy, pericarditis, myocarditis, PTX, pneumonia, GI etiology; doubt ACS or MI or dissection or PE or rhabdomyolysis  Troponin- 3  EKG- Sinus arrhythmia, no ST T wave changes  Complain of epigastric pain consistent with GERD  Plan:  · Pepcid 20 mg IV BID      SOB (shortness of breath)-resolved as of 2023  Assessment & Plan  Likely secondary to abdominal pain  D-dimer 2 98  CTA- No pulmonary embolism  97% on RA, afebrile  COVID/flu/RSV-Negative  CxR:  No acute cardiopulmonary disease  Plan-  7  Cardiopulmonary monitoring   8  Pain management for abdominal pain   9  COVID/flu/RSV-pending       VTE Pharmacologic Prophylaxis: VTE Score: 1 Low Risk (Score 0-2) - Encourage Ambulation  Patient Centered Rounds: I performed bedside rounds with nursing staff today  Discussions with Specialists or Other Care Team Provider: GI, Gen Surg   Education and Discussions with Family / Patient: Updated  (significant other) at bedside  Current Length of Stay: 1 day(s)  Current Patient Status: Inpatient   Discharge Plan: Anticipate discharge in 24-48 hrs to home '  Code Status: Level 1 - Full Code    Subjective:   Patient seen and examined at bedside  Patient resting comfortably in bed upon entering the room  Reports lower abdominal pain now resolved but described ongoing intermittent epigastric pain consistent with acid reflux  Patient reports she has not had a bowel movement since being admitted reporting ongoing difficulty having normal bowel movement since giving birth 1 month ago  Patient denies any nausea or episodes of vomiting      Objective:   Vitals: Temp (24hrs), Av °F (36 7 °C), Min:98 °F (36 7 °C), Max:98 °F (36 7 °C)    Temp:  [98 °F (36 7 °C)] 98 °F (36 7 °C)  HR:  [54-64] 64  Resp:  [18-19] 19  BP: (107-111)/(56-57) 107/57  SpO2:  [97 %-98 %] 98 %  There is no height or weight on file to calculate BMI  Input and Output Summary (last 24 hours): Intake/Output Summary (Last 24 hours) at 2/21/2023 1328  Last data filed at 2/21/2023 1207  Gross per 24 hour   Intake 3252 33 ml   Output --   Net 3252 33 ml       Physical Exam:   Physical Exam  Vitals reviewed  Constitutional:       General: She is not in acute distress  Appearance: She is normal weight  She is not ill-appearing or toxic-appearing  HENT:      Head: Normocephalic and atraumatic  Mouth/Throat:      Mouth: Mucous membranes are moist    Cardiovascular:      Rate and Rhythm: Normal rate and regular rhythm  Pulmonary:      Effort: Pulmonary effort is normal  No respiratory distress  Abdominal:      General: There is no distension  Palpations: Abdomen is soft  Tenderness: There is abdominal tenderness (mild epigastric tenderness)  There is no guarding or rebound  Negative signs include Vaz's sign  Musculoskeletal:         General: No swelling or tenderness  Skin:     General: Skin is dry  Neurological:      General: No focal deficit present  Mental Status: She is alert and oriented to person, place, and time  Mental status is at baseline     Psychiatric:         Mood and Affect: Mood normal          Behavior: Behavior normal          Additional Data:   Labs:  Results from last 7 days   Lab Units 02/21/23  0557   WBC Thousand/uL 8 94   HEMOGLOBIN g/dL 11 3*   HEMATOCRIT % 35 2   PLATELETS Thousands/uL 329   NEUTROS PCT % 69   LYMPHS PCT % 22   MONOS PCT % 6   EOS PCT % 2     Results from last 7 days   Lab Units 02/21/23  0557   SODIUM mmol/L 138   POTASSIUM mmol/L 3 7   CHLORIDE mmol/L 110*   CO2 mmol/L 20*   BUN mg/dL 15   CREATININE mg/dL 0 72   ANION GAP mmol/L 8   CALCIUM mg/dL 8 2*   ALBUMIN g/dL 3 4*   TOTAL BILIRUBIN mg/dL 0 92   ALK PHOS U/L 139*   ALT U/L 106*   AST U/L 43* GLUCOSE RANDOM mg/dL 70     Results from last 7 days   Lab Units 02/20/23  0217   INR  0 94             Results from last 7 days   Lab Units 02/20/23  0217   LACTIC ACID mmol/L 0 7       Lines/Drains:  Invasive Devices     Peripheral Intravenous Line  Duration           Peripheral IV 02/20/23 Left Antecubital 1 day                      Imaging: Reviewed radiology reports from this admission including: ultrasound(s)    Recent Cultures (last 7 days):   Results from last 7 days   Lab Units 02/20/23 0217   BLOOD CULTURE  No Growth at 24 hrs  No Growth at 24 hrs  Last 24 Hours Medication List:   Current Facility-Administered Medications   Medication Dose Route Frequency Provider Last Rate   • acetaminophen  650 mg Oral Q6H PRN Mikala Kumari MD     • albuterol  2 puff Inhalation Q4H PRN Mikala Kumari MD     • azelastine  1 spray Each Nare BID Mikala Kumari MD     • docusate sodium  100 mg Oral BID Mikala Kumari MD     • famotidine  20 mg Intravenous Q12H [facility] Manav Fortune MD     • fluticasone  1 spray Each Nare BID Mikala Kumari MD     • fluticasone-vilanterol  1 puff Inhalation Daily Mikala Kumari MD     • HYDROmorphone  0 2 mg Intravenous Q3H PRN Mikala Kumari MD     • multi-electrolyte  200 mL/hr Intravenous Continuous [place]  mL/hr (02/21/23 1207)   • ondansetron  4 mg Intravenous Q6H PRN Mikala Kumari MD     • oxyCODONE  10 mg Oral Q4H PRN Mikala Kumari MD     • oxyCODONE  5 mg Oral Q4H PRN Mikala Kumari MD     • polyethylene glycol  17 g Oral Daily PRN Mikala Kumari MD          Today, Patient Was Seen By: Dorothea Wolff DO  **Please Note: This note may have been constructed using a voice recognition system  **

## 2023-02-21 NOTE — DISCHARGE INSTR - AVS FIRST PAGE
Please call the office when you leave to schedule an appointment for 2 weeks  Please call [phone]                      [address], [place], [address]  [address] of [address] between [facility] and [facility]  Activity:    May lift 10 lb as many times as desired the 1st week,       20 lb in 2 weeks,       30 lb in 3 weeks  Walking is encouraged  Normal daily activities including climbing steps are okay  Do not engage in strenuous activity ( sit-ups or crutches) or contact sports for 4-6 weeks post-operatively    Return to Work:   Okay to return to work when you feel well if you desire  Diet:   You may return to your normal healthy diet  Wound Care: Your wound is closed with dissolvable stitches and glue  It is okay to shower  Wash incision gently with soap and water and pat dry  Do not soak incisions in bath water or swim for two weeks  Do not apply any creams or ointments  Pain Medication:   Please take as directed if needed  May use tylenol in addition   Recall, the pain medicine and anesthesia is associated with constipation  No driving while taking narcotic pain medications  Other: It is normal to developed a “healing ridge” / firm incision after surgery  This is your body making scar tissue  It is a good sign  Constipation is very common after general anesthesia  Please use milk of magnesia as needed in order to help prevent constipation  It is normal to get bruising after surgery  If you have questions after discharge please call the office      If you have increased pain, fever >101 5, increased drainage, redness or a bad smell at your surgery site, please call us immediately or come directly to the Emergency Room

## 2023-02-21 NOTE — ASSESSMENT & PLAN NOTE
Recent Labs     02/20/23  0217 02/21/23  0557   WBC 15 36* 8 94   POA leukocytosis 15 36  Urine dipstick shows negative for nitrites, leukocytes, bacteria, glucose, protein, ketones, positive for red blood cells, urobilinogen    Likely reactive in the setting of pancreatitis  Low suspicion for infection  Afebrile  Plan:  · Trend Fever and WBC curve  · Follow up BCx

## 2023-02-21 NOTE — CONSULTS
Consultation - [facility]  Floy Shoulders 27 y o  female MRN: [id number]  Unit/Bed#: W -84 Encounter: [id number]        Inpatient consult to gastroenterology  Consult performed by: Damien Machado MD  Consult ordered by: Trena Fernandez MD          Reason for Consult / Principal Problem: pancreatitis    ASSESSMENT and PLAN:    Principal Problem:    Pancreatitis  Active Problems:    Leukocytosis    Elevated LFTs    Chest pain    SOB (shortness of breath)    #1 Pancreatitis  -CT CAP shows pancreatitis  Lipase 9922  abdominal pain present      -Triglycerides normal  No EtOH abuse  -RUQ US shows cholelithiasis  Mild GB wall thickening without other signs of cholecystitis  No CBD dilation  No evidence choledocholithiasis/cholagnitis     -likely gallstone pancreatitis    -leukocytosis down-trending, afebrile, no pancreatic collection  Defer abx     -improvement in hepatic function panel today, continue to trend    -given improvement in labs/pain and no bile duct dilation, no MRCP needed    -continue IV resuscitation with NS    -continue with pain/nausea control    -progress diet as tolerated  Changed to clear liquids this morning  Can progress to full liquids if tolerating     -recommend general surgery eval for consideration of CCY, consult placed, appreciate recommendations    -patient interested in considering non-surgical alternatives to CCY      #2 Abdominal Pain  -originally epigastric w/ radiation to back  Now substernal/reflux-like, similar to prior episode of PUD    -likely related to pancreatitis with possible component of PUD recurrence?    -will trial Pepcid 20 mg IV BID      #3 Constipation  -has been having constipation intra- and post-partum    -used Miralax during pregnancy   Using Colace as needed for past month    -will continue Colace and prn miralax    -------------------------------------------------------------------------------------------------------------------    HPI:     27year old female with PMH obesity, PUD, asthma, and 1 month s/p  who presented to ED with epigastric abdominal pain radiating to the back associated with nausea, vomiting, and diarrhea which started 2 days prior to admission  No history ETOH abuse, no prior gallstones, triglycerides normal     In ED, VSS  CBC showed leukocytosis 15  CMP showed elevated , , , T  bili 3 16  Lipase 9922  D-dimer 2 98  CTA AP showed no evidence of pulmonary embolism, Fluid and fat stranding about the pancreas likely due to pancreatitis, No discrete peripancreatic collection, No evidence of pancreatic necrosis, Mild ascites, Cholelithiasis without evidence of cholecystitis  RUQ US showed cholelithiasis with nonspecific mild gallbladder wall thickening with no other evidence of acute cholecystitis  ED gave NS 1L bolus, started NS infusion, famotidine, Zofran, NPO  Patient admitted for pancreatitis  GI consulted  Pain and nausea are improved this morning  States abdominal pain has progressed from epigastric w/ radiation to more substernal, feeling like prior time she had ulcer  Prior EGD performed in 2020 at [facility] for intractable nausea/vomitting  Found mild patchy erythema in stomach, normal duodenum, and small hiatal hernia  Biopsy negative for H  Pylori  Diagnosed with peptic ulcer disease treated with course of Nexium with resolution  No prior colonoscopy  Has been experiencing constipation intra-partum and post-partum  Was taking miralax during pregnancy and Colace as needed for last month since   Last BM was prior to hospital admission was normal in consistency and color  REVIEW OF SYSTEMS:    CONSTITUTIONAL: Denies any fever, chills, or rigors  Good appetite, and no recent weight loss  HEENT: No earache or tinnitus  Denies hearing loss or visual disturbances  CARDIOVASCULAR: No chest pain or palpitations  RESPIRATORY: Denies any cough, hemoptysis, shortness of breath or dyspnea on exertion  GASTROINTESTINAL: As noted in the History of Present Illness  GENITOURINARY: No problems with urination  Denies any hematuria or dysuria  NEUROLOGIC: No dizziness or vertigo, denies headaches  MUSCULOSKELETAL: Denies any muscle or joint pain  SKIN: Denies skin rashes or itching  ENDOCRINE: Denies excessive thirst  Denies intolerance to heat or cold  PSYCHOSOCIAL: Denies depression or anxiety  Denies any recent memory loss  Historical Information   Past Medical History:   Diagnosis Date   • Allergic    • Asthma    • Fibroadenoma of left breast      Past Surgical History:   Procedure Laterality Date   • CYST REMOVAL N/A     low back   • NE  DELIVERY ONLY N/A 2023    Procedure:  SECTION ();   Surgeon: Ag Camacho MD;  Location: [facility] ;  Service: Obstetrics   • WISDOM TOOTH EXTRACTION       Social History   Social History     Substance and Sexual Activity   Alcohol Use Not Currently    Comment: rarely     Social History     Substance and Sexual Activity   Drug Use Not Currently    Comment: Kiko Pretty - 1 year ago     Social History     Tobacco Use   Smoking Status Never   Smokeless Tobacco Never     Family History   Problem Relation Age of Onset   • Hypertension Mother    • Arthritis Mother    • Hypothyroidism Mother    • Hypertension Father    • Hypothyroidism Sister    • Hypothyroidism Sister    • Arthritis Maternal Grandmother    • Cancer Maternal Grandfather    • Stroke Paternal Grandmother    • No Known Problems Paternal Grandfather        Meds/Allergies     Medications Prior to Admission   Medication   • albuterol (PROVENTIL HFA,VENTOLIN HFA) 90 mcg/act inhaler   • Azelastine HCl 137 MCG/SPRAY SOLN   • budesonide (RINOCORT AQUA) 32 MCG/ACT nasal spray   • docusate sodium (COLACE) 100 mg capsule   • Fluticasone-Salmeterol (Advair Diskus) 250-50 mcg/dose inhaler   • Polyethylene Glycol 3350 (MIRALAX PO)   • Prenatal MV-Min-Fe Fum-FA-DHA (PRENATAL+DHA PO)   • Blood Glucose Monitoring Suppl (OneTouch Verio Flex System) w/Device KIT     Current Facility-Administered Medications   Medication Dose Route Frequency   • acetaminophen (TYLENOL) tablet 650 mg  650 mg Oral Q6H PRN   • albuterol (PROVENTIL HFA,VENTOLIN HFA) inhaler 2 puff  2 puff Inhalation Q4H PRN   • azelastine (ASTELIN) 0 1 % nasal spray 1 spray  1 spray Each Nare BID   • docusate sodium (COLACE) capsule 100 mg  100 mg Oral BID   • Famotidine (PF) (PEPCID) injection 20 mg  20 mg Intravenous Q12H FIDELIA   • fluticasone (FLONASE) 50 mcg/act nasal spray 1 spray  1 spray Each Nare BID   • fluticasone-vilanterol 200-25 mcg/actuation 1 puff  1 puff Inhalation Daily   • HYDROmorphone HCl (DILAUDID) injection 0 2 mg  0 2 mg Intravenous Q3H PRN   • ondansetron (ZOFRAN) injection 4 mg  4 mg Intravenous Q6H PRN   • oxyCODONE (ROXICODONE) immediate release tablet 10 mg  10 mg Oral Q4H PRN   • oxyCODONE (ROXICODONE) IR tablet 5 mg  5 mg Oral Q4H PRN   • polyethylene glycol (MIRALAX) packet 17 g  17 g Oral Daily PRN   • sodium chloride 0 9 % infusion  200 mL/hr Intravenous Continuous       Allergies   Allergen Reactions   • Aspirin Swelling     Swollen eyes   • Shellfish Allergy - Food Allergy Hives, Itching and Swelling           Objective     Blood pressure 107/57, pulse 64, temperature 98 °F (36 7 °C), temperature source Oral, resp  rate 19, SpO2 98 %, currently breastfeeding        Intake/Output Summary (Last 24 hours) at 2/21/2023 1018  Last data filed at 2/21/2023 0913  Gross per 24 hour   Intake 3529 ml   Output --   Net 3529 ml         PHYSICAL EXAM:      General Appearance:   Alert, cooperative, no distress, appears stated age    HEENT:   Normocephalic, atraumatic, anicteric, no oropharyngeal thrush present      Neck:  Supple, symmetrical, trachea midline, no adenopathy;    thyroid: no enlargement/tenderness/nodules; no carotid  bruit or JVD    Lungs:   Clear to auscultation bilaterally; no rales, rhonchi or wheezing; respirations unlabored    Heart[de-identified]   S1 and S2 normal; regular rate and rhythm; no murmur, rub, or gallop  Abdomen:   Soft, mildly tender to upper quadrants and epigastric area , non-distended; normal bowel sounds; no masses, no organomegaly    Genitalia:   Deferred    Rectal:   Deferred    Extremities:  No cyanosis, clubbing or edema    Pulses:  2+ and symmetric all extremities    Skin:  Skin color, texture, turgor normal, no rashes or lesions    Lymph nodes:  No palpable cervical, axillary or inguinal lymphadenopathy        Lab Results:   Results from last 7 days   Lab Units 02/21/23  0557   WBC Thousand/uL 8 94   HEMOGLOBIN g/dL 11 3*   HEMATOCRIT % 35 2   PLATELETS Thousands/uL 329   NEUTROS PCT % 69   LYMPHS PCT % 22   MONOS PCT % 6   EOS PCT % 2     Results from last 7 days   Lab Units 02/21/23  0557   POTASSIUM mmol/L 3 7   CHLORIDE mmol/L 110*   CO2 mmol/L 20*   BUN mg/dL 15   CREATININE mg/dL 0 72   CALCIUM mg/dL 8 2*   ALK PHOS U/L 139*   ALT U/L 106*   AST U/L 43*     Results from last 7 days   Lab Units 02/20/23  0217   INR  0 94     Results from last 7 days   Lab Units 02/20/23  0217   LIPASE u/L 1,264*       Imaging Studies: I have personally reviewed pertinent imaging studies  XR chest 1 view portable    Result Date: 2/20/2023  Impression: No acute cardiopulmonary disease  Workstation performed: [id number]     PE Study with CT Abdomen and Pelvis with contrast    Result Date: 2/20/2023  Impression: 1  No evidence of pulmonary embolism  2   Fluid and fat stranding about the pancreas likely due to pancreatitis  No discrete peripancreatic collection  No evidence of pancreatic necrosis  Mild ascites  3   Cholelithiasis without evidence of cholecystitis   4   1 7 cm peripherally calcified fat-containing lesion in the left ovary likely due to a dermoid cyst  The study was marked in EPIC for immediate notification  Workstation performed: [id number]     US right upper quadrant    Result Date: 2/20/2023  Impression: 1  Cholelithiasis  2   Nonspecific mild gallbladder wall thickening with no other evidence of acute cholecystitis  3   No intrahepatic or extrahepatic bile duct dilatation  Workstation performed: [id number]           Patient was seen and examined by Dr Tiffany Simpson  All hung medical decisions were made by Dr Tiffany Simpson  Thank you for allowing us to participate in the care of this present patient  We will follow-up with you closely

## 2023-02-21 NOTE — ASSESSMENT & PLAN NOTE
Elevated LFTs  POA , ,  , T  Bili- 3 16  DDx including but not limited to: Hepatitis, pancreatitis, cholecystitis, biliary colic, choledocholithiasis  Consider possible obstructing stone not visualized on CT abdomen  RUQ US: see above  Improving: Liver function labs trending downwarrded   Plan:  - Monitor CMP

## 2023-02-21 NOTE — ASSESSMENT & PLAN NOTE
Complaining of epigastric abdominal pain with radiation to the back  Associated with nausea, vomiting, diarrhea onset 2 days ago and has progressively worsened  · No history of EtOH abuse, gallstones  S/P  without complications 1 month ago  · Lipase- 9922  · CTA AP- Fluid and fat stranding about the pancreas likely due to pancreatitis, No discrete peripancreatic collection, No evidence of pancreatic necrosis, mild ascites, cholelithiasis without evidence of cholecystitis  · RUQ US: Cholelithiasis, mild GB wall thickening without other evidence of acute cholecystitis   No intrahepatic or extrahepatic bile duct dilatation  · Etiology: Likely gallstone pancreatitis  · Labs: Leukocytosis improves, down trended hepatic function levels  · GI Consulted, Appreciciate recommendations  · General surgery consulted, input appreciated, triglycerides WNL  Plan:  · Diet advanced to clear liquids  · IV Fluids  · Pain management  · Plan for OR during hospital stay for cholecystectomy time TBD

## 2023-02-21 NOTE — CONSULTS
Consultation -General Surgery  Felix Alcala 27 y o  female MRN: [id number]  Unit/Bed#: W -01 Encounter: [id number]    ASSESSMENT:  26 yo female with pancreatitis, likely biliary in nature  Afebrile, VSS  Leukocytosis normalized: 8 94 from 15 36  Tbili normalized 0 92 from 3 16  Lipase 9922 yesterday       Plan:  -Diet as tolerated, Full Liquids today per GI  -Will plan for cholecystectomy during this admission, timing TBD   -IVF  -Pain control PRN  -Antiemetics PRN  -DVT ppx  -Discussed with  Dr Wilcox      Reason for Consult / Principal Problem:    HPI: Felix Alcala is a 27y o  year old female who is 1 month postpartum from a , history of GERD and asthma presents with Pancreatitis  Reports 2 days of vomiting and epigastric pain with radiation to her back  She reports emesis was NBNB, just undigested food  Her abdominal pain is now improving since her admission  She denies fever or chills  She denies any prior episodes of of abdominal pain associated with eating or known gallstones  She seldomly drinks alcohol, reports 1-2 drinks a month prior to her pregnancy  She does report having multiple bowel movements which were solid but frequent  She was taking colace and Miralax for constipation and hemorrhoids during her pregnancy  She denies any further bowel movements  Review of Systems   Constitutional: Positive for appetite change  Negative for activity change, chills and fever  Cardiovascular: Negative for chest pain  Gastrointestinal: Positive for abdominal pain, nausea and vomiting  Negative for abdominal distention, anal bleeding and blood in stool  Neurological: Negative for weakness         Historical Information   Past Medical History:   Diagnosis Date   • Allergic    • Asthma    • Fibroadenoma of left breast      Past Surgical History:   Procedure Laterality Date   • CYST REMOVAL N/A     low back   • ME  DELIVERY ONLY N/A 2023    Procedure:  SECTION ();   Surgeon: Oscar Deras MD;  Location: [facility] ;  Service: Obstetrics   • WISDOM TOOTH EXTRACTION       Social History   Social History     Substance and Sexual Activity   Alcohol Use Not Currently    Comment: rarely     Social History     Substance and Sexual Activity   Drug Use Not Currently    Comment: tami - 1 year ago     Social History     Tobacco Use   Smoking Status Never   Smokeless Tobacco Never     Family History   Problem Relation Age of Onset   • Hypertension Mother    • Arthritis Mother    • Hypothyroidism Mother    • Hypertension Father    • Hypothyroidism Sister    • Hypothyroidism Sister    • Arthritis Maternal Grandmother    • Cancer Maternal Grandfather    • Stroke Paternal Grandmother    • No Known Problems Paternal Grandfather        Meds/Allergies     Medications Prior to Admission   Medication   • albuterol (PROVENTIL HFA,VENTOLIN HFA) 90 mcg/act inhaler   • Azelastine HCl 137 MCG/SPRAY SOLN   • budesonide (RINOCORT AQUA) 32 MCG/ACT nasal spray   • docusate sodium (COLACE) 100 mg capsule   • Fluticasone-Salmeterol (Advair Diskus) 250-50 mcg/dose inhaler   • Polyethylene Glycol 3350 (MIRALAX PO)   • Prenatal MV-Min-Fe Fum-FA-DHA (PRENATAL+DHA PO)   • Blood Glucose Monitoring Suppl (OneTouch Verio Flex System) w/Device KIT     Current Facility-Administered Medications   Medication Dose Route Frequency   • acetaminophen (TYLENOL) tablet 650 mg  650 mg Oral Q6H PRN   • albuterol (PROVENTIL HFA,VENTOLIN HFA) inhaler 2 puff  2 puff Inhalation Q4H PRN   • azelastine (ASTELIN) 0 1 % nasal spray 1 spray  1 spray Each Nare BID   • docusate sodium (COLACE) capsule 100 mg  100 mg Oral BID   • Famotidine (PF) (PEPCID) injection 20 mg  20 mg Intravenous Q12H FIDELIA   • fluticasone (FLONASE) 50 mcg/act nasal spray 1 spray  1 spray Each Nare BID   • fluticasone-vilanterol 200-25 mcg/actuation 1 puff  1 puff Inhalation Daily   • HYDROmorphone HCl (DILAUDID) injection 0 2 mg 0 2 mg Intravenous Q3H PRN   • ondansetron (ZOFRAN) injection 4 mg  4 mg Intravenous Q6H PRN   • oxyCODONE (ROXICODONE) immediate release tablet 10 mg  10 mg Oral Q4H PRN   • oxyCODONE (ROXICODONE) IR tablet 5 mg  5 mg Oral Q4H PRN   • polyethylene glycol (MIRALAX) packet 17 g  17 g Oral Daily PRN   • sodium chloride 0 9 % infusion  200 mL/hr Intravenous Continuous       Allergies   Allergen Reactions   • Aspirin Swelling     Swollen eyes   • Shellfish Allergy - Food Allergy Hives, Itching and Swelling       Objective     Blood pressure 107/57, pulse 64, temperature 98 °F (36 7 °C), temperature source Oral, resp  rate 19, SpO2 98 %, currently breastfeeding  Intake/Output Summary (Last 24 hours) at 2/21/2023 1010  Last data filed at 2/21/2023 0913  Gross per 24 hour   Intake 3529 ml   Output --   Net 3529 ml       PHYSICAL EXAM  Physical Exam  Vitals reviewed  Constitutional:       General: She is not in acute distress  Appearance: She is normal weight  She is not ill-appearing or toxic-appearing  Cardiovascular:      Rate and Rhythm: Normal rate  Pulmonary:      Effort: Pulmonary effort is normal  No respiratory distress  Abdominal:      General: There is no distension  Palpations: Abdomen is soft  Tenderness: There is no abdominal tenderness  There is no guarding or rebound  Negative signs include Vaz's sign  Skin:     General: Skin is dry  Neurological:      General: No focal deficit present  Mental Status: She is alert     Psychiatric:         Mood and Affect: Mood normal          Lab Results:   CBC:   Lab Results   Component Value Date    WBC 8 94 02/21/2023    HGB 11 3 (L) 02/21/2023    HCT 35 2 02/21/2023    MCV 90 02/21/2023     02/21/2023    MCH 28 8 02/21/2023    MCHC 32 1 02/21/2023    RDW 13 7 02/21/2023    MPV 8 5 (L) 02/21/2023    NRBC 0 02/21/2023   , CMP:   Lab Results   Component Value Date    SODIUM 138 02/21/2023    K 3 7 02/21/2023     (H) 02/21/2023    CO2 20 (L) 02/21/2023    BUN 15 02/21/2023    CREATININE 0 72 02/21/2023    CALCIUM 8 2 (L) 02/21/2023    AST 43 (H) 02/21/2023     (H) 02/21/2023    ALKPHOS 139 (H) 02/21/2023    EGFR 109 02/21/2023     Imaging: I have personally reviewed pertinent reports  2/20 CTA CAP:  1   No evidence of pulmonary embolism  2   Fluid and fat stranding about the pancreas likely due to pancreatitis   No discrete peripancreatic collection   No evidence of pancreatic necrosis  Mild ascites  3   Cholelithiasis without evidence of cholecystitis  4   1 7 cm peripherally calcified fat-containing lesion in the left ovary likely due to a dermoid cyst      2/20 RUQ u/s:  1  Cholelithiasis      2  Nonspecific mild gallbladder wall thickening with no other evidence of acute cholecystitis      3  No intrahepatic or extrahepatic bile duct dilatation  EKG, Pathology, and Other Studies: I have personally reviewed pertinent reports  Counseling / Coordination of Care  Total time spent today  20 minutes  Greater than 50% of total time was spent with the patient and / or family counseling and / or coordination of care

## 2023-02-22 PROBLEM — R00.1 BRADYCARDIA: Status: ACTIVE | Noted: 2023-02-22

## 2023-02-22 LAB
ALBUMIN SERPL BCP-MCNC: 3.7 G/DL (ref 3.5–5)
ALP SERPL-CCNC: 138 U/L (ref 34–104)
ALT SERPL W P-5'-P-CCNC: 95 U/L (ref 7–52)
ANION GAP SERPL CALCULATED.3IONS-SCNC: 10 MMOL/L (ref 4–13)
AST SERPL W P-5'-P-CCNC: 48 U/L (ref 13–39)
ATRIAL RATE: 43 BPM
ATRIAL RATE: 43 BPM
BILIRUB SERPL-MCNC: 0.78 MG/DL (ref 0.2–1)
BUN SERPL-MCNC: 9 MG/DL (ref 5–25)
CALCIUM SERPL-MCNC: 8.6 MG/DL (ref 8.4–10.2)
CHLORIDE SERPL-SCNC: 104 MMOL/L (ref 96–108)
CO2 SERPL-SCNC: 20 MMOL/L (ref 21–32)
CREAT SERPL-MCNC: 0.68 MG/DL (ref 0.6–1.3)
ERYTHROCYTE [DISTWIDTH] IN BLOOD BY AUTOMATED COUNT: 13.4 % (ref 11.6–15.1)
GFR SERPL CREATININE-BSD FRML MDRD: 114 ML/MIN/1.73SQ M
GLUCOSE SERPL-MCNC: 129 MG/DL (ref 65–140)
HCT VFR BLD AUTO: 36 % (ref 34.8–46.1)
HGB BLD-MCNC: 11.6 G/DL (ref 11.5–15.4)
MCH RBC QN AUTO: 28.4 PG (ref 26.8–34.3)
MCHC RBC AUTO-ENTMCNC: 32.2 G/DL (ref 31.4–37.4)
MCV RBC AUTO: 88 FL (ref 82–98)
P AXIS: 17 DEGREES
P AXIS: 24 DEGREES
PLATELET # BLD AUTO: 321 THOUSANDS/UL (ref 149–390)
PMV BLD AUTO: 8.6 FL (ref 8.9–12.7)
POTASSIUM SERPL-SCNC: 4.3 MMOL/L (ref 3.5–5.3)
PR INTERVAL: 126 MS
PR INTERVAL: 130 MS
PROT SERPL-MCNC: 6.5 G/DL (ref 6.4–8.4)
QRS AXIS: -6 DEGREES
QRS AXIS: -9 DEGREES
QRSD INTERVAL: 70 MS
QRSD INTERVAL: 76 MS
QT INTERVAL: 472 MS
QT INTERVAL: 478 MS
QTC INTERVAL: 398 MS
QTC INTERVAL: 403 MS
RBC # BLD AUTO: 4.08 MILLION/UL (ref 3.81–5.12)
SODIUM SERPL-SCNC: 134 MMOL/L (ref 135–147)
T WAVE AXIS: 7 DEGREES
T WAVE AXIS: 9 DEGREES
VENTRICULAR RATE: 43 BPM
VENTRICULAR RATE: 43 BPM
WBC # BLD AUTO: 11.22 THOUSAND/UL (ref 4.31–10.16)

## 2023-02-22 RX ORDER — ACETAMINOPHEN 325 MG/1
650 TABLET ORAL EVERY 6 HOURS SCHEDULED
Refills: 0 | Status: CANCELLED | COMMUNITY
Start: 2023-02-22

## 2023-02-22 RX ORDER — HYDROCODONE BITARTRATE AND ACETAMINOPHEN 5; 325 MG/1; MG/1
1 TABLET ORAL EVERY 4 HOURS PRN
Qty: 12 TABLET | Refills: 0 | Status: CANCELLED | OUTPATIENT
Start: 2023-02-22 | End: 2023-03-04

## 2023-02-22 RX ADMIN — FLUTICASONE PROPIONATE 1 SPRAY: 50 SPRAY, METERED NASAL at 10:09

## 2023-02-22 RX ADMIN — FLUTICASONE PROPIONATE 1 SPRAY: 50 SPRAY, METERED NASAL at 17:27

## 2023-02-22 RX ADMIN — ACETAMINOPHEN 650 MG: 325 TABLET, FILM COATED ORAL at 05:16

## 2023-02-22 RX ADMIN — DOCUSATE SODIUM 100 MG: 100 CAPSULE, LIQUID FILLED ORAL at 10:09

## 2023-02-22 RX ADMIN — AZELASTINE 1 SPRAY: 1 SPRAY, METERED NASAL at 10:09

## 2023-02-22 RX ADMIN — FLUTICASONE FUROATE AND VILANTEROL TRIFENATATE 1 PUFF: 200; 25 POWDER RESPIRATORY (INHALATION) at 10:09

## 2023-02-22 RX ADMIN — SODIUM CHLORIDE, SODIUM GLUCONATE, SODIUM ACETATE, POTASSIUM CHLORIDE, MAGNESIUM CHLORIDE, SODIUM PHOSPHATE, DIBASIC, AND POTASSIUM PHOSPHATE 75 ML/HR: .53; .5; .37; .037; .03; .012; .00082 INJECTION, SOLUTION INTRAVENOUS at 17:27

## 2023-02-22 RX ADMIN — DOCUSATE SODIUM 100 MG: 100 CAPSULE, LIQUID FILLED ORAL at 17:26

## 2023-02-22 RX ADMIN — AZELASTINE 1 SPRAY: 1 SPRAY, METERED NASAL at 17:27

## 2023-02-22 RX ADMIN — SODIUM CHLORIDE, SODIUM GLUCONATE, SODIUM ACETATE, POTASSIUM CHLORIDE, MAGNESIUM CHLORIDE, SODIUM PHOSPHATE, DIBASIC, AND POTASSIUM PHOSPHATE 75 ML/HR: .53; .5; .37; .037; .03; .012; .00082 INJECTION, SOLUTION INTRAVENOUS at 05:16

## 2023-02-22 RX ADMIN — ACETAMINOPHEN 650 MG: 325 TABLET, FILM COATED ORAL at 12:24

## 2023-02-22 RX ADMIN — ACETAMINOPHEN 650 MG: 325 TABLET, FILM COATED ORAL at 00:02

## 2023-02-22 RX ADMIN — ACETAMINOPHEN 650 MG: 325 TABLET, FILM COATED ORAL at 17:26

## 2023-02-22 NOTE — ASSESSMENT & PLAN NOTE
Recent Labs     02/20/23  0217 02/21/23  0557 02/22/23  0322   WBC 15 36* 8 94 11 22*   POA leukocytosis 15 36  Urine dipstick shows negative for nitrites, leukocytes, bacteria, glucose, protein, ketones, positive for red blood cells, urobilinogen    Likely reactive in the setting of pancreatitis  Low suspicion for infection  Afebrile  Plan:  · Trend Fever and WBC curve  · Follow up BCx

## 2023-02-22 NOTE — PROGRESS NOTES
Progress Note - General Surgery   Render Bernard Mccarthy 27 y o  female MRN: [id number]  Unit/Bed#: W -01 Encounter: [id number]    Assessment:  28yoM p/w gallstone pancreatitis now s/p laparoscopic cholecystectomy on 2/21    Vitals stable, afebrile  WBC 11 22 from 8 94  Hb 11 6 from 11 3  t bili 0 78  Cr 0 68    Plan:  -low fat diet  -DVT prophylaxis  -Pain control  -Encourage ambulation  -Incentive spirometry  -Care per primary team  -can be discharged from surgical standpoint later today if tolerating diet and pain free, follow up in the office in 2 weeks    Subjective/Objective   Subjective:   Doing well, reports some minimal abdominal pain, tolerating diet without nausea or emesis, voiding, ambulating, using IS, no flatus/BM yet  Objective:     Blood pressure 121/64, pulse (!) 43, temperature 98 4 °F (36 9 °C), resp  rate 16, SpO2 96 %, currently breastfeeding  ,There is no height or weight on file to calculate BMI  Intake/Output Summary (Last 24 hours) at 2/22/2023 0836  Last data filed at 2/22/2023 0516  Gross per 24 hour   Intake 4312 75 ml   Output 25 ml   Net 4287 75 ml       Invasive Devices     Peripheral Intravenous Line  Duration           Peripheral IV 02/20/23 Left Antecubital 2 days                Physical Exam:  General: NAD  Skin: Warm, dry, anicteric  HEENT: Normocephalic, atraumatic  CV: RRR, no m/r/g  Pulm: CTA b/l, no inc WOB  Abd: Soft, ND/NT  MSK: Symmetric, no edema, no tenderness, no deformity  Neuro: AOx3, GCS 15    Lab, Imaging and other studies:  I have personally reviewed pertinent lab results    , CBC:   Lab Results   Component Value Date    WBC 11 22 (H) 02/22/2023    HGB 11 6 02/22/2023    HCT 36 0 02/22/2023    MCV 88 02/22/2023     02/22/2023    MCH 28 4 02/22/2023    MCHC 32 2 02/22/2023    RDW 13 4 02/22/2023    MPV 8 6 (L) 02/22/2023   , CMP:   Lab Results   Component Value Date    SODIUM 134 (L) 02/22/2023    K 4 3 02/22/2023     02/22/2023    CO2 20 (L) 02/22/2023    BUN 9 02/22/2023    CREATININE 0 68 02/22/2023    CALCIUM 8 6 02/22/2023    AST 48 (H) 02/22/2023    ALT 95 (H) 02/22/2023    ALKPHOS 138 (H) 02/22/2023    EGFR 114 02/22/2023     VTE Pharmacologic Prophylaxis: Sequential compression device (Venodyne)   VTE Mechanical Prophylaxis: sequential compression device

## 2023-02-22 NOTE — ASSESSMENT & PLAN NOTE
Complaining of epigastric abdominal pain with radiation to the back  Associated with nausea, vomiting, diarrhea onset 2 days ago and has progressively worsened  · No history of EtOH abuse, gallstones  S/P  without complications 1 month ago  · Lipase- 9922  · CTA AP- Fluid and fat stranding about the pancreas likely due to pancreatitis, No discrete peripancreatic collection, No evidence of pancreatic necrosis, mild ascites, cholelithiasis without evidence of cholecystitis  · RUQ US: Cholelithiasis, mild GB wall thickening without other evidence of acute cholecystitis   No intrahepatic or extrahepatic bile duct dilatation  · Etiology: Likely gallstone pancreatitis  · Labs: Leukocytosis improves, down trended hepatic function levels  · GI Consulted, Appreciciate recommendations  · General surgery consulted, input appreciated,   · Triglycerides WNL  · 22: Patient with OR for laparoscopic cholecystectomy without any complications  Plan:  · Diet advanced to low residue diet   · IV Fluids  · Pain management

## 2023-02-22 NOTE — OP NOTE
OPERATIVE REPORT  PATIENT NAME: Claudia Carson    :    MRN: [id number]  Pt Location: [facility] OR ROOM 04    SURGERY DATE: 2023    Surgeon(s) and Role:     * Adilia Mercado MD - Primary     * Gina Weir MD - Assisting    Preop Diagnosis:  Pancreatitis [K85 90]  Gallstone [K80 20]    Post-Op Diagnosis Codes:     * Pancreatitis [K85 90]     * Gallstone [K80 20]    Procedure(s):  CHOLECYSTECTOMY LAPAROSCOPIC W/ INTRAOP CHOLANGIOGRAM    Specimen(s):  ID Type Source Tests Collected by Time Destination   1 :  Tissue Gallbladder TISSUE EXAM Adilia Mercado MD 2023  6:33 PM        Estimated Blood Loss:   Minimal    Drains:  * No LDAs found *    Anesthesia Type:   General    Operative Indications:  Pancreatitis [K85 90]  Gallstone [K80 20]    Independent, non-smoker, ASA 2, wound class II, BMI 37, weight 210, height 63  (+) Chest pain       GI/HEPATIC   (+) Pancreatitis       NEURO/PSYCH   (+) History of insulin controlled gestational diabetes mellitus         Complications:   None    Procedure and Technique:  Claudia Carson is a 27 y o  female was brought into the operative suite and identified visually and by arm band  The patient was placed in the supine position  Careful attention towards positioning of extremities was completed  After sterile prep and drape a timeout was completed  Athrombic pumps in place  Antibiotics provided  After instillation of local analgesia an incision was made at the umbilicus   With blunt dissection the peritoneum was identified  This was pulled upward using Kocher clamps  An incision was made  Hemostat was used to bluntly puncture the peritoneum  Intra-abdominal location was verified  A trocar was then inserted  CO2 was then insufflated with a back pressure of 15  After Marcaine instillation at each additional site, additional trochars are placed under direct vision into the upper aspect of the abdomen      Laparoscopic visualization revealed a normal liver and normal stomach  No excess peritoneal fluid  The gallbladder was pulled with traction inferiorly, and the liver was pushed superior  A dome down technique was completed using electrocautery  This technique carried down to the level of Faith's pouch  Careful attention was made towards location of the right hepatic artery, cystic artery, common bile duct, right hepatic duct and the cystic duct  Careful attention was made towards the critical anatomy at this region  The cystic duct was identified inserting into the base of the gallbladder  Cystic artery was identified also inserting into the base of the gallbladder  The cystic duct was partially transected  An intraoperative cholangiogram was then taken  No filling defects were noted within the hepatic or common bile duct system  The cystic duct was then clipped ×3 and divided  The cystic artery was clipped ×3 and divided  The gallbladder was then removed from the liver bed with additional electrocautery  The area was copiously irrigated  Hemostasis was assured  The gallbladder was placed into an Endo-Catch bag, and was then removed through the umbilical incision  Fascia was closed with 0 Vicryl suture  The subcutaneous components were irrigated  The subcuticular incisions were then closed  Histacryl was then applied  The patient was awakened from general anesthesia and transferred to the recovery room in stable condition  Sponge and instrument count correct ×2  A postoperative deep briefing was completed       I was present for the entire procedure    Patient Disposition:  PACU         SIGNATURE: Claudette Brunet, MD  DATE: February 21, 2023  TIME: 7:04 PM

## 2023-02-22 NOTE — ASSESSMENT & PLAN NOTE
Patient has been persistently bradycardic status post laparoscopic cholecystectomy and general anesthesia  · EKG: Sinus bradycardia  · Cardiology recommended placing patient on telemetry to monitor overnight

## 2023-02-22 NOTE — PROGRESS NOTES
[facility]  Progress Note Anthonysoco Chowdhury OrtiAndrae , 27 y o  female MRN: [id number]  Unit/Bed#: W -01 Encounter: [id number]  Primary Care Provider: Delvis Matthews MD   Date and time admitted to hospital: 2023  1:40 AM    * Pancreatitis  Assessment & Plan  Complaining of epigastric abdominal pain with radiation to the back  Associated with nausea, vomiting, diarrhea onset 2 days ago and has progressively worsened  · No history of EtOH abuse, gallstones  S/P  without complications 1 month ago  · Lipase- 9922  · CTA AP- Fluid and fat stranding about the pancreas likely due to pancreatitis, No discrete peripancreatic collection, No evidence of pancreatic necrosis, mild ascites, cholelithiasis without evidence of cholecystitis  · RUQ US: Cholelithiasis, mild GB wall thickening without other evidence of acute cholecystitis   No intrahepatic or extrahepatic bile duct dilatation  · Etiology: Likely gallstone pancreatitis  · Labs: Leukocytosis improves, down trended hepatic function levels  · GI Consulted, Appreciciate recommendations  · General surgery consulted, input appreciated,   · Triglycerides WNL  · 22: Patient with OR for laparoscopic cholecystectomy without any complications  Plan:  · Diet advanced to low residue diet   · IV Fluids  · Pain management    Elevated LFTs  Assessment & Plan  Elevated LFTs  POA , ,  , T  Bili- 3 16  DDx including but not limited to: Hepatitis, pancreatitis, cholecystitis, biliary colic, choledocholithiasis  Consider possible obstructing stone not visualized on CT abdomen  RUQ US: see above  Improving: Liver function labs trending downwarrded   Plan:  - Monitor CMP    Leukocytosis  Assessment & Plan  Recent Labs     23  0217 23  0557 23  0322   WBC 15 36* 8 94 11 22*   POA leukocytosis 15 36  Urine dipstick shows negative for nitrites, leukocytes, bacteria, glucose, protein, ketones, positive for red blood cells, urobilinogen  Likely reactive in the setting of pancreatitis  Low suspicion for infection  Afebrile  Plan:  · Trend Fever and WBC curve  · Follow up BCx    Chest pain  Assessment & Plan  DDX including but not limited to: chest wall pain, costochondritis, pleurisy, pericarditis, myocarditis, PTX, pneumonia, GI etiology; doubt ACS or MI or dissection or PE or rhabdomyolysis  Troponin- 3  EKG- Sinus arrhythmia, no ST T wave changes  Complain of epigastric pain consistent with GERD  Plan:   · Pepcid 20 mg IV BID      SOB (shortness of breath)-resolved as of 2/21/2023  Assessment & Plan  Likely secondary to abdominal pain  D-dimer 2 98  CTA- No pulmonary embolism  97% on RA, afebrile  COVID/flu/RSV-Negative  CxR:  No acute cardiopulmonary disease  Plan-  6  Cardiopulmonary monitoring   7  Pain management for abdominal pain   8  COVID/flu/RSV- negative      Bradycardia  Assessment & Plan  Patient has been persistently bradycardic status post laparoscopic cholecystectomy and general anesthesia  · EKG: Sinus bradycardia  · Cardiology recommended placing patient on telemetry to monitor overnight        VTE Pharmacologic Prophylaxis: VTE Score: 1 Low Risk (Score 0-2) - Encourage Ambulation  Moderate Risk (Score 3-4) - Pharmacological DVT Prophylaxis Ordered: Heparin Drip  Patient Centered Rounds: I performed bedside rounds with nursing staff today  Discussions with Specialists or Other Care Team Provider: IP CONSULT TO GASTROENTEROLOGY  IP CONSULT TO ACUTE CARE SURGERY     Education and Discussions with Family / Patient: Updated  () at bedside  Current Length of Stay: 2 day(s)  Current Patient Status: Inpatient   Discharge Plan: Anticipate discharge in 24-48 hrs to home  Code Status: Level 1 - Full Code    Subjective:   Patient is status post laparoscopic cholecystectomy postop day 1  No complications during procedure  However patient has been persistently bradycardic    EKG sinus bradycardia  However cardiology would likely to be placed on telemetry overnight for monitoring  Patient reports abdominal pain has improved  Notes she still has not had a bowel movement since being admitted  Denies any shortness of breath, but does report epigastric pain that has been continuous since presentation  Reports that it is intermittent and consistent with acid reflux presentation  Objective:     Vitals:   Temp (24hrs), Av 5 °F (36 9 °C), Min:97 5 °F (36 4 °C), Max:99 4 °F (37 4 °C)    Temp:  [97 5 °F (36 4 °C)-99 4 °F (37 4 °C)] 98 2 °F (36 8 °C)  HR:  [43-73] 47  Resp:  [14-18] 14  BP: (103-121)/(55-69) 103/56  SpO2:  [92 %-96 %] 96 %  There is no height or weight on file to calculate BMI  Input and Output Summary (last 24 hours): Intake/Output Summary (Last 24 hours) at 2023 1345  Last data filed at 2023 0516  Gross per 24 hour   Intake  75 ml   Output 25 ml   Net  75 ml       Physical Exam:   Physical Exam  Vitals reviewed  Constitutional:       General: She is not in acute distress  Appearance: She is normal weight  She is not ill-appearing or toxic-appearing  HENT:      Head: Normocephalic and atraumatic  Mouth/Throat:      Mouth: Mucous membranes are moist    Cardiovascular:      Rate and Rhythm: Regular rhythm  Bradycardia present  Heart sounds: No murmur heard  Pulmonary:      Effort: Pulmonary effort is normal  No respiratory distress  Abdominal:      General: There is no distension  Palpations: Abdomen is soft  Tenderness: There is no abdominal tenderness  There is no guarding or rebound  Negative signs include Vaz's sign  Musculoskeletal:         General: No swelling  Right lower leg: No edema  Left lower leg: No edema  Skin:     General: Skin is warm and dry  Neurological:      General: No focal deficit present  Mental Status: She is alert and oriented to person, place, and time   Mental status is at baseline  Psychiatric:         Mood and Affect: Mood normal          Behavior: Behavior normal           Additional Data:     Labs:     Results from last 7 days   Lab Units 02/22/23  0322 02/21/23  0557 02/20/23  0217   WBC Thousand/uL 11 22* 8 94 15 36*   HEMOGLOBIN g/dL 11 6 11 3* 13 8   HEMATOCRIT % 36 0 35 2 42 5   PLATELETS Thousands/uL 321 329 435*   NEUTROS PCT %  --  69 86*   LYMPHS PCT %  --  22 7*   MONOS PCT %  --  6 5   EOS PCT %  --  2 1     Results from last 7 days   Lab Units 02/22/23  0322   SODIUM mmol/L 134*   POTASSIUM mmol/L 4 3   CHLORIDE mmol/L 104   CO2 mmol/L 20*   BUN mg/dL 9   CREATININE mg/dL 0 68   ANION GAP mmol/L 10   CALCIUM mg/dL 8 6   ALBUMIN g/dL 3 7   TOTAL BILIRUBIN mg/dL 0 78   ALK PHOS U/L 138*   ALT U/L 95*   AST U/L 48*   GLUCOSE RANDOM mg/dL 129     Results from last 7 days   Lab Units 02/20/23  0217   INR  0 94             Results from last 7 days   Lab Units 02/20/23  0217   LACTIC ACID mmol/L 0 7       Lines/Drains:  Invasive Devices     Peripheral Intravenous Line  Duration           Peripheral IV 02/20/23 Left Antecubital 2 days                Imaging: Reviewed radiology reports from this admission including: ultrasound(s)    Recent Cultures (last 7 days):   Results from last 7 days   Lab Units 02/20/23  0217   BLOOD CULTURE  No Growth at 48 hrs  No Growth at 48 hrs         Last 24 Hours Medication List:   Current Facility-Administered Medications   Medication Dose Route Frequency Provider Last Rate   • acetaminophen  650 mg Oral Q6H Augustina Pond MD     • albuterol  2 puff Inhalation Q4H PRN Elis Lyn MD     • azelastine  1 spray Each Nare BID Elis Lyn MD     • docusate sodium  100 mg Oral BID Elis Lyn MD     • fluticasone  1 spray Each Nare BID Elis Lyn MD     • fluticasone-vilanterol  1 puff Inhalation Daily Elis Lyn MD     • HYDROcodone-acetaminophen  1 tablet Oral Q4H PRN Elis Lyn MD • HYDROmorphone  0 2 mg Intravenous Q3H PRN Vicky Barlow MD     • multi-electrolyte  75 mL/hr Intravenous Continuous Vicky Barlow MD 75 mL/hr (02/22/23 0516)   • ondansetron  4 mg Intravenous Q6H PRN Vicky Barlow MD     • polyethylene glycol  17 g Oral Daily PRN Vicky Barlow MD          Today, Patient Was Seen By: Dorothea Wolff DO    **Please Note: This note may have been constructed using a voice recognition system  **

## 2023-02-22 NOTE — ASSESSMENT & PLAN NOTE
Likely secondary to abdominal pain  D-dimer 2 98  CTA- No pulmonary embolism  97% on RA, afebrile  COVID/flu/RSV-Negative  CxR:  No acute cardiopulmonary disease  Plan-  1  Cardiopulmonary monitoring   2  Pain management for abdominal pain   3   COVID/flu/RSV- negative

## 2023-02-22 NOTE — QUICK NOTE
S: Rebecca Humphrey is a 27 y o  female who returns to the floor s/p laparoscopic cholecystectomy  EBL minimal   Patient tolerated the procedure well, without complications, was extubated in OR, returned to PACU in stable condition  Patient reports some abdominal soreness postoperatively mainly around her incisions, still somewhat somnolent and groggy from anesthesia, has not yet eaten but denies any nausea or emesis, not yet ambulating, using incentive spirometry      O:    Vitals:    02/21/23 2224   BP:    Pulse: (!) 49   Resp:    Temp:    SpO2: 94%     General: NAD  Skin: Warm, dry, anicteric  HEENT: Normocephalic, atraumatic  CV: RRR, no m/r/g  Pulm: CTA b/l, no inc WOB  Abd: Soft, ND, minimally tender, incisions clean dry and intact  MSK: Symmetric, no edema, no tenderness, no deformity  Neuro: AOx3, GCS 15    A: 32yoM p/w gallstone pancreatitis now s/p laparoscopic cholecystectomy on 2/21    P:  -Surgical soft diet  [email or web address] until tolerating adequate p o   -DVT prophylaxis  -Pain control  -Encourage ambulation  -Incentive spirometry  -Care per primary team

## 2023-02-23 VITALS
TEMPERATURE: 98.3 F | DIASTOLIC BLOOD PRESSURE: 69 MMHG | HEART RATE: 63 BPM | RESPIRATION RATE: 18 BRPM | OXYGEN SATURATION: 97 % | SYSTOLIC BLOOD PRESSURE: 109 MMHG

## 2023-02-23 LAB
ALBUMIN SERPL BCP-MCNC: 3.5 G/DL (ref 3.5–5)
ALP SERPL-CCNC: 107 U/L (ref 34–104)
ALT SERPL W P-5'-P-CCNC: 64 U/L (ref 7–52)
ANION GAP SERPL CALCULATED.3IONS-SCNC: 9 MMOL/L (ref 4–13)
AST SERPL W P-5'-P-CCNC: 25 U/L (ref 13–39)
BILIRUB SERPL-MCNC: 0.46 MG/DL (ref 0.2–1)
BUN SERPL-MCNC: 13 MG/DL (ref 5–25)
CALCIUM SERPL-MCNC: 8.5 MG/DL (ref 8.4–10.2)
CHLORIDE SERPL-SCNC: 106 MMOL/L (ref 96–108)
CO2 SERPL-SCNC: 23 MMOL/L (ref 21–32)
CREAT SERPL-MCNC: 0.72 MG/DL (ref 0.6–1.3)
ERYTHROCYTE [DISTWIDTH] IN BLOOD BY AUTOMATED COUNT: 14 % (ref 11.6–15.1)
GFR SERPL CREATININE-BSD FRML MDRD: 109 ML/MIN/1.73SQ M
GLUCOSE SERPL-MCNC: 103 MG/DL (ref 65–140)
HCT VFR BLD AUTO: 32.6 % (ref 34.8–46.1)
HGB BLD-MCNC: 10.7 G/DL (ref 11.5–15.4)
MAGNESIUM SERPL-MCNC: 1.9 MG/DL (ref 1.9–2.7)
MCH RBC QN AUTO: 28.9 PG (ref 26.8–34.3)
MCHC RBC AUTO-ENTMCNC: 32.8 G/DL (ref 31.4–37.4)
MCV RBC AUTO: 88 FL (ref 82–98)
PLATELET # BLD AUTO: 302 THOUSANDS/UL (ref 149–390)
PMV BLD AUTO: 8.9 FL (ref 8.9–12.7)
POTASSIUM SERPL-SCNC: 3.8 MMOL/L (ref 3.5–5.3)
PROT SERPL-MCNC: 6.2 G/DL (ref 6.4–8.4)
RBC # BLD AUTO: 3.7 MILLION/UL (ref 3.81–5.12)
SODIUM SERPL-SCNC: 138 MMOL/L (ref 135–147)
WBC # BLD AUTO: 10 THOUSAND/UL (ref 4.31–10.16)

## 2023-02-23 RX ADMIN — FLUTICASONE PROPIONATE 1 SPRAY: 50 SPRAY, METERED NASAL at 09:00

## 2023-02-23 RX ADMIN — FLUTICASONE FUROATE AND VILANTEROL TRIFENATATE 1 PUFF: 200; 25 POWDER RESPIRATORY (INHALATION) at 09:00

## 2023-02-23 RX ADMIN — AZELASTINE 1 SPRAY: 1 SPRAY, METERED NASAL at 09:00

## 2023-02-23 RX ADMIN — DOCUSATE SODIUM 100 MG: 100 CAPSULE, LIQUID FILLED ORAL at 09:00

## 2023-02-23 RX ADMIN — ACETAMINOPHEN 650 MG: 325 TABLET, FILM COATED ORAL at 12:26

## 2023-02-23 RX ADMIN — SODIUM CHLORIDE, SODIUM GLUCONATE, SODIUM ACETATE, POTASSIUM CHLORIDE, MAGNESIUM CHLORIDE, SODIUM PHOSPHATE, DIBASIC, AND POTASSIUM PHOSPHATE 75 ML/HR: .53; .5; .37; .037; .03; .012; .00082 INJECTION, SOLUTION INTRAVENOUS at 05:52

## 2023-02-23 RX ADMIN — ACETAMINOPHEN 650 MG: 325 TABLET, FILM COATED ORAL at 05:46

## 2023-02-23 RX ADMIN — ACETAMINOPHEN 650 MG: 325 TABLET, FILM COATED ORAL at 00:17

## 2023-02-23 NOTE — ASSESSMENT & PLAN NOTE
Patient has been persistently bradycardic status post laparoscopic cholecystectomy and general anesthesia  · EKG: Marked sinus bradycardia  · Cardiology recommended placing patient on telemetry to monitor overnight

## 2023-02-23 NOTE — PLAN OF CARE
Problem: PAIN - ADULT  Goal: Verbalizes/displays adequate comfort level or baseline comfort level  Description: Interventions:  - Encourage patient to monitor pain and request assistance  - Assess pain using appropriate pain scale  - Administer analgesics based on type and severity of pain and evaluate response  - Implement non-pharmacological measures as appropriate and evaluate response  - Consider cultural and social influences on pain and pain management  - Notify physician/advanced practitioner if interventions unsuccessful or patient reports new pain  Outcome: Progressing     Problem: SAFETY ADULT  Goal: Patient will remain free of falls  Description: INTERVENTIONS:  - Educate patient/family on patient safety including physical limitations  - Instruct patient to call for assistance with activity   - Consult OT/PT to assist with strengthening/mobility   - Keep Call bell within reach  - Keep bed low and locked with side rails adjusted as appropriate  - Keep care items and personal belongings within reach  - Initiate and maintain comfort rounds  - Make Fall Risk Sign visible to staff  - Apply yellow socks and bracelet for high fall risk patients  - Consider moving patient to room near nurses station  Outcome: Progressing  Goal: Maintain or return to baseline ADL function  Description: INTERVENTIONS:  -  Assess patient's ability to carry out ADLs; assess patient's baseline for ADL function and identify physical deficits which impact ability to perform ADLs (bathing, care of mouth/teeth, toileting, grooming, dressing, etc )  - Assess/evaluate cause of self-care deficits   - Assess range of motion  - Assess patient's mobility; develop plan if impaired  - Assess patient's need for assistive devices and provide as appropriate  - Encourage maximum independence but intervene and supervise when necessary  - Involve family in performance of ADLs  - Assess for home care needs following discharge   - Consider OT consult to assist with ADL evaluation and planning for discharge  - Provide patient education as appropriate  Outcome: Progressing  Goal: Maintains/Returns to pre admission functional level  Description: INTERVENTIONS:  - Perform BMAT or MOVE assessment daily    - Set and communicate daily mobility goal to care team and patient/family/caregiver  - Collaborate with rehabilitation services on mobility goals if consulted  - Ambulate patient 3times a day  - Out of bed to chair 3 times a day   - Out of bed for meals3 times a day  - Out of bed for toileting  - Record patient progress and toleration of activity level   Outcome: Progressing     Problem: DISCHARGE PLANNING  Goal: Discharge to home or other facility with appropriate resources  Description: INTERVENTIONS:  - Identify barriers to discharge w/patient and caregiver  - Arrange for needed discharge resources and transportation as appropriate  - Identify discharge learning needs (meds, wound care, etc )  - Arrange for interpretive services to assist at discharge as needed  - Refer to Case Management Department for coordinating discharge planning if the patient needs post-hospital services based on physician/advanced practitioner order or complex needs related to functional status, cognitive ability, or social support system  Outcome: Progressing     Problem: Knowledge Deficit  Goal: Patient/family/caregiver demonstrates understanding of disease process, treatment plan, medications, and discharge instructions  Description: Complete learning assessment and assess knowledge base    Interventions:  - Provide teaching at level of understanding  - Provide teaching via preferred learning methods  Outcome: Progressing     Problem: METABOLIC, FLUID AND ELECTROLYTES - ADULT  Goal: Electrolytes maintained within normal limits  Description: INTERVENTIONS:  - Monitor labs and assess patient for signs and symptoms of electrolyte imbalances  - Administer electrolyte replacement as ordered  - Monitor response to electrolyte replacements, including repeat lab results as appropriate  - Instruct patient on fluid and nutrition as appropriate  Outcome: Progressing  Goal: Fluid balance maintained  Description: INTERVENTIONS:  - Monitor labs   - Monitor I/O and WT  - Instruct patient on fluid and nutrition as appropriate  - Assess for signs & symptoms of volume excess or deficit  Outcome: Progressing

## 2023-02-23 NOTE — PROGRESS NOTES
Progress Note - General Surgery   Leobardo Mccarthy 27 y o  female MRN: [id number]  Unit/Bed#: S -01 Encounter: [id number]    Assessment:  28yoM p/w gallstone pancreatitis now s/p laparoscopic cholecystectomy on 2/21    Bradycardic yesterday, HR 50s-60s overnight  WBC 10 (11 22)  Hb 10 7 (11 6)  t bili 0 46 (0 78)    Plan:  -low fat diet  -Pain control  -encourage ambulation, IS, OOB  -remainder of care per primary team  -general surgery will be available for any questions or concerns  The patient is stable for discharge from surgical standpoint  Please reach out if the patient remains admitted and has any post-op concerns   -Please TigerText the surgery resident or AP role with any questions  Subjective/Objective   Subjective:   No acute events overnight  Patient remained inpatient for cardiac monitoring  Pain well controlled, tolerating diet  NO nausea/vomiting  Passing flatus  Objective:     Blood pressure 109/69, pulse 63, temperature 98 3 °F (36 8 °C), resp  rate 18, SpO2 97 %, currently breastfeeding  ,There is no height or weight on file to calculate BMI  Intake/Output Summary (Last 24 hours) at 2/23/2023 0315  Last data filed at 2/22/2023 0516  Gross per 24 hour   Intake 693 75 ml   Output --   Net 693 75 ml       Invasive Devices     Peripheral Intravenous Line  Duration           Peripheral IV 02/20/23 Left Antecubital 3 days                Physical Exam:  General: No acute distress  Neuro: alert and oriented  HEENT: moist mucous membranes  CV: Well perfused, regular rate and rhythm  Lungs: Normal work of breathing, no increased respiratory effort  Abdomen: Soft, minimally tender, non-distended  Incisions clean, dry and intact  Extremities: No edema, clubbing or cyanosis  Skin: Warm, dry      Lab, Imaging and other studies:  I have personally reviewed pertinent lab results    , CBC:   Lab Results   Component Value Date    WBC 10 00 02/23/2023    HGB 10 7 (L) 02/23/2023    HCT 32 6 (L) 02/23/2023    MCV 88 02/23/2023     02/23/2023    MCH 28 9 02/23/2023    MCHC 32 8 02/23/2023    RDW 14 0 02/23/2023    MPV 8 9 02/23/2023   , CMP:   Lab Results   Component Value Date    SODIUM 138 02/23/2023    K 3 8 02/23/2023     02/23/2023    CO2 23 02/23/2023    BUN 13 02/23/2023    CREATININE 0 72 02/23/2023    CALCIUM 8 5 02/23/2023    AST 25 02/23/2023    ALT 64 (H) 02/23/2023    ALKPHOS 107 (H) 02/23/2023    EGFR 109 02/23/2023     VTE Pharmacologic Prophylaxis: Sequential compression device (Venodyne)   VTE Mechanical Prophylaxis: sequential compression device

## 2023-02-23 NOTE — DISCHARGE SUMMARY
[facility]  H&P- Alejandra Miller , 27 y o  female MRN: [id number]  Unit/Bed#: S -01 Encounter: [id number]  Primary Care Provider: Hedda Closs, MD   Date and time admitted to hospital: 2023  1:40 AM    * Pancreatitis  Assessment & Plan  Complaining of epigastric abdominal pain with radiation to the back  Associated with nausea, vomiting, diarrhea onset 2 days ago and has progressively worsened  · No history of EtOH abuse, gallstones  S/P  without complications 1 month ago  · Lipase- 9922  · CTA AP- Fluid and fat stranding about the pancreas likely due to pancreatitis, No discrete peripancreatic collection, No evidence of pancreatic necrosis, mild ascites, cholelithiasis without evidence of cholecystitis  · RUQ US: Cholelithiasis, mild GB wall thickening without other evidence of acute cholecystitis   No intrahepatic or extrahepatic bile duct dilatation  · Etiology: Likely gallstone pancreatitis  · Labs: Leukocytosis improves, down trended hepatic function levels  · GI Consulted, Appreciciate recommendations  · General surgery consulted, input appreciated,   · Triglycerides WNL  · 22: Patient with OR for laparoscopic cholecystectomy without any complications  Plan:  · Diet advanced to low residue diet   · IV Fluids   · Pain management     Elevated LFTs  Assessment & Plan  Elevated LFTs  POA , ,  , T  Bili- 3 16  DDx including but not limited to: Hepatitis, pancreatitis, cholecystitis, biliary colic, choledocholithiasis  Consider possible obstructing stone not visualized on CT abdomen  RUQ US: see above  Improving: Liver function labs trending downwarrded     Leukocytosis  Assessment & Plan  Recent Labs     23  0557 23  0322 23  0457   WBC 8 94 11 22* 10 00   POA leukocytosis 15 36  Urine dipstick shows negative for nitrites, leukocytes, bacteria, glucose, protein, ketones, positive for red blood cells, urobilinogen  Likely reactive in the setting of pancreatitis  Low suspicion for infection  Afebrile  Blood cultures, no growth at 72 hours    Chest pain  Assessment & Plan  DDX including but not limited to: chest wall pain, costochondritis, pleurisy, pericarditis, myocarditis, PTX, pneumonia, GI etiology; doubt ACS or MI or dissection or PE or rhabdomyolysis  Troponin - 3  EKG- Sinus arrhythmia, no ST T wave changes  Complain of epigastric pain consistent with GERD  Was given PPI with improvement of symptoms    SOB (shortness of breath)-resolved as of 2/21/2023  Assessment & Plan  Likely secondary to abdominal pain  D-dimer 2 98  CTA- No pulmonary embolism  97% on RA, afebrile  COVID/flu/RSV-Negative  CxR:  No acute cardiopulmonary disease  Plan-  4  Cardiopulmonary monitoring   5  Pain management for abdominal pain   6  COVID/flu/RSV- negative    Bradycardia  Assessment & Plan  Patient has been persistently bradycardic status post laparoscopic cholecystectomy and general anesthesia  · EKG: Marked sinus bradycardia  · Cardiology recommended placing patient on telemetry to monitor overnight      Medical Problems     Resolved Problems  Date Reviewed: 2/20/2023          Resolved    SOB (shortness of breath) 2/21/2023     Resolved by  Radha Mercado DO        Discharging Resident: DO Ashli Lerma Attending: Epifanio Ramon MD  PCP: Mikayla Mas MD  Admission Date:   Admission Orders (From admission, onward)     Ordered        02/20/23 0536  INPATIENT ADMISSION  Once                      Discharge Date: 02/23/23     Consultations During Hospital Stay:  IP CONSULT TO GASTROENTEROLOGY  IP CONSULT TO ACUTE CARE SURGERY    Procedures Performed:   2/21/2023: Laproscopic Cholecystectomy with intraoperative cholangiogram    Significant Findings / Test Results:   PE Study w/ CTAP w/ Contrast:  11  No evidence of pulmonary embolism    12  Fluid and fat stranding about the pancreas likely due to pancreatitis  No discrete peripancreatic collection  No evidence of pancreatic necrosis  Mild ascites  15  Cholelithiasis without evidence of cholecystitis  14  1 7 cm peripherally calcified fat-containing lesion in the left ovary likely due to a dermoid cyst   Right Upper Quadrant US  1  Cholelithiasis  2  Nonspecific mild gallbladder wall thickening with no other evidence of acute cholecystitis  3  No intrahepatic or extrahepatic bile duct dilatation  Test Results Pending at Discharge (will require follow up): · None     Outpatient Tests Requested:  · None    Complications:    · Patient had    Reason for Admission: Acute gallstone pancreatitis  Hospital Course:   Felix Alcala is a 27 y o  female patient who originally presented to the hospital on 2/20/2023 due to abdominal pain  Was found to have evidence of pancreatitis on CT abdomen pelvis with contrast   Patient was also noted to have transaminitis with elevated liver enzymes and alk phos  Patient was made n p o  and started on aggressive fluid hydration  Right upper quadrant ultrasound performed showing presence of cholelithiasis without evidence of acute cholecystitis  GI and general surgery were consulted  Patient was sent to the OR for laparoscopic cholecystectomy and tolerated the procedure well  During postoperative period patient was noted to be bradycardia with heart rates as low as 38  Cardiology was notified who recommended placing patient on telemetry overnight as EKG showing marked sinus bradycardia  Heart rate improved and patient was in normal sinus rhythm  Remained asymptomatic  On day of discharge patient had no other complaints and was eager to go home  Please see above list of diagnoses and related plan for additional information  Condition at Discharge: stable    Discharge Day Visit / Exam:   Subjective: Patient seen and evaluated at bedside    Overnight patient was noted to have continuous bradycardia on the telemetry monitor however improved this morning and is now in normal sinus rhythm with a rate above 60  Patient remained asymptomatic and did not have any symptoms in regards to her bradycardia  She is status post lap conchita postop day 2 with minimal abdominal tenderness at incisional sites  Denies any other complaints at this time  Vitals: Blood Pressure: 109/69 (02/23/23 0643)  Pulse: 63 (02/23/23 0713)  Temperature: 98 3 °F (36 8 °C) (02/23/23 0643)  Temp Source: Oral (02/22/23 0321)  Respirations: 18 (02/23/23 0643)  SpO2: 97 % (02/23/23 0713)  Exam:   Physical Exam  Vitals reviewed  Constitutional:       General: She is not in acute distress  Appearance: She is normal weight  She is not ill-appearing or toxic-appearing  HENT:      Head: Normocephalic and atraumatic  Mouth/Throat:      Mouth: Mucous membranes are moist    Cardiovascular:      Rate and Rhythm: Normal rate and regular rhythm  Heart sounds: No murmur heard  Pulmonary:      Effort: Pulmonary effort is normal  No respiratory distress  Abdominal:      General: There is no distension  Palpations: Abdomen is soft  Tenderness: There is no abdominal tenderness  There is no guarding or rebound  Negative signs include Vaz's sign  Musculoskeletal:         General: No swelling  Right lower leg: No edema  Left lower leg: No edema  Skin:     General: Skin is warm and dry  Neurological:      General: No focal deficit present  Mental Status: She is alert and oriented to person, place, and time  Mental status is at baseline  Psychiatric:         Mood and Affect: Mood normal          Behavior: Behavior normal           Discussion with Family: Updated  (significant other) at bedside  Discharge instructions/Information to patient and family:   See after visit summary for information provided to patient and family        Provisions for Follow-Up Care:  See after visit summary for information related to follow-up care and any pertinent home health orders  Disposition:   Home    Planned Readmission:     Discharge Medications:  See after visit summary for reconciled discharge medications provided to patient and/or family        **Please Note: This note may have been constructed using a voice recognition system**

## 2023-02-23 NOTE — ASSESSMENT & PLAN NOTE
Elevated LFTs  POA , ,  , T  Bili- 3 16  DDx including but not limited to: Hepatitis, pancreatitis, cholecystitis, biliary colic, choledocholithiasis  Consider possible obstructing stone not visualized on CT abdomen  RUQ US: see above  Improving: Liver function labs trending downwarrded

## 2023-02-23 NOTE — ASSESSMENT & PLAN NOTE
DDX including but not limited to: chest wall pain, costochondritis, pleurisy, pericarditis, myocarditis, PTX, pneumonia, GI etiology; doubt ACS or MI or dissection or PE or rhabdomyolysis  Troponin - 3  EKG- Sinus arrhythmia, no ST T wave changes  Complain of epigastric pain consistent with GERD  Was given PPI with improvement of symptoms

## 2023-02-23 NOTE — ASSESSMENT & PLAN NOTE
Recent Labs     02/21/23  0557 02/22/23  0322 02/23/23  0457   WBC 8 94 11 22* 10 00   POA leukocytosis 15 36  Urine dipstick shows negative for nitrites, leukocytes, bacteria, glucose, protein, ketones, positive for red blood cells, urobilinogen    Likely reactive in the setting of pancreatitis  Low suspicion for infection  Afebrile  Blood cultures, no growth at 72 hours

## 2023-02-24 ENCOUNTER — TRANSITIONAL CARE MANAGEMENT (OUTPATIENT)
Dept: INTERNAL MEDICINE CLINIC | Facility: CLINIC | Age: 33
End: 2023-02-24

## 2023-02-24 NOTE — UTILIZATION REVIEW
NOTIFICATION OF ADMISSION DISCHARGE   This is a Notification of Discharge from [address]  Please be advised that this patient has been discharge from our facility  Below you will find the admission and discharge date and time including the patient’s disposition  UTILIZATION REVIEW CONTACT:  Dakota Morin MA  Utilization   Network Utilization Review Department  Phone: [phone] x carefully listen to the prompts  All voicemails are confidential   Email: [email or web address]     ADMISSION INFORMATION  PRESENTATION DATE: 2/20/2023  1:40 AM  OBERVATION ADMISSION DATE:   INPATIENT ADMISSION DATE: 2/20/23  5:36 AM   DISCHARGE DATE: 2/23/2023  3:00 PM   DISPOSITION:Home/Self Care    IMPORTANT INFORMATION:  Send all requests for admission clinical reviews, approved or denied determinations and any other requests to dedicated fax number below belonging to the campus where the patient is receiving treatment   List of dedicated fax numbers:  [address] DENIALS (Administrative/Medical Necessity) [phone]   [address] (Maternity/NICU/Pediatrics) [phone]   [facility] [phone]   [facility] 87 [phone]   [address] [phone]   [address] [facility] [phone]   90 [facility] [phone]   [address] [place] [address] 119 [phone]   [facility]  [phone]   [address] [facility] [phone]   412 [facility] [address] [facility] [phone]

## 2023-02-25 LAB
BACTERIA BLD CULT: NORMAL
BACTERIA BLD CULT: NORMAL

## 2023-03-06 ENCOUNTER — OFFICE VISIT (OUTPATIENT)
Dept: INTERNAL MEDICINE CLINIC | Facility: CLINIC | Age: 33
End: 2023-03-06

## 2023-03-06 VITALS
WEIGHT: 205.2 LBS | HEIGHT: 63 IN | SYSTOLIC BLOOD PRESSURE: 104 MMHG | DIASTOLIC BLOOD PRESSURE: 68 MMHG | BODY MASS INDEX: 36.36 KG/M2 | OXYGEN SATURATION: 97 % | HEART RATE: 74 BPM | TEMPERATURE: 97.5 F

## 2023-03-06 DIAGNOSIS — Z23 ENCOUNTER FOR IMMUNIZATION: ICD-10-CM

## 2023-03-06 DIAGNOSIS — Z12.4 SCREENING FOR CERVICAL CANCER: ICD-10-CM

## 2023-03-06 DIAGNOSIS — R79.89 ELEVATED LFTS: Primary | ICD-10-CM

## 2023-03-06 DIAGNOSIS — O24.419 GESTATIONAL DIABETES MELLITUS (GDM), ANTEPARTUM, GESTATIONAL DIABETES METHOD OF CONTROL UNSPECIFIED: ICD-10-CM

## 2023-03-06 DIAGNOSIS — D50.8 OTHER IRON DEFICIENCY ANEMIA: ICD-10-CM

## 2023-03-06 DIAGNOSIS — K85.90 ACUTE PANCREATITIS, UNSPECIFIED COMPLICATION STATUS, UNSPECIFIED PANCREATITIS TYPE: ICD-10-CM

## 2023-03-06 PROBLEM — D64.9 ABSOLUTE ANEMIA: Status: ACTIVE | Noted: 2022-12-07

## 2023-03-06 PROBLEM — R07.9 CHEST PAIN: Status: RESOLVED | Noted: 2023-02-20 | Resolved: 2023-03-06

## 2023-03-06 PROBLEM — R00.1 BRADYCARDIA: Status: RESOLVED | Noted: 2023-02-22 | Resolved: 2023-03-06

## 2023-03-06 NOTE — PROGRESS NOTES
Name: Shannan Glez      : [birth year]      MRN: [id number]  Encounter Provider: aKtelyn Huitron MD  Encounter Date: 3/6/2023   [facility]    Assessment & Plan     1  Elevated LFTs  Assessment & Plan:  Likely 2/2 pancreatitis  Improving on discharge  recheck    Orders:  -     Comprehensive metabolic panel; Future  -     CBC and differential; Future    2  Encounter for immunization    3  Screening for cervical cancer    4  Gestational diabetes mellitus (GDM), antepartum, gestational diabetes method of control unspecified  -     CBC and differential; Future  -     Hemoglobin A1C; Future  -     Lipid panel; Future    5  Other iron deficiency anemia  Assessment & Plan:  Likely 2/2 blood loss from surgery  Recheck cbc    Orders:  -     CBC and differential; Future    6  Acute pancreatitis, unspecified complication status, unspecified pancreatitis type  Assessment & Plan:  Now pain has resolved, s/p cholecystectomy  abd exam unremarkable          TCM Call     Date and time call was made  2023 10:21 AM    Hospital care reviewed  Records not available    Patient was hospitialized at  [address] [place] [address]    Date of Admission  23    Date of discharge  23    Diagnosis  Pancreatitis    Disposition  Home    Were the patients medications reviewed and updated  No      TCM Call     Should patient be enrolled in anticoag monitoring? No    Scheduled for follow up? Yes    I have advised the patient to call PCP with any new or worsening symptoms  Aby Parnell - /MA            Subjective      HPI     Admitted -  Pancreatitis, gallstone, gallbladder removed       Right wrist pain  Review of Systems    Current Outpatient Medications on File Prior to Visit   Medication Sig   • albuterol (PROVENTIL HFA,VENTOLIN HFA) 90 mcg/act inhaler Inhale 2 puffs every 4 (four) hours as needed for wheezing   • Azelastine HCl 137 MCG/SPRAY SOLN 1 spray into each nostril 2 (two) times a day   • budesonide (RINOCORT AQUA) 32 MCG/ACT nasal spray 1 spray into each nostril daily   • docusate sodium (COLACE) 100 mg capsule Take 1 capsule (100 mg total) by mouth 2 (two) times a day   • Fluticasone-Salmeterol (Advair Diskus) 250-50 mcg/dose inhaler Inhale 1 puff daily Rinse mouth after use     • Polyethylene Glycol 3350 (MIRALAX PO) Take by mouth   • Prenatal MV-Min-Fe Fum-FA-DHA (PRENATAL+DHA PO) Take by mouth   • Blood Glucose Monitoring Suppl (OneTouch Verio Flex System) w/Device KIT Test 4 times daily       Objective     /68 (BP Location: Left arm, Patient Position: Sitting, Cuff Size: Adult)   Pulse 74   Temp 97 5 °F (36 4 °C) (Probe)   Ht 5' 3" (1 6 m)   Wt 93 1 kg (205 lb 3 2 oz)   SpO2 97%   BMI 36 35 kg/m²     Physical Exam  Hayes Baez MD

## 2023-03-07 ENCOUNTER — OFFICE VISIT (OUTPATIENT)
Dept: SURGERY | Facility: CLINIC | Age: 33
End: 2023-03-07

## 2023-03-07 DIAGNOSIS — K85.90 ACUTE PANCREATITIS, UNSPECIFIED COMPLICATION STATUS, UNSPECIFIED PANCREATITIS TYPE: Primary | ICD-10-CM

## 2023-03-07 NOTE — PROGRESS NOTES
Assessment/Plan: Patient is status post laparoscopic cholecystectomy  Feels well  She offers no complaints  She has increased her diet and activity nicely  All questions answered  There are no diagnoses linked to this encounter  Pathology: Reviewed with patient, all questions answered  Postoperative restrictions reviewed  All questions answered  ______________________________________________________  HPI: Presents for post operative visit  Alyssa Dumont 21  Doing well  ROS:  General ROS: negative for - chills, fatigue, fever or night sweats, weight loss  Respiratory ROS: no cough, shortness of breath, or wheezing  Cardiovascular ROS: no chest pain or dyspnea on exertion  Genito-Urinary ROS: no dysuria, trouble voiding, or hematuria  Musculoskeletal ROS: negative for - gait disturbance, joint pain or muscle pain  Neurological ROS: no TIA or stroke symptoms  GI ROS: see HPI  Skin ROS: no new rashes or lesions   Lymphatic ROS: no new adenopathy noted by pt     GYN ROS: see HPI, no new GYN history or bleeding noted  Psy ROS: no new mental or behavioral disturbances         Patient Active Problem List   Diagnosis   • Eczema   • Allergic rhinitis   • Left ovarian cyst   • Absolute anemia   • Status post primary low transverse  section   • History of insulin controlled gestational diabetes mellitus   • Postpartum care following  delivery   • Pancreatitis   • Leukocytosis   • Elevated LFTs       Allergies:  Aspirin and Shellfish allergy - food allergy      Current Outpatient Medications:   •  albuterol (PROVENTIL HFA,VENTOLIN HFA) 90 mcg/act inhaler, Inhale 2 puffs every 4 (four) hours as needed for wheezing, Disp: 8 5 g, Rfl: 0  •  Azelastine HCl 137 MCG/SPRAY SOLN, 1 spray into each nostril 2 (two) times a day, Disp: 30 mL, Rfl: 0  •  Blood Glucose Monitoring Suppl (OneTouch Verio Flex System) w/Device KIT, Test 4 times daily, Disp: 1 kit, Rfl: 0  •  budesonide (RINOCORT AQUA) 32 MCG/ACT nasal spray, 1 spray into each nostril daily, Disp: , Rfl:   •  docusate sodium (COLACE) 100 mg capsule, Take 1 capsule (100 mg total) by mouth 2 (two) times a day, Disp: , Rfl: 0  •  Fluticasone-Salmeterol (Advair Diskus) 250-50 mcg/dose inhaler, Inhale 1 puff daily Rinse mouth after use , Disp: 60 blister, Rfl: 0  •  Polyethylene Glycol 3350 (MIRALAX PO), Take by mouth, Disp: , Rfl:   •  Prenatal MV-Min-Fe Fum-FA-DHA (PRENATAL+DHA PO), Take by mouth, Disp: , Rfl:     Past Medical History:   Diagnosis Date   • Allergic    • Asthma    • Fibroadenoma of left breast        Past Surgical History:   Procedure Laterality Date   • CHOLECYSTECTOMY LAPAROSCOPIC N/A 2023    Procedure: CHOLECYSTECTOMY LAPAROSCOPIC W/ INTRAOP CHOLANGIOGRAM;  Surgeon: Carlos Norris MD;  Location: [facility];  Service: General   • CYST REMOVAL N/A     low back   • [facility]  DELIVERY ONLY N/A 2023    Procedure:  SECTION (); Surgeon: Sterling Vela MD;  Location: [facility];  Service: Obstetrics   • WISDOM TOOTH EXTRACTION         Family History   Problem Relation Age of Onset   • Hypertension Mother    • Arthritis Mother    • Hypothyroidism Mother    • Hypertension Father    • Hypothyroidism Sister    • Hypothyroidism Sister    • Arthritis Maternal Grandmother    • Cancer Maternal Grandfather    • Stroke Paternal Grandmother    • No Known Problems Paternal Grandfather         reports that she has never smoked  She has never used smokeless tobacco  She reports that she does not currently use alcohol  She reports that she does not currently use drugs  PHYSICAL EXAM    There were no vitals taken for this visit      General: normal, cooperative, no distress  Abdominal: soft, nondistended or nontender  Incision: clean, dry, and intact and healing well      Carlos Norris MD    Date: 3/7/2023 Time: 12:15 PM

## 2023-03-13 ENCOUNTER — ANNUAL EXAM (OUTPATIENT)
Dept: OBGYN CLINIC | Facility: CLINIC | Age: 33
End: 2023-03-13

## 2023-03-13 VITALS
HEIGHT: 63 IN | SYSTOLIC BLOOD PRESSURE: 100 MMHG | WEIGHT: 207.4 LBS | BODY MASS INDEX: 36.75 KG/M2 | DIASTOLIC BLOOD PRESSURE: 66 MMHG

## 2023-03-13 DIAGNOSIS — N83.202 LEFT OVARIAN CYST: ICD-10-CM

## 2023-03-13 DIAGNOSIS — Z86.32 HISTORY OF INSULIN CONTROLLED GESTATIONAL DIABETES MELLITUS: ICD-10-CM

## 2023-03-13 DIAGNOSIS — Z30.09 ENCOUNTER FOR COUNSELING REGARDING CONTRACEPTION: ICD-10-CM

## 2023-03-13 DIAGNOSIS — Z01.419 ENCOUNTER FOR ANNUAL ROUTINE GYNECOLOGICAL EXAMINATION: Primary | ICD-10-CM

## 2023-03-13 DIAGNOSIS — Z12.4 CERVICAL CANCER SCREENING: ICD-10-CM

## 2023-03-13 PROBLEM — D64.9 ABSOLUTE ANEMIA: Status: RESOLVED | Noted: 2022-12-07 | Resolved: 2023-03-13

## 2023-03-13 NOTE — PROGRESS NOTES
Subjective:        Arnav Mena is a 27 y o  female  Here for Gynecologic Exam (Pap 11/22/19 neg/neg/BCM - None (Not interested)/01/24/23 37w0d  2320 g (5 lb 1 8 oz) F CS-LTranv /Lmp - patient did not get a period since delivery/Patient stopped breast feeding maybe 3 weeks ago/Patient is not sexually active yet/EPDs 2)      GYN HPI  Here for annual gyn exam  Menstrual cycle:  Menstrual patttern: no menses since delivery  just stopped breastfeeding a few weeks ago  Vaginal c/o: tan d/c  Urinary c/o: denies  Breast complaints:denies  She does not do self breast Exams    Sexually active: not since childbirth  Contraception: not presently  She reports she feels safe at home  The following portions of the patient's history were reviewed and updated as appropriate: allergies, current medications, past family history, past medical history, past social history, past surgical history, and problem list     Hereditary Cancer Screening  Cancer-related family history includes Cancer in her maternal grandfather  There is no history of Breast cancer, Ovarian cancer, Cervical cancer, or Uterine cancer  Substance Abuse Screening Completed  See hx and flowsheet  Screens Positive for none         HEALTH MAINTENANCE SCREENINGS:    History of abnormal pap: No, Last Papanicolaou test:  Not on file    Review of Systems   Constitutional: Negative for appetite change, chills, fatigue, fever and unexpected weight change  HENT: Negative  Eyes: Negative  Respiratory: Negative for chest tightness and shortness of breath  Cardiovascular: Negative for chest pain and palpitations  Gastrointestinal: Negative for abdominal pain, constipation and vomiting  Endocrine: Negative for cold intolerance and heat intolerance  Genitourinary:        As per HPI   Musculoskeletal: Negative for back pain, joint swelling and neck pain  Skin: Negative for color change and rash     Neurological: Negative for dizziness, weakness and numbness  Hematological: Does not bruise/bleed easily  Psychiatric/Behavioral: Negative  Objective:  /66 (BP Location: Right arm, Patient Position: Sitting)   Ht 5' 3" (1 6 m)   Wt 94 1 kg (207 lb 6 4 oz)   BMI 36 74 kg/m²        Physical Exam  Constitutional:       General: She is not in acute distress  Appearance: Normal appearance  Genitourinary:      Vulva and rectum normal       No lesions in the vagina  Right Labia: No rash or lesions  Left Labia: No lesions or rash  No vaginal discharge, erythema, tenderness or bleeding  Right Adnexa: not tender and no mass present  Left Adnexa: not tender and no mass present  No cervical motion tenderness, discharge or friability  Uterus is not enlarged or tender  No urethral prolapse present  Pelvic exam was performed with patient in the lithotomy position  Breasts:     Breasts are symmetrical       Right: No inverted nipple, mass, nipple discharge, skin change or tenderness  Left: No inverted nipple, mass, nipple discharge, skin change or tenderness  HENT:      Head: Normocephalic and atraumatic  Cardiovascular:      Rate and Rhythm: Normal rate  Heart sounds: No murmur heard  Pulmonary:      Effort: Pulmonary effort is normal       Breath sounds: Normal breath sounds  Abdominal:      General: There is no distension  Palpations: Abdomen is soft  Tenderness: There is no abdominal tenderness  Musculoskeletal:         General: Normal range of motion  Cervical back: Normal range of motion  Lymphadenopathy:      Cervical: No cervical adenopathy  Neurological:      Mental Status: She is alert and oriented to person, place, and time  Skin:     General: Skin is warm and dry  Psychiatric:         Mood and Affect: Mood normal          Behavior: Behavior normal    Vitals reviewed                 Assessment/Plan:           Annette Palmer- Primary  Annual GYN examination completed today  Risk prevention and anticipatory guidance provided including:  • Risk for hereditary cancers: Family history reviewed and patient does not qualify for referral for genetics consult/testing  Referral to genetics oncology offered as indicated  • Calcium and vitamin D supplementation  • Dietary and lifestyle recommendations based on her age and weight  body mass index is 36 74 kg/m²       • Tobacco and alcohol use, intervention ordered if applicable  Cervical cancer screening  Previous pap smears and ASCCP screening guidelines have been reviewed  Pap smear is indicated at this time  Contraception   Contraceptive options were reviewed, including hormonal methods, both combination (pill, patch, vaginal ring) and progesterone-only (pill, Depo Provera and Nexplanon), intrauterine devices (Mirena/Liletta, Bonita and Paragard), barrier methods (condoms, diaphragm) and male/female sterilization  The mechanisms, risks, benefits and side effects of all methods were discussed  All questions have been answered to her satisfaction  Patient chooses patches  STI Screening  STI screening is declined  STI prevention discussed with use of condoms  Breast exam and breast cancer screening  Breast exam was done; , breast cancer imaging/screening is not indicated at this time  Problem List Items Addressed This Visit     History of insulin controlled gestational diabetes mellitus     Encouraged to complete 2 hour gtt         Left ovarian cyst     Previous notation of calcifications in left ovary, possible dermoid  F/u US ordered            Relevant Orders    US pelvis complete w transvaginal   Other Visit Diagnoses     Encounter for annual routine gynecological examination    -  Primary    Cervical cancer screening        Encounter for counseling regarding contraception        Relevant Medications    norelgestromin-ethinyl estradiol (ORTHO EVRA) 150-35 MCG/24HR          Orders Placed This Encounter   Procedures   • US pelvis complete w transvaginal

## 2023-03-15 LAB
HPV HR 12 DNA CVX QL NAA+PROBE: ABNORMAL
HPV16 DNA CVX QL NAA+PROBE: ABNORMAL
HPV18 DNA CVX QL NAA+PROBE: ABNORMAL

## 2023-03-17 LAB
LAB AP GYN PRIMARY INTERPRETATION: NORMAL
Lab: NORMAL

## 2023-03-21 ENCOUNTER — TELEPHONE (OUTPATIENT)
Dept: OBGYN CLINIC | Facility: CLINIC | Age: 33
End: 2023-03-21

## 2023-03-21 DIAGNOSIS — J45.20 MILD INTERMITTENT ASTHMA WITHOUT COMPLICATION: ICD-10-CM

## 2023-03-21 RX ORDER — AZELASTINE HYDROCHLORIDE 137 UG/1
1 SPRAY, METERED NASAL 2 TIMES DAILY
Qty: 30 ML | Refills: 0 | Status: SHIPPED | OUTPATIENT
Start: 2023-03-21

## 2023-03-21 RX ORDER — ALBUTEROL SULFATE 90 UG/1
2 AEROSOL, METERED RESPIRATORY (INHALATION) EVERY 4 HOURS PRN
Qty: 8.5 G | Refills: 0 | Status: SHIPPED | OUTPATIENT
Start: 2023-03-21

## 2023-03-21 RX ORDER — FLUTICASONE PROPIONATE AND SALMETEROL 250; 50 UG/1; UG/1
1 POWDER RESPIRATORY (INHALATION) DAILY
Qty: 60 BLISTER | Refills: 0 | Status: SHIPPED | OUTPATIENT
Start: 2023-03-21

## 2023-03-21 NOTE — TELEPHONE ENCOUNTER
----- Message from Miri Tinsley sent at 3/18/2023  7:37 AM EDT -----  Pap is normal/negative  HPV could not be run   Will need pap again in 3 years

## 2023-03-22 ENCOUNTER — APPOINTMENT (OUTPATIENT)
Dept: LAB | Facility: CLINIC | Age: 33
End: 2023-03-22

## 2023-03-22 DIAGNOSIS — D50.8 OTHER IRON DEFICIENCY ANEMIA: ICD-10-CM

## 2023-03-22 DIAGNOSIS — O99.013 ANEMIA AFFECTING PREGNANCY IN THIRD TRIMESTER: ICD-10-CM

## 2023-03-22 DIAGNOSIS — R79.89 ELEVATED LFTS: ICD-10-CM

## 2023-03-22 DIAGNOSIS — O24.419 GESTATIONAL DIABETES MELLITUS (GDM), ANTEPARTUM, GESTATIONAL DIABETES METHOD OF CONTROL UNSPECIFIED: ICD-10-CM

## 2023-03-22 DIAGNOSIS — Z13.1 DIABETES MELLITUS SCREENING: ICD-10-CM

## 2023-03-22 LAB
ALBUMIN SERPL BCP-MCNC: 4.1 G/DL (ref 3.5–5)
ALP SERPL-CCNC: 78 U/L (ref 34–104)
ALT SERPL W P-5'-P-CCNC: 10 U/L (ref 7–52)
ANION GAP SERPL CALCULATED.3IONS-SCNC: 8 MMOL/L (ref 4–13)
AST SERPL W P-5'-P-CCNC: 11 U/L (ref 13–39)
BASOPHILS # BLD AUTO: 0.04 THOUSANDS/ÂΜL (ref 0–0.1)
BASOPHILS NFR BLD AUTO: 1 % (ref 0–1)
BILIRUB SERPL-MCNC: 0.69 MG/DL (ref 0.2–1)
BUN SERPL-MCNC: 7 MG/DL (ref 5–25)
CALCIUM SERPL-MCNC: 9 MG/DL (ref 8.4–10.2)
CHLORIDE SERPL-SCNC: 105 MMOL/L (ref 96–108)
CHOLEST SERPL-MCNC: 143 MG/DL
CO2 SERPL-SCNC: 25 MMOL/L (ref 21–32)
CREAT SERPL-MCNC: 0.79 MG/DL (ref 0.6–1.3)
EOSINOPHIL # BLD AUTO: 0.16 THOUSAND/ÂΜL (ref 0–0.61)
EOSINOPHIL NFR BLD AUTO: 2 % (ref 0–6)
ERYTHROCYTE [DISTWIDTH] IN BLOOD BY AUTOMATED COUNT: 14.1 % (ref 11.6–15.1)
EST. AVERAGE GLUCOSE BLD GHB EST-MCNC: 100 MG/DL
GFR SERPL CREATININE-BSD FRML MDRD: 97 ML/MIN/1.73SQ M
GLUCOSE 2H P 75 G GLC PO SERPL-MCNC: 100 MG/DL (ref 70–105)
GLUCOSE P FAST SERPL-MCNC: 91 MG/DL (ref 65–99)
GLUCOSE P FAST SERPL-MCNC: 91 MG/DL (ref 65–99)
HBA1C MFR BLD: 5.1 %
HCT VFR BLD AUTO: 38.5 % (ref 34.8–46.1)
HDLC SERPL-MCNC: 49 MG/DL
HGB BLD-MCNC: 12.3 G/DL (ref 11.5–15.4)
IMM GRANULOCYTES # BLD AUTO: 0.03 THOUSAND/UL (ref 0–0.2)
IMM GRANULOCYTES NFR BLD AUTO: 0 % (ref 0–2)
LDLC SERPL CALC-MCNC: 81 MG/DL (ref 0–100)
LYMPHOCYTES # BLD AUTO: 2.34 THOUSANDS/ÂΜL (ref 0.6–4.47)
LYMPHOCYTES NFR BLD AUTO: 31 % (ref 14–44)
MCH RBC QN AUTO: 28 PG (ref 26.8–34.3)
MCHC RBC AUTO-ENTMCNC: 31.9 G/DL (ref 31.4–37.4)
MCV RBC AUTO: 88 FL (ref 82–98)
MONOCYTES # BLD AUTO: 0.51 THOUSAND/ÂΜL (ref 0.17–1.22)
MONOCYTES NFR BLD AUTO: 7 % (ref 4–12)
NEUTROPHILS # BLD AUTO: 4.54 THOUSANDS/ÂΜL (ref 1.85–7.62)
NEUTS SEG NFR BLD AUTO: 59 % (ref 43–75)
NONHDLC SERPL-MCNC: 94 MG/DL
NRBC BLD AUTO-RTO: 0 /100 WBCS
PLATELET # BLD AUTO: 354 THOUSANDS/UL (ref 149–390)
PMV BLD AUTO: 8.5 FL (ref 8.9–12.7)
POTASSIUM SERPL-SCNC: 3.8 MMOL/L (ref 3.5–5.3)
PROT SERPL-MCNC: 6.8 G/DL (ref 6.4–8.4)
RBC # BLD AUTO: 4.4 MILLION/UL (ref 3.81–5.12)
SODIUM SERPL-SCNC: 138 MMOL/L (ref 135–147)
TRIGL SERPL-MCNC: 63 MG/DL
WBC # BLD AUTO: 7.62 THOUSAND/UL (ref 4.31–10.16)

## 2023-04-06 ENCOUNTER — HOSPITAL ENCOUNTER (OUTPATIENT)
Dept: ULTRASOUND IMAGING | Facility: HOSPITAL | Age: 33
Discharge: HOME | End: 2023-04-06

## 2023-04-06 DIAGNOSIS — N83.202 LEFT OVARIAN CYST: ICD-10-CM

## 2023-05-13 DIAGNOSIS — J45.20 MILD INTERMITTENT ASTHMA WITHOUT COMPLICATION: ICD-10-CM

## 2023-05-15 RX ORDER — ALBUTEROL SULFATE 90 UG/1
2 AEROSOL, METERED RESPIRATORY (INHALATION) EVERY 4 HOURS PRN
Qty: 8.5 G | Refills: 0 | Status: SHIPPED | OUTPATIENT
Start: 2023-05-15

## 2023-05-15 RX ORDER — AZELASTINE HYDROCHLORIDE 137 UG/1
1 SPRAY, METERED NASAL 2 TIMES DAILY
Qty: 30 ML | Refills: 0 | Status: SHIPPED | OUTPATIENT
Start: 2023-05-15

## 2023-05-15 RX ORDER — FLUTICASONE PROPIONATE AND SALMETEROL 250; 50 UG/1; UG/1
1 POWDER RESPIRATORY (INHALATION) DAILY
Qty: 60 BLISTER | Refills: 0 | Status: SHIPPED | OUTPATIENT
Start: 2023-05-15

## 2023-05-22 DIAGNOSIS — L60.0 INGROWN TOENAIL: Primary | ICD-10-CM

## 2023-05-24 ENCOUNTER — OFFICE VISIT (OUTPATIENT)
Dept: PODIATRY | Facility: CLINIC | Age: 33
End: 2023-05-24

## 2023-05-24 VITALS
HEART RATE: 71 BPM | BODY MASS INDEX: 38.09 KG/M2 | DIASTOLIC BLOOD PRESSURE: 69 MMHG | SYSTOLIC BLOOD PRESSURE: 101 MMHG | WEIGHT: 215 LBS | HEIGHT: 63 IN

## 2023-05-24 DIAGNOSIS — L60.0 INGROWN TOENAIL: Primary | ICD-10-CM

## 2023-05-24 RX ORDER — LIDOCAINE HYDROCHLORIDE 10 MG/ML
5 INJECTION, SOLUTION INFILTRATION; PERINEURAL ONCE
Status: COMPLETED | OUTPATIENT
Start: 2023-05-24 | End: 2023-05-24

## 2023-05-24 RX ADMIN — LIDOCAINE HYDROCHLORIDE 5 ML: 10 INJECTION, SOLUTION INFILTRATION; PERINEURAL at 11:53

## 2023-05-24 NOTE — PROGRESS NOTES
"Assessment/Plan:         Diagnoses and all orders for this visit:    Ingrown toenail  -     Ambulatory Referral to Podiatry  -     lidocaine (XYLOCAINE) 1 % injection 5 mL  -     Nail removal      Diagnosis and options discussed with patient  Patient agreeable to the plan as stated below  Postop instructions given  See procedure below for temporary nail avulsion right lateral great toe  Nail removal    Date/Time: 5/24/2023 11:00 AM    Performed by: Lucio Ruiz DPM  Authorized by: Lucio Ruiz DPM    Patient location:  [facility] Protocol:  Consent: Verbal consent obtained  Risks and benefits: risks, benefits and alternatives were discussed  Consent given by: patient  Time out: Immediately prior to procedure a \"time out\" was called to verify the correct patient, procedure, equipment, support staff and site/side marked as required  Timeout called at: 5/24/2023 11:39 AM   Patient understanding: patient states understanding of the procedure being performed      Location:     Foot:  R big toe  Pre-procedure details:     Skin preparation:  Betadine  Anesthesia (see MAR for exact dosages): Anesthesia method:  Local infiltration    Local anesthetic:  Lidocaine 1% w/o epi  Nail Removal:     Nail removed:  Partial    Nail side:  Lateral  Ingrown nail:     Nail matrix removed or ablated:  None  Post-procedure details:     Dressing:  4x4 sterile gauze, antibiotic ointment and gauze roll    Patient tolerance of procedure: Tolerated well, no immediate complications          Subjective:      Patient ID: Concepcion Magana is a 27 y o  female  Patient presents with an ingrown toenail on the right great toe  IT has no had pus or cellulitis the nail fold is swollen and painful  She had this once as a child         The following portions of the patient's history were reviewed and updated as appropriate: allergies, current medications, past family history, past medical history, past social history, " "past surgical history and problem list     Review of Systems      Objective:      /69 Comment: right arm  Pulse 71   Ht 5' 3\" (1 6 m)   Wt 97 5 kg (215 lb)   BMI 38 09 kg/m²          Physical Exam  Vitals reviewed  Constitutional:       Appearance: She is not ill-appearing or diaphoretic  Cardiovascular:      Pulses:           Dorsalis pedis pulses are 2+ on the right side  Posterior tibial pulses are 2+ on the right side  Musculoskeletal:      Right foot: Normal range of motion  No deformity  Feet:    Feet:      Right foot:      Skin integrity: Skin breakdown and erythema present  Toenail Condition: Right toenails are ingrown  Neurological:      Mental Status: She is alert           " none...

## 2023-06-20 DIAGNOSIS — J45.20 MILD INTERMITTENT ASTHMA WITHOUT COMPLICATION: ICD-10-CM

## 2023-06-20 RX ORDER — AZELASTINE HYDROCHLORIDE 137 UG/1
SPRAY, METERED NASAL
Qty: 30 ML | Refills: 0 | Status: SHIPPED | OUTPATIENT
Start: 2023-06-20

## 2023-06-20 RX ORDER — FLUTICASONE PROPIONATE AND SALMETEROL 50; 250 UG/1; UG/1
POWDER RESPIRATORY (INHALATION)
Qty: 60 BLISTER | Refills: 0 | Status: SHIPPED | OUTPATIENT
Start: 2023-06-20

## 2023-07-23 DIAGNOSIS — J45.20 MILD INTERMITTENT ASTHMA WITHOUT COMPLICATION: ICD-10-CM

## 2023-07-23 RX ORDER — AZELASTINE HYDROCHLORIDE 137 UG/1
SPRAY, METERED NASAL
Qty: 90 ML | Refills: 1 | Status: SHIPPED | OUTPATIENT
Start: 2023-07-23

## 2023-07-27 DIAGNOSIS — J45.20 MILD INTERMITTENT ASTHMA WITHOUT COMPLICATION: ICD-10-CM

## 2023-07-27 RX ORDER — FLUTICASONE PROPIONATE AND SALMETEROL 50; 250 UG/1; UG/1
POWDER RESPIRATORY (INHALATION)
Qty: 60 BLISTER | Refills: 1 | Status: SHIPPED | OUTPATIENT
Start: 2023-07-27

## 2023-09-06 ENCOUNTER — OFFICE VISIT (OUTPATIENT)
Dept: INTERNAL MEDICINE CLINIC | Facility: CLINIC | Age: 33
End: 2023-09-06
Payer: COMMERCIAL

## 2023-09-06 ENCOUNTER — TELEPHONE (OUTPATIENT)
Dept: PSYCHIATRY | Facility: CLINIC | Age: 33
End: 2023-09-06

## 2023-09-06 VITALS
HEIGHT: 63 IN | BODY MASS INDEX: 39.16 KG/M2 | SYSTOLIC BLOOD PRESSURE: 100 MMHG | DIASTOLIC BLOOD PRESSURE: 72 MMHG | HEART RATE: 78 BPM | OXYGEN SATURATION: 96 % | WEIGHT: 221 LBS | RESPIRATION RATE: 16 BRPM

## 2023-09-06 DIAGNOSIS — Z00.00 ANNUAL PHYSICAL EXAM: ICD-10-CM

## 2023-09-06 DIAGNOSIS — E66.09 CLASS 1 OBESITY DUE TO EXCESS CALORIES WITHOUT SERIOUS COMORBIDITY WITH BODY MASS INDEX (BMI) OF 34.0 TO 34.9 IN ADULT: ICD-10-CM

## 2023-09-06 DIAGNOSIS — J45.20 MILD INTERMITTENT ASTHMA WITHOUT COMPLICATION: ICD-10-CM

## 2023-09-06 DIAGNOSIS — F41.9 ANXIETY: Primary | ICD-10-CM

## 2023-09-06 PROBLEM — D72.829 LEUKOCYTOSIS: Status: RESOLVED | Noted: 2023-02-20 | Resolved: 2023-09-06

## 2023-09-06 PROBLEM — R79.89 ELEVATED LFTS: Status: RESOLVED | Noted: 2023-02-20 | Resolved: 2023-09-06

## 2023-09-06 PROBLEM — E66.811 CLASS 1 OBESITY DUE TO EXCESS CALORIES WITH BODY MASS INDEX (BMI) OF 34.0 TO 34.9 IN ADULT: Status: ACTIVE | Noted: 2022-08-07

## 2023-09-06 PROBLEM — K85.90 PANCREATITIS: Status: RESOLVED | Noted: 2023-02-20 | Resolved: 2023-09-06

## 2023-09-06 PROCEDURE — 99214 OFFICE O/P EST MOD 30 MIN: CPT | Performed by: GENERAL ACUTE CARE HOSPITAL

## 2023-09-06 NOTE — ASSESSMENT & PLAN NOTE
Counseling provided on diet and exericse  Discussed low carb, low sugar, low fat diet, plant based diet  Briefly discussed wt loss med and wt management program, she will let me know later if she is interested

## 2023-09-06 NOTE — PROGRESS NOTES
Name: Ulices Rojo      :       MRN: [id number]  Encounter Provider: Lydia Ricks MD  Encounter Date: 2023   [facility]    Assessment & Plan     1. Anxiety  Assessment & Plan:  Refer to psychology for therapy  She will let me know if any changes  monitor    Orders:  -     TSH, 3rd generation with Free T4 reflex; Future  -     Ambulatory Referral to Psychology; Future    2. Annual physical exam  -     Lipid panel; Future  -     Hemoglobin A1C; Future  -     CBC and differential; Future  -     Basic metabolic panel; Future    3. Class 1 obesity due to excess calories without serious comorbidity with body mass index (BMI) of 34.0 to 34.9 in adult  Assessment & Plan:  Counseling provided on diet and exericse  Discussed low carb, low sugar, low fat diet, plant based diet  Briefly discussed wt loss med and wt management program, she will let me know later if she is interested    Orders:  -     TSH, 3rd generation with Free T4 reflex; Future    4. Mild intermittent asthma without complication  Assessment & Plan:  Under control  Continue inhaler           Subjective      HPI   Regular f/u  Anxiety getting worse interested in therapy had it in [facility] in the past  Trying to lose wt    Review of Systems    Current Outpatient Medications on File Prior to Visit   Medication Sig   • Advair Diskus 250-50 MCG/ACT inhaler INHALE 1 PUFF DAILY RINSE MOUTH AFTER USE.    • albuterol (PROVENTIL HFA,VENTOLIN HFA) 90 mcg/act inhaler Inhale 2 puffs every 4 (four) hours as needed for wheezing   • Azelastine HCl 137 MCG/SPRAY SOLN USE 1 SPRAY INTO EACH NOSTRIL TWICE A DAY   • budesonide (RINOCORT AQUA) 32 MCG/ACT nasal spray 1 spray into each nostril daily   • Polyethylene Glycol 3350 (MIRALAX PO) Take by mouth   • Blood Glucose Monitoring Suppl (OneTouch Verio Flex System) w/Device KIT Test 4 times daily   • [DISCONTINUED] docusate sodium (COLACE) 100 mg capsule Take 1 capsule (100 mg total) by mouth 2 (two) times a day (Patient not taking: Reported on 3/13/2023)   • [DISCONTINUED] norelgestromin-ethinyl estradiol (ORTHO EVRA) 150-35 MCG/24HR Place 1 patch on the skin over 7 days once a week For 3 weeks, then leave out  For a week (Patient not taking: Reported on 5/24/2023)   • [DISCONTINUED] Prenatal MV-Min-Fe Fum-FA-DHA (PRENATAL+DHA PO) Take by mouth (Patient not taking: Reported on 5/24/2023)       Objective     /72   Pulse 78   Resp 16   Ht 5' 3" (1.6 m)   Wt 100 kg (221 lb)   SpO2 96%   BMI 39.15 kg/m²     Physical Exam  Robinson Tucker MD

## 2023-09-06 NOTE — TELEPHONE ENCOUNTER
----- Message from Ajith Garza sent at 9/6/2023  1:02 PM EDT -----    ----- Message -----  From: Zandra Pina MD  Sent: 9/6/2023  11:59 AM EDT  To: Ajith Garza    Thank you! This pt is interested in therapy for her anxiety.   ----- Message -----  From: Ajith Garza  Sent: 9/6/2023   8:27 AM EDT  To: Zandra Pina MD    Behavioral health. You can still send all of these to me until my replacement is on site. Felicia Parrisht  ----- Message -----  From: Zandra Pina MD  Sent: 9/6/2023   8:22 AM EDT  To: Cady Madden,   If I have pt who needs referral for therapy in the future, do I still enter referral to behavior health, or to psychology? Thanks!

## 2023-09-25 DIAGNOSIS — J45.20 MILD INTERMITTENT ASTHMA WITHOUT COMPLICATION: ICD-10-CM

## 2023-09-25 RX ORDER — FLUTICASONE PROPIONATE AND SALMETEROL 50; 250 UG/1; UG/1
POWDER RESPIRATORY (INHALATION)
Qty: 60 BLISTER | Refills: 1 | Status: SHIPPED | OUTPATIENT
Start: 2023-09-25

## 2023-11-20 DIAGNOSIS — J45.20 MILD INTERMITTENT ASTHMA WITHOUT COMPLICATION: ICD-10-CM

## 2023-11-20 RX ORDER — FLUTICASONE PROPIONATE AND SALMETEROL 50; 250 UG/1; UG/1
POWDER RESPIRATORY (INHALATION)
Qty: 60 BLISTER | Refills: 1 | Status: SHIPPED | OUTPATIENT
Start: 2023-11-20 | End: 2023-11-22

## 2023-11-22 ENCOUNTER — TELEPHONE (OUTPATIENT)
Dept: INTERNAL MEDICINE CLINIC | Facility: CLINIC | Age: 33
End: 2023-11-22

## 2023-11-22 ENCOUNTER — APPOINTMENT (OUTPATIENT)
Dept: LAB | Facility: CLINIC | Age: 33
End: 2023-11-22
Payer: COMMERCIAL

## 2023-11-22 ENCOUNTER — OFFICE VISIT (OUTPATIENT)
Dept: INTERNAL MEDICINE CLINIC | Facility: CLINIC | Age: 33
End: 2023-11-22
Payer: COMMERCIAL

## 2023-11-22 VITALS
BODY MASS INDEX: 39.19 KG/M2 | WEIGHT: 221.2 LBS | DIASTOLIC BLOOD PRESSURE: 70 MMHG | OXYGEN SATURATION: 99 % | SYSTOLIC BLOOD PRESSURE: 114 MMHG | HEART RATE: 64 BPM | HEIGHT: 63 IN

## 2023-11-22 DIAGNOSIS — F41.9 ANXIETY: ICD-10-CM

## 2023-11-22 DIAGNOSIS — Z00.00 ANNUAL PHYSICAL EXAM: ICD-10-CM

## 2023-11-22 DIAGNOSIS — M25.512 CHRONIC PAIN OF BOTH SHOULDERS: ICD-10-CM

## 2023-11-22 DIAGNOSIS — J45.20 MILD INTERMITTENT ASTHMA WITHOUT COMPLICATION: Primary | ICD-10-CM

## 2023-11-22 DIAGNOSIS — E66.09 CLASS 1 OBESITY DUE TO EXCESS CALORIES WITHOUT SERIOUS COMORBIDITY WITH BODY MASS INDEX (BMI) OF 34.0 TO 34.9 IN ADULT: ICD-10-CM

## 2023-11-22 DIAGNOSIS — G89.29 CHRONIC PAIN OF BOTH SHOULDERS: ICD-10-CM

## 2023-11-22 DIAGNOSIS — G89.29 CHRONIC MIDLINE LOW BACK PAIN WITHOUT SCIATICA: ICD-10-CM

## 2023-11-22 DIAGNOSIS — M54.50 CHRONIC MIDLINE LOW BACK PAIN WITHOUT SCIATICA: ICD-10-CM

## 2023-11-22 DIAGNOSIS — R07.89 CHEST WALL PAIN: ICD-10-CM

## 2023-11-22 DIAGNOSIS — M25.511 CHRONIC PAIN OF BOTH SHOULDERS: ICD-10-CM

## 2023-11-22 DIAGNOSIS — K59.00 CONSTIPATION, UNSPECIFIED CONSTIPATION TYPE: ICD-10-CM

## 2023-11-22 LAB
ANION GAP SERPL CALCULATED.3IONS-SCNC: 8 MMOL/L
BASOPHILS # BLD AUTO: 0.04 THOUSANDS/ÂΜL (ref 0–0.1)
BASOPHILS NFR BLD AUTO: 1 % (ref 0–1)
BUN SERPL-MCNC: 7 MG/DL (ref 5–25)
CALCIUM SERPL-MCNC: 9.2 MG/DL (ref 8.4–10.2)
CHLORIDE SERPL-SCNC: 103 MMOL/L (ref 96–108)
CHOLEST SERPL-MCNC: 147 MG/DL
CO2 SERPL-SCNC: 28 MMOL/L (ref 21–32)
CREAT SERPL-MCNC: 0.7 MG/DL (ref 0.6–1.3)
EOSINOPHIL # BLD AUTO: 0.16 THOUSAND/ÂΜL (ref 0–0.61)
EOSINOPHIL NFR BLD AUTO: 2 % (ref 0–6)
ERYTHROCYTE [DISTWIDTH] IN BLOOD BY AUTOMATED COUNT: 12.9 % (ref 11.6–15.1)
EST. AVERAGE GLUCOSE BLD GHB EST-MCNC: 126 MG/DL
GFR SERPL CREATININE-BSD FRML MDRD: 112 ML/MIN/1.73SQ M
GLUCOSE P FAST SERPL-MCNC: 93 MG/DL (ref 65–99)
HBA1C MFR BLD: 6 %
HCT VFR BLD AUTO: 38.9 % (ref 34.8–46.1)
HDLC SERPL-MCNC: 39 MG/DL
HGB BLD-MCNC: 12.7 G/DL (ref 11.5–15.4)
IMM GRANULOCYTES # BLD AUTO: 0.01 THOUSAND/UL (ref 0–0.2)
IMM GRANULOCYTES NFR BLD AUTO: 0 % (ref 0–2)
LDLC SERPL CALC-MCNC: 94 MG/DL (ref 0–100)
LYMPHOCYTES # BLD AUTO: 2.32 THOUSANDS/ÂΜL (ref 0.6–4.47)
LYMPHOCYTES NFR BLD AUTO: 33 % (ref 14–44)
MCH RBC QN AUTO: 29.2 PG (ref 26.8–34.3)
MCHC RBC AUTO-ENTMCNC: 32.6 G/DL (ref 31.4–37.4)
MCV RBC AUTO: 89 FL (ref 82–98)
MONOCYTES # BLD AUTO: 0.42 THOUSAND/ÂΜL (ref 0.17–1.22)
MONOCYTES NFR BLD AUTO: 6 % (ref 4–12)
NEUTROPHILS # BLD AUTO: 4.16 THOUSANDS/ÂΜL (ref 1.85–7.62)
NEUTS SEG NFR BLD AUTO: 58 % (ref 43–75)
NONHDLC SERPL-MCNC: 108 MG/DL
NRBC BLD AUTO-RTO: 0 /100 WBCS
PLATELET # BLD AUTO: 458 THOUSANDS/UL (ref 149–390)
PMV BLD AUTO: 9.2 FL (ref 8.9–12.7)
POTASSIUM SERPL-SCNC: 3.8 MMOL/L (ref 3.5–5.3)
RBC # BLD AUTO: 4.35 MILLION/UL (ref 3.81–5.12)
SODIUM SERPL-SCNC: 139 MMOL/L (ref 135–147)
TRIGL SERPL-MCNC: 69 MG/DL
TSH SERPL DL<=0.05 MIU/L-ACNC: 2.74 UIU/ML (ref 0.45–4.5)
WBC # BLD AUTO: 7.11 THOUSAND/UL (ref 4.31–10.16)

## 2023-11-22 PROCEDURE — 80061 LIPID PANEL: CPT

## 2023-11-22 PROCEDURE — 83036 HEMOGLOBIN GLYCOSYLATED A1C: CPT

## 2023-11-22 PROCEDURE — 80048 BASIC METABOLIC PNL TOTAL CA: CPT

## 2023-11-22 PROCEDURE — 36415 COLL VENOUS BLD VENIPUNCTURE: CPT

## 2023-11-22 PROCEDURE — 84443 ASSAY THYROID STIM HORMONE: CPT

## 2023-11-22 PROCEDURE — 85025 COMPLETE CBC W/AUTO DIFF WBC: CPT

## 2023-11-22 PROCEDURE — 99214 OFFICE O/P EST MOD 30 MIN: CPT | Performed by: GENERAL ACUTE CARE HOSPITAL

## 2023-11-22 RX ORDER — FLUTICASONE PROPIONATE AND SALMETEROL 100; 50 UG/1; UG/1
1 POWDER RESPIRATORY (INHALATION) 2 TIMES DAILY
Qty: 60 BLISTER | Refills: 0
Start: 2023-11-22

## 2023-11-22 NOTE — PROGRESS NOTES
Name: Chiara Armstrong      :       MRN: [id number]  Encounter Provider: Cesar Almazan MD  Encounter Date: 2023   [facility]    Assessment & Plan     1. Mild intermittent asthma without complication  Assessment & Plan:  Asthma under control  No severe exacerbation over the past year  After pt completes current inhaler, will down-grade dose to advair 100-50. Orders:  -     Fluticasone-Salmeterol (Advair Diskus) 100-50 mcg/dose inhaler; Inhale 1 puff 2 (two) times a day Rinse mouth after use. 2. Constipation, unspecified constipation type    3. Chest wall pain  Assessment & Plan:  Unclear etiology. Could be chest wall pain muscular pain or costochondritis. Patient has history of chest pain in the past went to emergency room with negative workup. Continue to monitor for now    Orders:  -     Ambulatory Referral to Physical Therapy; Future    4. Chronic pain of both shoulders    5. Chronic midline low back pain without sciatica  -     Ambulatory Referral to Physical Therapy; Future           Subjective      HPI  (Started a couple of months ago. Chest to under arm area. Sharp radiating pain that comes and goes but does not come frequently. )     Since last visit, developed pain in chest, from under arm to breast, both sides. Once a week. Last for seconds self-resolves. 8-9/10. Sharp pain with radiation. Chronic back pain, shoulder pain, polyarthralgia. That's chronic worse since pregnancy.        Review of Systems    Current Outpatient Medications on File Prior to Visit   Medication Sig    albuterol (PROVENTIL HFA,VENTOLIN HFA) 90 mcg/act inhaler Inhale 2 puffs every 4 (four) hours as needed for wheezing    Azelastine HCl 137 MCG/SPRAY SOLN USE 1 SPRAY INTO EACH NOSTRIL TWICE A DAY    budesonide (RINOCORT AQUA) 32 MCG/ACT nasal spray 1 spray into each nostril daily    Polyethylene Glycol 3350 (MIRALAX PO) Take by mouth    [DISCONTINUED] Fluticasone-Salmeterol (Advair Diskus) 250-50 mcg/dose inhaler INHALE 1 PUFF DAILY. RINSE MOUTH AFTER USE.     Blood Glucose Monitoring Suppl (OneTouch Verio Flex System) w/Device KIT Test 4 times daily       Objective     /70 (BP Location: Left arm, Patient Position: Sitting, Cuff Size: Large)   Pulse 64   Ht 5' 3" (1.6 m)   Wt 100 kg (221 lb 3.2 oz)   SpO2 99%   BMI 39.18 kg/m²     Physical Exam  Nathaniel Mas MD

## 2023-11-22 NOTE — TELEPHONE ENCOUNTER
LM to CB to see if she can come in today at 2pm with She Gomez (if open). she scheduled herself with Cristian.

## 2023-11-22 NOTE — ASSESSMENT & PLAN NOTE
Asthma under control  No severe exacerbation over the past year  After pt completes current inhaler, will down-grade dose to advair 100-50.

## 2023-11-22 NOTE — ASSESSMENT & PLAN NOTE
Unclear etiology. Could be chest wall pain muscular pain or costochondritis. Patient has history of chest pain in the past went to emergency room with negative workup.   Continue to monitor for now

## 2023-11-27 DIAGNOSIS — R73.03 PREDIABETES: ICD-10-CM

## 2023-11-27 DIAGNOSIS — D75.839 THROMBOCYTHEMIA: Primary | ICD-10-CM

## 2023-12-12 ENCOUNTER — OFFICE VISIT (OUTPATIENT)
Age: 33
End: 2023-12-12
Payer: COMMERCIAL

## 2023-12-12 VITALS
HEIGHT: 63 IN | WEIGHT: 220.4 LBS | SYSTOLIC BLOOD PRESSURE: 122 MMHG | DIASTOLIC BLOOD PRESSURE: 62 MMHG | BODY MASS INDEX: 39.05 KG/M2

## 2023-12-12 DIAGNOSIS — N64.4 BREAST PAIN, LEFT: Primary | ICD-10-CM

## 2023-12-12 PROCEDURE — 99213 OFFICE O/P EST LOW 20 MIN: CPT | Performed by: OBSTETRICS & GYNECOLOGY

## 2023-12-13 ENCOUNTER — EVALUATION (OUTPATIENT)
Dept: PHYSICAL THERAPY | Facility: REHABILITATION | Age: 33
End: 2023-12-13
Payer: COMMERCIAL

## 2023-12-13 DIAGNOSIS — R07.89 CHEST WALL PAIN: ICD-10-CM

## 2023-12-13 DIAGNOSIS — M54.50 CHRONIC MIDLINE LOW BACK PAIN WITHOUT SCIATICA: Primary | ICD-10-CM

## 2023-12-13 DIAGNOSIS — G89.29 CHRONIC MIDLINE LOW BACK PAIN WITHOUT SCIATICA: Primary | ICD-10-CM

## 2023-12-13 PROCEDURE — 97140 MANUAL THERAPY 1/> REGIONS: CPT

## 2023-12-13 PROCEDURE — 97161 PT EVAL LOW COMPLEX 20 MIN: CPT

## 2023-12-13 PROCEDURE — 97110 THERAPEUTIC EXERCISES: CPT

## 2023-12-13 PROCEDURE — 97112 NEUROMUSCULAR REEDUCATION: CPT

## 2023-12-13 NOTE — PROGRESS NOTES
PT Evaluation     Today's date: 2023  Patient name: Chiara Armstrong  :   MRN: [id number]  Referring provider: Cesar Almazan MD  Dx:   Encounter Diagnosis     ICD-10-CM    1. Chronic midline low back pain without sciatica  M54.50 Ambulatory Referral to Physical Therapy    G89.29       2. Chest wall pain  R07.89 Ambulatory Referral to Physical Therapy          Start Time: 0745  Stop Time: 0830  Total time in clinic (min): 45 minutes    Assessment  Assessment details: Chiara Armstrong is a 26 y.o. female presenting with acute exacerbation of chronic LBP for the last 10 months since her baby was born. Primary impairments include LBP pain with functional activities, hip extension and core weakness, lumbar AROM dysfunction, paraspinal motor control dysfunction. Educated pt on anatomy and physiology of diagnosis. Will benefit from skilled PT interventions for community reintegration, ADL management/independence, return to work/sport/hobbies. Provided pt with written home exercise program to be completed daily. Impairments: abnormal coordination, abnormal muscle firing, abnormal muscle tone, abnormal or restricted ROM, activity intolerance, impaired physical strength, lacks appropriate home exercise program, pain with function, poor posture  and poor body mechanics  Functional limitations: bending/stooping, lifting heavy objects, prolonged sitting, carrying baby, funtional transfers  Symptom irritability: moderateUnderstanding of Dx/Px/POC: good   Prognosis: good    Goals    Short Term Goals: In 4 weeks, the patient will:  1. Improve B hip extension strength to at least 4/5 MMT  2. Carry baby for at least 15 minutes without aggravating symptoms  3. Supervision with HEP for self-care    Long Term Goals: In 8 weeks, the patient will:  1. Negative prone instability test  2. FOTO to greater than predicted value  3.  Independent with HEP for self-care      Plan  Patient would benefit from: skilled physical therapy  Planned modality interventions: manual electrical stimulation  Planned therapy interventions: abdominal trunk stabilization, activity modification, balance, balance/weight bearing training, behavior modification, body mechanics training, community reintegration, coordination, fine motor coordination training, flexibility, functional ROM exercises, gait training, graded activity, graded exercise, graded motor, home exercise program, work reintegration, therapeutic training, therapeutic exercise, therapeutic activities, stretching, strengthening, self care, postural training, patient education, neuromuscular re-education, motor coordination training, massage, manual therapy, joint mobilization and ADL training  Frequency: 1x week  Duration in weeks: 8  Plan of Care beginning date: 2023  Plan of Care expiration date: 2024  Treatment plan discussed with: patient      Subjective    Pain Location: Lower thoracic and throughout lumbar region - midline but intermittent pain to lateral hips  Pain Intensity: achey, 7-8/10  GWEN: pain worsened since baby born - had   DOI: 10 months ago  Aggravating Factors: prolonged sitting, floor transfers/STS transfers, lifting heavy objects, moving furniture  Alleviating Factors: massage, Tylenol 2x per day  Living Situation:independent ADLs, difficulty carrying baby  Constitutional S/S: denies paresthesias   Goals: "carrying baby without pain"  PLOF: had baby 10 months ago, currently not working      Objective    Flowsheet Rows      Flowsheet Row Most Recent Value   PT/OT G-Codes    Current Score 57   Projected Score 69             Postural Findings:     Head Position x Protracted   Neutral   Retracted   Scapular Position  Protracted x  Neutral   Retracted   Thoracic Spine  x Inc Kyphosis  Neutral       Lumbar Spine   Inc Lordosis   Neutral x Dec Lordosis   Pelvis   Anterior Tilt  Neutral  x Posterior Tilt   Iliac Crest   L elevated x Neutral R elevated   Feet   Pronated x Neutral   Supinated   Lateral Shift   Right   Left x None      Strength and ROM evaluated B from a regional biomechanical perspective and values relevant to this episode recorded in tables below. ROM:   Joint / Motion  Right  Left    Lumbar Flexion  50%     Lumbar Extension  50%     Lumbar Sidebending  75% 75%   Lumbar rotation 75% 75%         Strength: MMT revealed the following findings. Joint Motion Right Left   Hip Flexion 4-/5 4/5   Hip Abduction 4-/5 4/5   Hip Adduction 4+/5 4+/5   Hip Extension 3+/5 3+/5   Knee Extension 5/5 5/5   Knee Flexion 5/5 5/5   Ankle Plantarflexion 5/5 5/5   Ankle Dorsiflexion 5/5 5/5         Repeated Movements:     Standing Baseline:                                                   DURING MVMT.                      RESPONSE AFTER  Standing Flexion No change No change   Standing Extension No change No change         LE Myotomes:  Nerve root Test Action RIGHT  LEFT   L1-L2 Hip Flexion intact intact   L3 Knee Extension intact intact   L4  Ankle DF intact intact   L5 Great toe Extension intact intact   S1 Ankle PF, ankle eversion, hip extension, knee flexion intact intact      S2 Knee Flexion intact intact      Additional Assessments:  Pain with palpation noted: TTP through L1-S1  Passive intervertebral motion: WFL  Observation:  genu valgum     Special Tests:  Lumbar Specific and Neural Tension  Test / Measure  12/13/2023   Straight Leg raise +   Crossed straight leg raise -   Prone instability test                +       SI Joint Screen: [facility]     Treatment Based Classification: Primary stability     FOTO = 57         Precautions: Asthma    POC expires Unit limit Auth Expiration date PT/OT + Visit Limit?   2/7/24 4 N/A 60     Visit/Unit Tracking  AUTH Status:  Date 12/13        60 visits pcy Used 1         Remaining  59           Pertinent Findings:                                                                                            Test / Measure  12/13/2023   FOTO (Predicted 69) 57   B hip ext MMT 3+/5   Prone instability test +         Manuals 12/13            L/s STM, CPA mobs MM 8' gr III/IV                                                   Neuro Re-Ed             LTRs 10x ea            Clamshells HL 2x10 btb            PPT 10x5"            Pallof press                                                    Ther Ex             HEP review 5'            NuStep             Bridges 2x10            SLR             DL / rack pull                                                    Ther Activity             STS             FSU             LSU             Suitcase carry             Anterior carry             Gait Training                                       Modalities

## 2023-12-18 NOTE — PROGRESS NOTES
"Assessment/Plan:      Breast pain, left  -     Mammo diagnostic left w 3d & cad; Future  -     US breast left limited (diagnostic); Future      Subjective:      Patient ID: Reba Mccarthy is a 33 y.o. female.    Reba presents for problem visit due to breast pain.     She reports the pain can come every few days.  It last seconds, but is sharp and shooting.   Denies skin changes.   Not breastfeeding, denies nipple discharge.  She has not tracked the timing of the occurrences related to her cycle.         The following portions of the patient's history were reviewed and updated as appropriate: allergies, current medications, past family history, and problem list.    Review of Systems      Objective:      /62 (BP Location: Left arm, Patient Position: Sitting, Cuff Size: Standard)   Ht 5' 3\" (1.6 m)   Wt 100 kg (220 lb 6.4 oz)   LMP 11/21/2023 (Within Days)   Breastfeeding No   BMI 39.04 kg/m²          Physical Exam  Vitals and nursing note reviewed.   Chest:   Breasts:     Right: No inverted nipple, mass, nipple discharge or skin change.      Left: Tenderness present. No inverted nipple, mass, nipple discharge or skin change.           "

## 2023-12-20 ENCOUNTER — OFFICE VISIT (OUTPATIENT)
Dept: PHYSICAL THERAPY | Facility: REHABILITATION | Age: 33
End: 2023-12-20
Payer: COMMERCIAL

## 2023-12-20 DIAGNOSIS — G89.29 CHRONIC MIDLINE LOW BACK PAIN WITHOUT SCIATICA: Primary | ICD-10-CM

## 2023-12-20 DIAGNOSIS — M54.50 CHRONIC MIDLINE LOW BACK PAIN WITHOUT SCIATICA: Primary | ICD-10-CM

## 2023-12-20 DIAGNOSIS — R07.89 CHEST WALL PAIN: ICD-10-CM

## 2023-12-20 PROCEDURE — 97110 THERAPEUTIC EXERCISES: CPT

## 2023-12-20 PROCEDURE — 97530 THERAPEUTIC ACTIVITIES: CPT

## 2023-12-20 PROCEDURE — 97112 NEUROMUSCULAR REEDUCATION: CPT

## 2023-12-20 NOTE — PROGRESS NOTES
"Daily Note     Today's date: 2023  Patient name: Reba Mccarthy  : 1990  MRN: [id number]  Referring provider: Aracelis Paniagua MD  Dx:   Encounter Diagnosis     ICD-10-CM    1. Chronic midline low back pain without sciatica  M54.50     G89.29       2. Chest wall pain  R07.89           Start Time: 0745  Stop Time: 0830  Total time in clinic (min): 45 minutes    Subjective: Pt reports slight improvement of lumbar symptoms since initial evaluation last week.       Objective: See treatment diary below      Assessment: Tolerated treatment well. Patient would benefit from continued PT.  Good tolerance to continuation of previously introduced exercises and the implementation of new therapeutic interventions into the POC this visit.  No aggravation of symptoms throughout visit.  Mild tightness and discomfort persists though.  1:1 with Lance Arias DPT entirety of tx.        Plan: Continue per plan of care.      Precautions: Asthma    POC expires Unit limit Auth Expiration date PT/OT + Visit Limit?   24 4 N/A 60     Visit/Unit Tracking  AUTH Status:  Date        60 visits pcy Used 1 2        Remaining  59 58          Pertinent Findings:                                                                                            Test / Measure  2023   FOTO (Predicted 69) 57   B hip ext MMT 3+/5   Prone instability test +         Manuals            L/s STM, CPA mobs MM 8' gr III/IV                                                   Neuro Re-Ed             LTRs 10x ea 20x ea           Clamshells HL 2x10 btb HL 2x10 mtb           PPT 10x5\" 15x5\"           Pallof press  2x10 B dbl otb           Hip add iso squeeze  15x5\"           PB rollout 3 way  10x ea                        Ther Ex             HEP review 5'            NuStep  8' L7           Bridges 2x10 3x10           SLR  2x10 B           DL / rack pull             Unilat rows  2x10 B dbl gtb                                   "   Ther Activity             STS  2x10           FSU             LSU             Suitcase carry  400ft B 10#           Anterior carry             Gait Training                                       Modalities

## 2023-12-27 ENCOUNTER — TELEMEDICINE (OUTPATIENT)
Dept: INTERNAL MEDICINE CLINIC | Facility: CLINIC | Age: 33
End: 2023-12-27
Payer: COMMERCIAL

## 2023-12-27 ENCOUNTER — APPOINTMENT (OUTPATIENT)
Dept: PHYSICAL THERAPY | Facility: REHABILITATION | Age: 33
End: 2023-12-27
Payer: COMMERCIAL

## 2023-12-27 VITALS — WEIGHT: 221 LBS | HEIGHT: 63 IN | BODY MASS INDEX: 39.16 KG/M2

## 2023-12-27 DIAGNOSIS — R68.89 FLU-LIKE SYMPTOMS: Primary | ICD-10-CM

## 2023-12-27 PROCEDURE — 87636 SARSCOV2 & INF A&B AMP PRB: CPT | Performed by: GENERAL ACUTE CARE HOSPITAL

## 2023-12-27 PROCEDURE — 99213 OFFICE O/P EST LOW 20 MIN: CPT | Performed by: GENERAL ACUTE CARE HOSPITAL

## 2023-12-27 NOTE — PROGRESS NOTES
Virtual Regular Visit    Verification of patient location:    Patient is located at Home in the following state in which I hold an active license [place]      Assessment/Plan:    Problem List Items Addressed This Visit       Flu-like symptoms - Primary     Likely viral uri  Will check covid/flu  OTC meds for symptom relief         Relevant Orders    Covid/Flu- Office Collect Normal            Reason for visit is   Chief Complaint   Patient presents with    Sore Throat     Runny nose, phlegm. Started a few days ago. Yesterday was worse.     Virtual Regular Visit          Encounter provider Aracelis Paniagua MD    Provider located at [facility]  [address] [facility] [place] [address]      Recent Visits  No visits were found meeting these conditions.  Showing recent visits within past 7 days and meeting all other requirements  Today's Visits  Date Type Provider Dept   12/27/23 Telemedicine Aracelis Paniagua MD  [facility]   Showing today's visits and meeting all other requirements  Future Appointments  No visits were found meeting these conditions.  Showing future appointments within next 150 days and meeting all other requirements       The patient was identified by name and date of birth. Reba Mccarthy was informed that this is a telemedicine visit and that the visit is being conducted through the Epic Embedded platform. She agrees to proceed..  My office door was closed. No one else was in the room.  She acknowledged consent and understanding of privacy and security of the video platform. The patient has agreed to participate and understands they can discontinue the visit at any time.    Patient is aware this is a billable service.     Subjective  Reba Mccarthy is a 33 y.o. female  .      HPI   Sore Throat (Runny nose, phlegm. Started a few days ago. Yesterday was worse. )    No fever, no SOB, no headache  Some sinus congestion some ear pain  No diarrhea  Sore  "throat  cough    Started 3 days ago    Past Medical History:   Diagnosis Date    Allergic     Asthma     Fibroadenoma of left breast        Past Surgical History:   Procedure Laterality Date    CHOLECYSTECTOMY LAPAROSCOPIC N/A 2023    Procedure: CHOLECYSTECTOMY LAPAROSCOPIC W/ INTRAOP CHOLANGIOGRAM;  Surgeon: Baltazar Wilcox MD;  Location: [facility];  Service: General    CYST REMOVAL N/A     low back    [facility]  DELIVERY ONLY N/A 2023    Procedure:  SECTION ();  Surgeon: Ashley Jones MD;  Location: [facility];  Service: Obstetrics    WISDOM TOOTH EXTRACTION         Current Outpatient Medications   Medication Sig Dispense Refill    albuterol (PROVENTIL HFA,VENTOLIN HFA) 90 mcg/act inhaler Inhale 2 puffs every 4 (four) hours as needed for wheezing 8.5 g 0    Azelastine HCl 137 MCG/SPRAY SOLN USE 1 SPRAY INTO EACH NOSTRIL TWICE A DAY 90 mL 1    budesonide (RINOCORT AQUA) 32 MCG/ACT nasal spray 1 spray into each nostril daily      Fluticasone-Salmeterol (Advair Diskus) 100-50 mcg/dose inhaler Inhale 1 puff 2 (two) times a day Rinse mouth after use. 60 blister 0    Polyethylene Glycol 3350 (MIRALAX PO) Take by mouth       No current facility-administered medications for this visit.        Allergies   Allergen Reactions    Shellfish Allergy - Food Allergy Hives, Itching and Swelling       Review of Systems    Video Exam    Vitals:    23 1002   Weight: 100 kg (221 lb)   Height: 5' 3\" (1.6 m)       Physical Exam     Visit Time  Total Visit Duration: 10        "

## 2023-12-28 LAB
FLUAV RNA RESP QL NAA+PROBE: NEGATIVE
FLUBV RNA RESP QL NAA+PROBE: NEGATIVE
SARS-COV-2 RNA RESP QL NAA+PROBE: NEGATIVE

## 2024-01-03 ENCOUNTER — APPOINTMENT (OUTPATIENT)
Dept: PHYSICAL THERAPY | Facility: REHABILITATION | Age: 34
End: 2024-01-03
Payer: COMMERCIAL

## 2024-01-10 ENCOUNTER — OFFICE VISIT (OUTPATIENT)
Dept: PHYSICAL THERAPY | Facility: REHABILITATION | Age: 34
End: 2024-01-10
Payer: COMMERCIAL

## 2024-01-10 DIAGNOSIS — M54.50 CHRONIC MIDLINE LOW BACK PAIN WITHOUT SCIATICA: ICD-10-CM

## 2024-01-10 DIAGNOSIS — G89.29 CHRONIC MIDLINE LOW BACK PAIN WITHOUT SCIATICA: ICD-10-CM

## 2024-01-10 DIAGNOSIS — R07.89 CHEST WALL PAIN: Primary | ICD-10-CM

## 2024-01-10 PROCEDURE — 97112 NEUROMUSCULAR REEDUCATION: CPT

## 2024-01-10 PROCEDURE — 97530 THERAPEUTIC ACTIVITIES: CPT

## 2024-01-10 PROCEDURE — 97110 THERAPEUTIC EXERCISES: CPT

## 2024-01-10 NOTE — PROGRESS NOTES
"Daily Note     Today's date: 1/10/2024  Patient name: Reba Mccarthy  : 1990  MRN: [id number]  Referring provider: Aracelis Paniagua MD  Dx:   Encounter Diagnosis     ICD-10-CM    1. Chest wall pain  R07.89       2. Chronic midline low back pain without sciatica  M54.50     G89.29           Start Time: 0700  Stop Time: 0742  Total time in clinic (min): 42 minutes    Subjective: Pt reports slight set-back of pain symptoms recently, but she does note an overall improvement of symptoms.        Objective: See treatment diary below      Assessment: Tolerated treatment well. Patient would benefit from continued PT.  Pt able to tolerate slight progression of existing therapeutic interventions.  No aggravation of back pain symptoms during tx session.  Mild fatigue post-session.  VCs provided to complete interventions correctly and safely.  1:1 with Lance Arias DPT for initial 30 minutes of tx session ([id number]) then with Jerson Rubi DPT for remaining 12 minutes of tx session ([id number]).        Plan: Continue per plan of care.      Precautions: Asthma    POC expires Unit limit Auth Expiration date PT/OT + Visit Limit?   24 4 N/A 60     Visit/Unit Tracking  AUTH Status:  Date 1/10      60 visits pcy Used 1       Remaining  59         Pertinent Findings:                                                                                            Test / Measure  2023   FOTO (Predicted 69) 57   B hip ext MMT 3+/5   Prone instability test +         Manuals  1/10   L/s STM, CPA mobs MM 8' gr III/IV                       Neuro Re-Ed      LTRs 10x ea 20x ea 15x ea   Clamshells HL 2x10 btb HL 2x10 mtb During bridge   PPT 10x5\" 15x5\"    Pallof press  2x10 B dbl otb 2x10 B dbl gtb   Hip add iso squeeze  15x5\" 15x5\"   PB rollout 3 way  10x ea 10x ea         Ther Ex      HEP review 5'     NuStep  8' L7 8' L6   Bridges 2x10 3x10 + clams 3x10 mtb   SLR  2x10 B 4 way  15x ea B 1#   DL / rack pull    "   Unilat rows  2x10 B dbl gtb + trunk rot 2x10 B dbl gtb               Ther Activity      STS  2x10 2x10 8#   FSU      LSU      Suitcase carry  400ft B 10# 400ft B 15#   Anterior carry      Gait Training                  Modalities

## 2024-01-17 ENCOUNTER — OFFICE VISIT (OUTPATIENT)
Dept: PHYSICAL THERAPY | Facility: REHABILITATION | Age: 34
End: 2024-01-17
Payer: COMMERCIAL

## 2024-01-17 DIAGNOSIS — G89.29 CHRONIC MIDLINE LOW BACK PAIN WITHOUT SCIATICA: Primary | ICD-10-CM

## 2024-01-17 DIAGNOSIS — R07.89 CHEST WALL PAIN: ICD-10-CM

## 2024-01-17 DIAGNOSIS — M54.50 CHRONIC MIDLINE LOW BACK PAIN WITHOUT SCIATICA: Primary | ICD-10-CM

## 2024-01-17 PROCEDURE — 97110 THERAPEUTIC EXERCISES: CPT

## 2024-01-17 PROCEDURE — 97530 THERAPEUTIC ACTIVITIES: CPT

## 2024-01-17 PROCEDURE — 97112 NEUROMUSCULAR REEDUCATION: CPT

## 2024-01-17 NOTE — PROGRESS NOTES
"Daily Note     Today's date: 2024  Patient name: Reba Mccarthy  : 1990  MRN: [id number]  Referring provider: Aracelis Paniagua MD  Dx:   Encounter Diagnosis     ICD-10-CM    1. Chronic midline low back pain without sciatica  M54.50     G89.29       2. Chest wall pain  R07.89           Start Time: 0700  Stop Time: 0739  Total time in clinic (min): 39 minutes    Subjective: Pt reports gradual improvement of symptoms.  States that pain is worse in the evenings and negatively impacts sleeping.        Objective: See treatment diary below      Assessment: Tolerated treatment well. Patient would benefit from continued PT.  Good tolerance to progression of planned therapeutic interventions this visit.  Mild fatigue noted post-session.  1:1 with Lance Arias DPT entirety of tx.        Plan: Continue per plan of care.      Precautions: Asthma    POC expires Unit limit Auth Expiration date PT/OT + Visit Limit?   24 4 N/A 60     Visit/Unit Tracking  AUTH Status:  Date 1/10 1/17     60 visits pcy Used 1 2      Remaining  59 58        Pertinent Findings:                                                                                            Test / Measure  2023   FOTO (Predicted 69) 57 83   B hip ext MMT 3+/5    Prone instability test +          Manuals 12/13 12/20 1/10 1/17   L/s STM, CPA mobs MM 8' gr III/IV                           Neuro Re-Ed       LTRs 10x ea 20x ea 15x ea    Clamshells HL 2x10 btb HL 2x10 mtb During bridge    PPT 10x5\" 15x5\"     Pallof press  2x10 B dbl otb 2x10 B dbl gtb Circles 2x15 B dbl gtb   Hip add iso squeeze  15x5\" 15x5\"    PB rollout 3 way  10x ea 10x ea    [place] diag lift    2x10 B 10#   [place] diag chop    2x10 B 14#   TB sidestepping    4 laps gtb   Ther Ex       HEP review 5'      NuStep  8' L7 8' L6 8' L7   Bridges 2x10 3x10 + clams 3x10 mtb    SLR  2x10 B 4 way  15x ea B 1#    DL / rack pull    3x8 15#   Unilat rows  2x10 B dbl gtb + trunk rot 2x10 B " "dbl gtb                  Ther Activity       STS  2x10 2x10 8# 2x10 10#   FSU       LSU    15x B 9\"   Suitcase carry  400ft B 10# 400ft B 15# 300ft B 20#   Anterior carry       Gait Training                     Modalities                              "

## 2024-01-24 ENCOUNTER — OFFICE VISIT (OUTPATIENT)
Dept: PHYSICAL THERAPY | Facility: REHABILITATION | Age: 34
End: 2024-01-24
Payer: COMMERCIAL

## 2024-01-24 DIAGNOSIS — G89.29 CHRONIC MIDLINE LOW BACK PAIN WITHOUT SCIATICA: Primary | ICD-10-CM

## 2024-01-24 DIAGNOSIS — M54.50 CHRONIC MIDLINE LOW BACK PAIN WITHOUT SCIATICA: Primary | ICD-10-CM

## 2024-01-24 DIAGNOSIS — R07.89 CHEST WALL PAIN: ICD-10-CM

## 2024-01-24 PROCEDURE — 97112 NEUROMUSCULAR REEDUCATION: CPT

## 2024-01-24 PROCEDURE — 97110 THERAPEUTIC EXERCISES: CPT

## 2024-01-24 PROCEDURE — 97530 THERAPEUTIC ACTIVITIES: CPT

## 2024-01-24 NOTE — PROGRESS NOTES
"Daily Note     Today's date: 2024  Patient name: Reba Mccarthy  : 1990  MRN: [id number]  Referring provider: Aracelis Paniagua MD  Dx:   Encounter Diagnosis     ICD-10-CM    1. Chronic midline low back pain without sciatica  M54.50     G89.29       2. Chest wall pain  R07.89           Start Time: 0700  Stop Time: 0755  Total time in clinic (min): 55 minutes    Subjective: Pt reports slight aggravation of lumbar symptoms due to increase in activity at home.  States that she is busy since family is over for the week getting ready for a birthday party.        Objective: See treatment diary below      Assessment: Tolerated treatment well. Patient would benefit from continued PT.  Pt able to tolerate continued progression of planned therapeutic interventions this visit.  Mild fatigue post-session.  Minimal VC's provided with introduction of new exercises.  Posterior chain motor control is improving but remains slightly limited at this time.  1:1 with Lance Arias DPT entirety of tx.        Plan: Continue per plan of care.      Precautions: Asthma    POC expires Unit limit Auth Expiration date PT/OT + Visit Limit?   24 4 N/A 60     Visit/Unit Tracking  AUTH Status:  Date 1/10 1/17 1/24    60 visits pcy Used 1 2 3     Remaining  59 58 57       Pertinent Findings:                                                                                            Test / Measure  2023   FOTO (Predicted 69) 57 83   B hip ext MMT 3+/5    Prone instability test +          Manuals 12/13 12/20 1/10 1/17 1/24   L/s STM, CPA mobs MM 8' gr III/IV                               Neuro Re-Ed        LTRs 10x ea 20x ea 15x ea     Clamshells HL 2x10 btb HL 2x10 mtb During bridge     PPT 10x5\" 15x5\"      Pallof press  2x10 B dbl otb 2x10 B dbl gtb Circles 2x15 B dbl gtb Circles 2x15 B dbl gtb   Hip add iso squeeze  15x5\" 15x5\"     PB rollout 3 way  10x ea 10x ea     [place] diag lift    2x10 B 10# 2x10 B 10# " "  [place] diag chop    2x10 B 14# 2x10 B 15#   TB sidestepping    4 laps gtb    Runner's step up + Sh ext     15x alt LE 9\" step + btb   Deadbugs     15x ea alt w/ PB   Birddogs     2x10 ea alt   Ther Ex        HEP review 5'       NuStep  8' L7 8' L6 8' L7 8' L7   Bridges 2x10 3x10 + clams 3x10 mtb  3x10 15#   SLR  2x10 B 4 way  15x ea B 1#     DL / rack pull    3x8 15# 3x8 20#   Unilat rows  2x10 B dbl gtb + trunk rot 2x10 B dbl gtb     Reverse hypers     2x10 alt LE           Ther Activity        STS  2x10 2x10 8# 2x10 10# 2x10 12#   FSU        LSU    15x B 9\"    Suitcase carry  400ft B 10# 400ft B 15# 300ft B 20# 300ft B 25#   Anterior carry        Gait Training                        Modalities                                   "

## 2024-02-05 DIAGNOSIS — J45.20 MILD INTERMITTENT ASTHMA WITHOUT COMPLICATION: ICD-10-CM

## 2024-02-06 RX ORDER — AZELASTINE HYDROCHLORIDE 137 UG/1
SPRAY, METERED NASAL
Qty: 90 ML | Refills: 0 | Status: SHIPPED | OUTPATIENT
Start: 2024-02-06

## 2024-02-06 RX ORDER — FLUTICASONE PROPIONATE AND SALMETEROL 100; 50 UG/1; UG/1
1 POWDER RESPIRATORY (INHALATION) 2 TIMES DAILY
Qty: 60 BLISTER | Refills: 0 | Status: SHIPPED | OUTPATIENT
Start: 2024-02-06

## 2024-02-06 RX ORDER — ALBUTEROL SULFATE 90 UG/1
2 AEROSOL, METERED RESPIRATORY (INHALATION) EVERY 4 HOURS PRN
Qty: 8.5 G | Refills: 0 | Status: SHIPPED | OUTPATIENT
Start: 2024-02-06

## 2024-02-07 ENCOUNTER — APPOINTMENT (OUTPATIENT)
Dept: PHYSICAL THERAPY | Facility: REHABILITATION | Age: 34
End: 2024-02-07
Payer: COMMERCIAL

## 2024-02-08 ENCOUNTER — OFFICE VISIT (OUTPATIENT)
Dept: PHYSICAL THERAPY | Facility: REHABILITATION | Age: 34
End: 2024-02-08
Payer: COMMERCIAL

## 2024-02-08 DIAGNOSIS — G89.29 CHRONIC MIDLINE LOW BACK PAIN WITHOUT SCIATICA: Primary | ICD-10-CM

## 2024-02-08 DIAGNOSIS — R07.89 CHEST WALL PAIN: ICD-10-CM

## 2024-02-08 DIAGNOSIS — M54.50 CHRONIC MIDLINE LOW BACK PAIN WITHOUT SCIATICA: Primary | ICD-10-CM

## 2024-02-08 PROCEDURE — 97110 THERAPEUTIC EXERCISES: CPT

## 2024-02-08 PROCEDURE — 97112 NEUROMUSCULAR REEDUCATION: CPT

## 2024-02-08 PROCEDURE — 97530 THERAPEUTIC ACTIVITIES: CPT

## 2024-02-08 NOTE — PROGRESS NOTES
Re-evaluation    Today's date: 2024  Patient name: Reba Mccarthy  : 1990  MRN: [id number]  Referring provider: Aracelis Paniagua MD  Dx:   Encounter Diagnosis     ICD-10-CM    1. Chronic midline low back pain without sciatica  M54.50     G89.29       2. Chest wall pain  R07.89                      Subjective: Patient reports she is feeling much better overall.  Reports improved strength and mobility.  Denies any pain today and reports improved endurance with home activities.       Objective: See treatment diary below      Assessment  Assessment details: Reba Mccarthy is a 33 y.o. female presenting with acute exacerbation of chronic LBP for the last 10 months since her baby was born.  Primary impairments include LBP pain with functional activities, hip extension and core weakness, lumbar AROM dysfunction, paraspinal motor control dysfunction.  Educated pt on anatomy and physiology of diagnosis.  Will benefit from skilled PT interventions for community reintegration, ADL management/independence, return to work/sport/hobbies.  Provided pt with written home exercise program to be completed daily.     Impairments: abnormal coordination, abnormal muscle firing, abnormal muscle tone, abnormal or restricted ROM, activity intolerance, impaired physical strength, lacks appropriate home exercise program, pain with function, poor posture  and poor body mechanics  Functional limitations: bending/stooping, lifting heavy objects, prolonged sitting, carrying baby, funtional transfers  Symptom irritability: moderateUnderstanding of Dx/Px/POC: good   Prognosis: good       Short Term Goals:  In 4 weeks, the patient will:  1. Improve B hip extension strength to at least 4/5 MMT  goal met  2. Carry baby for at least 15 minutes without aggravating symptoms on going  3. Supervision with [facility] for self-care  progressing     Long Term Goals:  In 8 weeks, the patient will:  1. Negative prone instability test  goal  met  2. FOTO to greater than predicted value  ongoing  3. Independent with HEP for self-care progressing          ROM:   Joint / Motion  Right  Left    Lumbar Flexion  50%     Lumbar Extension  50%     Lumbar Sidebending  75% 75%   Lumbar rotation 75% 75%         Strength: MMT revealed the following findings.  Joint Motion Right Left   Hip Flexion 4/5 4/5   Hip Abduction 4/5 4/5   Hip Adduction 4+/5 4+/5   Hip Extension 4//5 4/5   Knee Extension 5/5 5/5   Knee Flexion 5/5 5/5   Ankle Plantarflexion 5/5 5/5   Ankle Dorsiflexion 5/5 5/5          Plan  Patient would benefit from: skilled physical therapy  Planned modality interventions: manual electrical stimulation  Planned therapy interventions: abdominal trunk stabilization, activity modification, balance, balance/weight bearing training, behavior modification, body mechanics training, community reintegration, coordination, fine motor coordination training, flexibility, functional ROM exercises, gait training, graded activity, graded exercise, graded motor, home exercise program, work reintegration, therapeutic training, therapeutic exercise, therapeutic activities, stretching, strengthening, self care, postural training, patient education, neuromuscular re-education, motor coordination training, massage, manual therapy, joint mobilization and ADL training  Frequency: 1x week  Duration in weeks: 8  Plan of Care beginning date: 2/8/24  Plan of Care expiration date: 3/29/2024  Treatment plan discussed with: patient           Precautions: Asthma    POC expires Unit limit Auth Expiration date PT/OT + Visit Limit?   2/7/24 4 N/A 60     Visit/Unit Tracking  AUTH Status:  Date 1/10 1/17 1/24 2/8   60 visits pcy Used 1 2 3 4    Remaining  59 58 57 56      Pertinent Findings:                                                                                            Test / Measure  12/13/2023 1/17/2024   FOTO (Predicted 69) 57 83   B hip ext MMT 4/5    Prone instability test  "-        Special Tests:  Lumbar Specific and Neural Tension  Test / Measure  12/13/2023   Straight Leg raise -   Crossed straight leg raise -   Prone instability test                -             Manuals 12/13 12/20 1/10 1/17 1/24 2/8   L/s STM, CPA mobs MM 8' gr III/IV                                   Neuro Re-Ed         LTRs 10x ea 20x ea 15x ea      Clamshells HL 2x10 btb HL 2x10 mtb During bridge      PPT 10x5\" 15x5\"       Pallof press  2x10 B dbl otb 2x10 B dbl gtb Circles 2x15 B dbl gtb Circles 2x15 B dbl gtb Circles 2x15 B  Dbl gtb   Hip add iso squeeze  15x5\" 15x5\"      PB rollout 3 way  10x ea 10x ea      [place] diag lift    2x10 B 10# 2x10 B 10# 2x10 B 10#     [place] diag chop    2x10 B 14# 2x10 B 15# 2x10 B 15#   TB sidestepping    4 laps gtb     Runner's step up + Sh ext     15x alt LE 9\" step + btb 15x alt LE 9\" step + btb   Deadbugs     15x ea alt w/ PB 15x ea alt w/PB   Birddogs     2x10 ea alt 2x10 ea alt   Ther Ex         HEP review 5'        NuStep  8' L7 8' L6 8' L7 8' L7 8' L7   Bridges 2x10 3x10 + clams 3x10 mtb  3x10 15# 3x10 15#   SLR  2x10 B 4 way  15x ea B 1#      DL / rack pull    3x8 15# 3x8 20#    Unilat rows  2x10 B dbl gtb + trunk rot 2x10 B dbl gtb      Reverse hypers     2x10 alt LE 2x10 alt LE            Ther Activity         STS  2x10 2x10 8# 2x10 10# 2x10 12# 2x10 12#   FSU         LSU    15x B 9\"     Suitcase carry  400ft B 10# 400ft B 15# 300ft B 20# 300ft B 25# 300 ft B 25#   Anterior carry         Gait Training                           Modalities                                      "

## 2024-02-14 ENCOUNTER — APPOINTMENT (OUTPATIENT)
Dept: PHYSICAL THERAPY | Facility: REHABILITATION | Age: 34
End: 2024-02-14
Payer: COMMERCIAL

## 2024-02-21 ENCOUNTER — APPOINTMENT (OUTPATIENT)
Dept: PHYSICAL THERAPY | Facility: REHABILITATION | Age: 34
End: 2024-02-21
Payer: COMMERCIAL

## 2024-03-01 DIAGNOSIS — J45.20 MILD INTERMITTENT ASTHMA WITHOUT COMPLICATION: ICD-10-CM

## 2024-03-01 RX ORDER — ALBUTEROL SULFATE 90 UG/1
AEROSOL, METERED RESPIRATORY (INHALATION)
Qty: 8.5 G | Refills: 1 | Status: SHIPPED | OUTPATIENT
Start: 2024-03-01

## 2024-03-06 DIAGNOSIS — J45.20 MILD INTERMITTENT ASTHMA WITHOUT COMPLICATION: ICD-10-CM

## 2024-03-06 RX ORDER — FLUTICASONE PROPIONATE AND SALMETEROL 50; 100 UG/1; UG/1
POWDER RESPIRATORY (INHALATION)
Qty: 60 BLISTER | Refills: 1 | Status: SHIPPED | OUTPATIENT
Start: 2024-03-06

## 2024-03-20 ENCOUNTER — HOSPITAL ENCOUNTER (OUTPATIENT)
Dept: MAMMOGRAPHY | Facility: CLINIC | Age: 34
Discharge: HOME | End: 2024-03-20
Payer: COMMERCIAL

## 2024-03-20 ENCOUNTER — HOSPITAL ENCOUNTER (OUTPATIENT)
Dept: ULTRASOUND IMAGING | Facility: CLINIC | Age: 34
Discharge: HOME | End: 2024-03-20
Payer: COMMERCIAL

## 2024-03-20 VITALS — HEIGHT: 63 IN | WEIGHT: 215 LBS | BODY MASS INDEX: 38.09 KG/M2

## 2024-03-20 DIAGNOSIS — N64.4 BREAST PAIN, LEFT: ICD-10-CM

## 2024-03-20 PROCEDURE — 76642 ULTRASOUND BREAST LIMITED: CPT

## 2024-03-20 PROCEDURE — G0279 TOMOSYNTHESIS, MAMMO: HCPCS

## 2024-03-20 PROCEDURE — 77066 DX MAMMO INCL CAD BI: CPT

## 2024-03-25 ENCOUNTER — ANNUAL EXAM (OUTPATIENT)
Age: 34
End: 2024-03-25
Payer: COMMERCIAL

## 2024-03-25 VITALS
HEIGHT: 60 IN | WEIGHT: 217.8 LBS | SYSTOLIC BLOOD PRESSURE: 102 MMHG | DIASTOLIC BLOOD PRESSURE: 68 MMHG | BODY MASS INDEX: 42.76 KG/M2

## 2024-03-25 DIAGNOSIS — Z11.51 SCREENING FOR HUMAN PAPILLOMAVIRUS (HPV): ICD-10-CM

## 2024-03-25 DIAGNOSIS — Z01.419 ENCOUNTER FOR ANNUAL ROUTINE GYNECOLOGICAL EXAMINATION: Primary | ICD-10-CM

## 2024-03-25 DIAGNOSIS — Z12.4 SCREENING FOR CERVICAL CANCER: ICD-10-CM

## 2024-03-25 PROCEDURE — 99395 PREV VISIT EST AGE 18-39: CPT | Performed by: OBSTETRICS & GYNECOLOGY

## 2024-03-25 NOTE — PROGRESS NOTES
Hannahailynadele Shari  1990      CC:  Yearly exam    S:  33 y.o. female here for yearly exam. Her cycles are regular, not heavy or crampy.     She denies vaginal discharge, itching, pelvic pain.   She has no urinary concerns, does not have incontinence.  No bowel concerns.  No breast concerns.  Known stable left breast fibroadenoma.     Also known dermoid - asymptomatic, consider repeat pelvic US next year.     Sexual activity: She is sexually active without pain, bleeding or dryness.   She is  and monogamous.   She is not interested in STD screening today.     Contraception: She uses vasectomy for contraception.     Last Pap: 3/13/23 - NILM ( HPV invalid)  Last Mammo:  3/20/24 - bIRad 2, follow up age 38    We reviewed ASCCP guidelines for Pap testing today.     Family hx of breast cancer: no  Family hx of ovarian cancer: no  Family hx of colon cancer: no      Current Outpatient Medications:     albuterol (PROVENTIL HFA,VENTOLIN HFA) 90 mcg/act inhaler, TAKE 2 PUFFS BY MOUTH EVERY 4 HOURS AS NEEDED FOR WHEEZE, Disp: 8.5 g, Rfl: 1    Azelastine HCl 137 MCG/SPRAY SOLN, USE 1 SPRAY INTO EACH NOSTRIL TWICE A DAY, Disp: 90 mL, Rfl: 0    budesonide (RINOCORT AQUA) 32 MCG/ACT nasal spray, 1 spray into each nostril daily, Disp: , Rfl:     Fluticasone-Salmeterol (Advair Diskus) 100-50 mcg/dose inhaler, INHALE 1 PUFF 2 TIMES A DAY RINSE MOUTH AFTER USE., Disp: 60 blister, Rfl: 1    Polyethylene Glycol 3350 (MIRALAX PO), Take by mouth, Disp: , Rfl:   Patient Active Problem List   Diagnosis    Mild intermittent asthma without complication    Eczema    Allergic rhinitis    Class 1 obesity due to excess calories with body mass index (BMI) of 34.0 to 34.9 in adult    Left ovarian cyst    Status post primary low transverse  section    History of insulin controlled gestational diabetes mellitus    Chest wall pain    Anxiety    Constipation    Chronic pain of both shoulders    Chronic midline low back pain  without sciatica    Prediabetes    Thrombocythemia    Flu-like symptoms     Past Medical History:   Diagnosis Date    Allergic     Asthma     Fibroadenoma of left breast      Family History   Problem Relation Age of Onset    Hypertension Mother     Arthritis Mother     Hypothyroidism Mother     Hypertension Father     Hypothyroidism Sister     Hypothyroidism Sister     Arthritis Maternal Grandmother     Cancer Maternal Grandfather     Stroke Paternal Grandmother     No Known Problems Paternal Grandfather     Breast cancer Neg Hx     Ovarian cancer Neg Hx     Cervical cancer Neg Hx     Uterine cancer Neg Hx           Review of Systems   Respiratory: Negative.    Cardiovascular: Negative.    Gastrointestinal: Negative for constipation and diarrhea.     O:  Blood pressure 102/68, height 5' (1.524 m), weight 98.8 kg (217 lb 12.8 oz), last menstrual period 03/11/2024, not currently breastfeeding.    Patient appears well and is not in distress  Breasts are symmetrical without mass, tenderness, nipple discharge, skin changes or adenopathy.   Abdomen is soft and nontender without masses.   External genitals are normal without lesions or rashes.  Urethral meatus and urethra are normal  Bladder is normal to palpation  Vagina is normal without discharge or bleeding.   Cervix is normal without discharge or lesion.   Uterus is normal, mobile, nontender without palpable mass.  Adnexa are normal, nontender, without palpable mass.     A:  Yearly exam.     P:   Pap & HPV today   Mammo age 38   RTO one year for yearly exam or sooner as needed.

## 2024-03-31 NOTE — PROGRESS NOTES
Problem List Items Addressed This Visit        Respiratory    Mild intermittent asthma without complication     Uses Advair one puff daily  Has albuterol inhaler for PRN use             Other    Obesity (BMI 30 0-34  9)     Early 1hr gtt 155 - has slip to do 3hr gtt - has appt Saturday  Rec  11-25# TWG         First pregnancy, second trimester - Primary     33 y o  female here for routine PN visit at [facility] well overall  First OB labs - normal  Gc/chlamydia - collected today   Pap - 11/14/19 neg/neg  Blue folder -  Given and discussed today   Genetic screening - has MFM consult 8/8 to discuss     Notes a lot of nausea, some vomiting (not daily)  Has history of a gastric ulcer and reflux  Previously was on Nexium  Currently on no meds  Recommended omeprazole 20mg po daily  Can use Zofran in the future if needed                  Relevant Orders    POCT urine dip    Chlamydia/GC amplified DNA by PCR Pending recommendations from hospitalist; as per [facility] consult note: has been on insulin pump / was told by team as well as family that she can;t have her insulin pump in [place], upon removal of insulin pump please give lantus 20 q24 hours and start her on sliding scale;  for anal fissure pain can add 2% lidocaine gel topical and give miralax bid and senna, if symptoms and mild bleeding while wiping doesn't improve please consult GI; consult Lopressor 25mg PO TID;(consider changing to Toprol XL  to minimize # of pills taken tata

## 2024-04-04 ENCOUNTER — TELEPHONE (OUTPATIENT)
Age: 34
End: 2024-04-04

## 2024-04-04 NOTE — TELEPHONE ENCOUNTER
Called and spoke with patient and advised of pap results and will re-test in one year. Patient already scheduled for yearly on 3/26/25.

## 2024-05-01 ENCOUNTER — OFFICE VISIT (OUTPATIENT)
Dept: INTERNAL MEDICINE CLINIC | Facility: CLINIC | Age: 34
End: 2024-05-01
Payer: COMMERCIAL

## 2024-05-01 VITALS
OXYGEN SATURATION: 97 % | TEMPERATURE: 98.8 F | DIASTOLIC BLOOD PRESSURE: 68 MMHG | HEIGHT: 63 IN | BODY MASS INDEX: 38.27 KG/M2 | WEIGHT: 216 LBS | HEART RATE: 78 BPM | RESPIRATION RATE: 16 BRPM | SYSTOLIC BLOOD PRESSURE: 122 MMHG

## 2024-05-01 DIAGNOSIS — J30.1 SEASONAL ALLERGIC RHINITIS DUE TO POLLEN: ICD-10-CM

## 2024-05-01 DIAGNOSIS — R73.03 PREDIABETES: ICD-10-CM

## 2024-05-01 DIAGNOSIS — J45.20 MILD INTERMITTENT ASTHMA WITHOUT COMPLICATION: ICD-10-CM

## 2024-05-01 DIAGNOSIS — D75.839 THROMBOCYTHEMIA: ICD-10-CM

## 2024-05-01 DIAGNOSIS — F41.9 ANXIETY: ICD-10-CM

## 2024-05-01 DIAGNOSIS — E66.01 CLASS 2 SEVERE OBESITY DUE TO EXCESS CALORIES WITH SERIOUS COMORBIDITY AND BODY MASS INDEX (BMI) OF 38.0 TO 38.9 IN ADULT (HCC): Primary | ICD-10-CM

## 2024-05-01 PROBLEM — M25.511 CHRONIC PAIN OF BOTH SHOULDERS: Status: RESOLVED | Noted: 2023-11-22 | Resolved: 2024-05-01

## 2024-05-01 PROBLEM — R68.89 FLU-LIKE SYMPTOMS: Status: RESOLVED | Noted: 2023-12-27 | Resolved: 2024-05-01

## 2024-05-01 PROBLEM — M54.50 CHRONIC MIDLINE LOW BACK PAIN WITHOUT SCIATICA: Status: RESOLVED | Noted: 2023-11-22 | Resolved: 2024-05-01

## 2024-05-01 PROBLEM — M25.512 CHRONIC PAIN OF BOTH SHOULDERS: Status: RESOLVED | Noted: 2023-11-22 | Resolved: 2024-05-01

## 2024-05-01 PROBLEM — E66.812 CLASS 2 OBESITY DUE TO EXCESS CALORIES WITH BODY MASS INDEX (BMI) OF 38.0 TO 38.9 IN ADULT: Status: ACTIVE | Noted: 2022-08-07

## 2024-05-01 PROBLEM — G89.29 CHRONIC PAIN OF BOTH SHOULDERS: Status: RESOLVED | Noted: 2023-11-22 | Resolved: 2024-05-01

## 2024-05-01 PROBLEM — G89.29 CHRONIC MIDLINE LOW BACK PAIN WITHOUT SCIATICA: Status: RESOLVED | Noted: 2023-11-22 | Resolved: 2024-05-01

## 2024-05-01 PROCEDURE — 3725F SCREEN DEPRESSION PERFORMED: CPT | Performed by: INTERNAL MEDICINE

## 2024-05-01 PROCEDURE — 99215 OFFICE O/P EST HI 40 MIN: CPT | Performed by: INTERNAL MEDICINE

## 2024-05-01 NOTE — ASSESSMENT & PLAN NOTE
-BMI 38 with comorbidities of prediabetes/GDM and asthma  -failed multiple trials of diet and exercise  -agreeable to referral with  Weight Loss Management Program

## 2024-05-01 NOTE — ASSESSMENT & PLAN NOTE
-reports social anxiety, seen by therapist when she was in [place] but has struggled to continue care locally  -has never treated with pharmacotherapy  -will refer to [facility] for therapy

## 2024-05-01 NOTE — ASSESSMENT & PLAN NOTE
-long standing history in childhood, triggered by allergies and dust  -continue advair and albuterol prn

## 2024-05-01 NOTE — PROGRESS NOTES
Assessment/Plan:    Problem List Items Addressed This Visit     Mild intermittent asthma without complication     -long standing history in childhood, triggered by allergies and dust  -continue advair and albuterol prn         Allergic rhinitis     -pollen and dust  -continue azelastine nasal spray  -add zyrtec         Class 2 obesity due to excess calories with body mass index (BMI) of 38.0 to 38.9 in adult - Primary     -BMI 38 with comorbidities of prediabetes/GDM and asthma  -failed multiple trials of diet and exercise  -agreeable to referral with  Weight Loss Management Program         Relevant Orders    Ambulatory referral to Weight Management    Hemoglobin A1C    Basic metabolic panel    CBC and differential    Anxiety     -reports social anxiety, seen by therapist when she was in [place] but has struggled to continue care locally  -has never treated with pharmacotherapy  -will refer to [facility] for therapy         Relevant Orders    Ambulatory referral to Psych Services    Prediabetes     -A1c 6.0 in Nov, has h/o GDM  -multiple diet and exercises attempts made  -will repeat lab         Relevant Orders    Hemoglobin A1C    Basic metabolic panel    Thrombocythemia     -recurrent, mild  -will follow         Relevant Orders    CBC and differential       Subjective:      Patient ID: Reba Mccarthy is a 33 y.o. female.    HPI  34yo female here as a new patient.    Has h/o gestational DM in 2023.  Recent lab showed A1c 6.0.  She is trying to diet and has lost weight, about 2pounds.  She is worried because she has gained weight since moving to [place], previous weight was 180pounds six years ago.  Prepregnancy weight was 215pounds.  She was doing intermittent fasting and walked and her weight decreased to 190pounds but her lifestyle changed.  She is currently trying to remain under 1500kcal diet but only lasts for a few weeks.  Tries to do exercises at home and is starting to walk outside again.    Asthma is triggered  "by pollen, dust. Not currently flared.    The following portions of the patient's history were reviewed and updated as appropriate: allergies, current medications, past family history, past medical history, past social history, past surgical history and problem list.      Current Outpatient Medications:   •  albuterol (PROVENTIL HFA,VENTOLIN HFA) 90 mcg/act inhaler, TAKE 2 PUFFS BY MOUTH EVERY 4 HOURS AS NEEDED FOR WHEEZE, Disp: 8.5 g, Rfl: 1  •  Azelastine HCl 137 MCG/SPRAY SOLN, USE 1 SPRAY INTO EACH NOSTRIL TWICE A DAY, Disp: 90 mL, Rfl: 0  •  budesonide (RINOCORT AQUA) 32 MCG/ACT nasal spray, 1 spray into each nostril daily, Disp: , Rfl:   •  Fluticasone-Salmeterol (Advair Diskus) 100-50 mcg/dose inhaler, INHALE 1 PUFF 2 TIMES A DAY RINSE MOUTH AFTER USE., Disp: 60 blister, Rfl: 1  •  Polyethylene Glycol 3350 (MIRALAX PO), Take by mouth, Disp: , Rfl:     Review of Systems   Constitutional:  Positive for activity change, appetite change and unexpected weight change.   HENT:  Negative for congestion, postnasal drip and rhinorrhea.    Respiratory:  Negative for cough, shortness of breath and wheezing.    Cardiovascular:  Negative for chest pain, palpitations and leg swelling.   Gastrointestinal:  Negative for abdominal pain, constipation, diarrhea, nausea and vomiting.   Neurological:  Negative for dizziness and headaches.         Objective:    /68 (BP Location: Left arm, Patient Position: Sitting, Cuff Size: Large)   Pulse 78   Temp 98.8 °F (37.1 °C) (Tympanic Core)   Resp 16   Ht 5' 3\" (1.6 m)   Wt 98 kg (216 lb)   SpO2 97%   BMI 38.26 kg/m²      Physical Exam  Vitals reviewed.   Constitutional:       General: She is not in acute distress.     Appearance: She is obese.   HENT:      Head: Normocephalic.      Right Ear: Tympanic membrane and ear canal normal.      Left Ear: Tympanic membrane and ear canal normal.      Nose: Congestion (L nasal turbinate hypertrophy) present.      Mouth/Throat:      " Mouth: Mucous membranes are moist.   Cardiovascular:      Rate and Rhythm: Normal rate and regular rhythm.      Pulses: Normal pulses.      Heart sounds: Normal heart sounds.   Pulmonary:      Effort: Pulmonary effort is normal. No respiratory distress.      Breath sounds: Normal breath sounds. No wheezing.   Musculoskeletal:      Cervical back: No tenderness.      Right lower leg: No edema.      Left lower leg: No edema.   Lymphadenopathy:      Cervical: No cervical adenopathy.   Skin:     Coloration: Skin is not pale.   Neurological:      Mental Status: She is alert and oriented to person, place, and time.   Psychiatric:         Attention and Perception: Attention normal.         Mood and Affect: Mood is not anxious.           Recent Results (from the past 5040 hour(s))   Lipid panel    Collection Time: 11/22/23 12:13 PM   Result Value Ref Range    Cholesterol 147 See Comment mg/dL    Triglycerides 69 See Comment mg/dL    HDL, Direct 39 (L) >=50 mg/dL    LDL Calculated 94 0 - 100 mg/dL    Non-HDL-Chol (CHOL-HDL) 108 mg/dl   Hemoglobin A1C    Collection Time: 11/22/23 12:13 PM   Result Value Ref Range    Hemoglobin A1C 6.0 (H) Normal 4.0-5.6%; PreDiabetic 5.7-6.4%; Diabetic >=6.5%; Glycemic control for adults with diabetes <7.0% %     mg/dl   CBC and differential    Collection Time: 11/22/23 12:13 PM   Result Value Ref Range    WBC 7.11 4.31 - 10.16 Thousand/uL    RBC 4.35 3.81 - 5.12 Million/uL    Hemoglobin 12.7 11.5 - 15.4 g/dL    Hematocrit 38.9 34.8 - 46.1 %    MCV 89 82 - 98 fL    MCH 29.2 26.8 - 34.3 pg    MCHC 32.6 31.4 - 37.4 g/dL    RDW 12.9 11.6 - 15.1 %    MPV 9.2 8.9 - 12.7 fL    Platelets 458 (H) 149 - 390 Thousands/uL    nRBC 0 /100 WBCs    Segmented % 58 43 - 75 %    Immature Grans % 0 0 - 2 %    Lymphocytes % 33 14 - 44 %    Monocytes % 6 4 - 12 %    Eosinophils Relative 2 0 - 6 %    Basophils Relative 1 0 - 1 %    Absolute Neutrophils 4.16 1.85 - 7.62 Thousands/µL    Absolute Immature Grans  0.01 0.00 - 0.20 Thousand/uL    Absolute Lymphocytes 2.32 0.60 - 4.47 Thousands/µL    Absolute Monocytes 0.42 0.17 - 1.22 Thousand/µL    Eosinophils Absolute 0.16 0.00 - 0.61 Thousand/µL    Basophils Absolute 0.04 0.00 - 0.10 Thousands/µL   Basic metabolic panel    Collection Time: 11/22/23 12:13 PM   Result Value Ref Range    Sodium 139 135 - 147 mmol/L    Potassium 3.8 3.5 - 5.3 mmol/L    Chloride 103 96 - 108 mmol/L    CO2 28 21 - 32 mmol/L    ANION GAP 8 mmol/L    BUN 7 5 - 25 mg/dL    Creatinine 0.70 0.60 - 1.30 mg/dL    Glucose, Fasting 93 65 - 99 mg/dL    Calcium 9.2 8.4 - 10.2 mg/dL    eGFR 112 ml/min/1.73sq m   TSH, 3rd generation with Free T4 reflex    Collection Time: 11/22/23 12:13 PM   Result Value Ref Range    TSH 3RD GENERATON 2.743 0.450 - 4.500 uIU/mL   Covid/Flu- Office Collect Normal    Collection Time: 12/27/23  4:37 PM    Specimen: Nose; Nares   Result Value Ref Range    SARS-CoV-2 Negative Negative    INFLUENZA A PCR Negative Negative    INFLUENZA B PCR Negative Negative     I have spent a total time of 45 minutes on 05/01/24 in caring for this patient including Diagnostic results, Prognosis, Risks and benefits of tx options, Instructions for management, Patient and family education, Importance of tx compliance, Risk factor reductions, Impressions, Counseling / Coordination of care, Documenting in the medical record, Reviewing / ordering tests, medicine, procedures  , and Obtaining or reviewing history  .

## 2024-05-08 DIAGNOSIS — J45.20 MILD INTERMITTENT ASTHMA WITHOUT COMPLICATION: ICD-10-CM

## 2024-05-08 RX ORDER — FLUTICASONE PROPIONATE AND SALMETEROL 50; 100 UG/1; UG/1
POWDER RESPIRATORY (INHALATION)
Qty: 180 BLISTER | Refills: 1 | Status: SHIPPED | OUTPATIENT
Start: 2024-05-08

## 2024-05-08 RX ORDER — AZELASTINE HYDROCHLORIDE 137 UG/1
SPRAY, METERED NASAL
Qty: 90 ML | Refills: 1 | Status: SHIPPED | OUTPATIENT
Start: 2024-05-08

## 2024-05-22 ENCOUNTER — OFFICE VISIT (OUTPATIENT)
Dept: BARIATRICS | Facility: CLINIC | Age: 34
End: 2024-05-22
Payer: COMMERCIAL

## 2024-05-22 ENCOUNTER — TELEPHONE (OUTPATIENT)
Dept: BARIATRICS | Facility: CLINIC | Age: 34
End: 2024-05-22

## 2024-05-22 VITALS
SYSTOLIC BLOOD PRESSURE: 116 MMHG | DIASTOLIC BLOOD PRESSURE: 72 MMHG | HEART RATE: 75 BPM | WEIGHT: 213.8 LBS | HEIGHT: 63 IN | BODY MASS INDEX: 37.88 KG/M2

## 2024-05-22 DIAGNOSIS — E66.01 CLASS 2 SEVERE OBESITY DUE TO EXCESS CALORIES WITH SERIOUS COMORBIDITY AND BODY MASS INDEX (BMI) OF 38.0 TO 38.9 IN ADULT (HCC): ICD-10-CM

## 2024-05-22 DIAGNOSIS — R73.03 PREDIABETES: ICD-10-CM

## 2024-05-22 DIAGNOSIS — K59.00 CONSTIPATION, UNSPECIFIED CONSTIPATION TYPE: Primary | ICD-10-CM

## 2024-05-22 PROCEDURE — 99204 OFFICE O/P NEW MOD 45 MIN: CPT | Performed by: NURSE PRACTITIONER

## 2024-05-22 NOTE — PROGRESS NOTES
Assessment/Plan:    Class 2 obesity due to excess calories with body mass index (BMI) of 38.0 to 38.9 in adult  - Discussed options of HealthyCORE-Intensive Lifestyle Intervention Program, Very Low Calorie Diet-VLCD, and Conservative Program and the role of weight loss medications.  - Explained the importance of making lifestyle changes in addition to starting any anti-obesity medications.   - Patient is interested in pursuing Conservative Program and follow up visits with medical weight management provider.  - Initial weight loss goal of 5-10% weight loss for improved health  - Weight loss can improve patient's co-morbid conditions and/or prevent weight-related complications.  - Weight is not at goal and patient has been unable to achieve a meaningful weight loss above 5% using various programs and tools for more than 6 months  - Labs reviewed from 11/2023  -Patient lifestyle habits were reviewed and barriers to weight loss were addressed today.  Patient will be meeting with the dietitian to better structure her nutrition and help with meal planning.  Patient will likely need to work on structuring her day and making sure she is meeting all of her nutritional requirements.  Hydration was discussed and patient will continue to drink at least 64 ounces of water daily and will consider cutting back on her diet soda intake.   Activity was discussed and patient will consider adopting a more formal walking routine for at least 30 minutes 2-3 times a week to start.  Medications were discussed:  Phentermine to be used cautiously as patient does have some anxiety  Topiramate discussed and patient denies any history of kidney stones  Bupropion/naltrexone was discussed to help with cravings and patient may consider if she does not have coverage for GLP-1 medications  GLP-1 medications were discussed and patient would like to pursue this line of treatment to help with her hunger, appetite, as well as her blood  sugars.    Wegovy Instructions:    - Begin Wegovy 0.25 mg subcutaneously once a week. Dose changes may occur after 4 doses if medication is tolerated. You will be assessed prior to each dose change to make sure you are tolerating the medication well.  - Please message me when you have 2 pens left from the prescription so there are no lapses in treatment.  - If you have been off the medication for more than 14 days please contact the office as you will need to restart the titration at the starting dose again to avoid significant side effects or adverse events.  - Visit [email or web address] for further information/injection instructions.   -Please eat small frequent meals to help reduce nausea. Lemon water and saltine crackers may help with this.   - Side effects of Wegovy discussed: nausea, vomiting, diarrhea, and constipation. If you experience fever, nausea/vomiting, and pain radiating to your back this may be a sign of pancreatitis. Please go to the emergency room if this occurs.  - Patient understands the side effects of the medication and proper administration. Patient agrees with the treatment plan and all questions were answered.  - Supply concerns were discussed with patient and patient voiced understanding that they will need to call with adequate time for refills to avoid any lapses in treatment.  Patient may also have to call around to different pharmacies to see if any pharmacy has the appropriate dose in stock.      Goals:  Calorie Goal: 7327-5879 calories  Do not skip meals.  Food log via fawad or provided paper log (fawad options include www.MYagonism.com.com, sparkpeople.com, loseit.com, calorieking.com, Mobile [employer])  No sugary beverages.   At least 64oz of water daily.  Increase physical activity by 10 minutes daily. Gradually increase physical activity to a goal of 5 days per week for 30 minutes of MODERATE intensity PLUS 2 days per week of FULL BODY resistance training     Constipation  Encourage patient to make  sure she is keeping with a regular bowel regimen while potentially starting on a GLP-1 medication    Prediabetes  History of gestational diabetes  Latest A1c is 6.0  Would benefit from GLP-1 therapy  May consider metformin         Reba was seen today for consult.    Diagnoses and all orders for this visit:    Constipation, unspecified constipation type    Class 2 severe obesity due to excess calories with serious comorbidity and body mass index (BMI) of 38.0 to 38.9 in adult (HCC)  -     Ambulatory referral to Weight Management  -     Semaglutide-Weight Management (WEGOVY) 0.25 MG/0.5ML; Inject 0.5 mL (0.25 mg total) under the skin once a week    Prediabetes       Patient denies personal history of pancreatitis. Patient also denies personal and family history of medullary thyroid cancer and multiple endocrine neoplasia type 2 (MEN 2 tumor). Patient denies any history of kidney stones, seizures, or glaucoma.   with vasectomy.  .  Medication consent was reviewed today and all questions were answered.  Patient agreed with all bulleted points.  Consent was signed, scanned into chart and patient was provided with a copy for their records.      Total time spent reviewing chart, interviewing patient, examining patient, discussing plan, answering all questions, and documentin min, with >50% face-to-face time spent counseling patient on nonsurgical interventions for the treatment of excess weight. Discussed in detail nonsurgical options including intensive lifestyle intervention program, very low-calorie diet program and conservative program.  Discussed the role of weight loss medications.  Counseled patient on diet behavior and exercise modification for weight loss.    Follow up in approximately 2 months with Non-Surgical Physician/Advanced Practitioner.    Subjective:   Chief Complaint   Patient presents with    Consult     Pt is here today for MWM consult.        Patient ID: Reba OcampoaaronJusto  is a 33  y.o. female with excess weight/obesity here to pursue weight management.  Previous notes and records have been reviewed.    Past Medical History:   Diagnosis Date    Allergic     Asthma     Chronic midline low back pain without sciatica 2023    Chronic pain of both shoulders 2023    Fibroadenoma of left breast     Flu-like symptoms 2023     Past Surgical History:   Procedure Laterality Date    BREAST BIOPSY Left     Done in  for a Fibroadenoma    CHOLECYSTECTOMY LAPAROSCOPIC N/A 2023    Procedure: CHOLECYSTECTOMY LAPAROSCOPIC W/ INTRAOP CHOLANGIOGRAM;  Surgeon: Baltazar Wilcox MD;  Location: [facility];  Service: General    CYST REMOVAL N/A     low back    AL  DELIVERY ONLY N/A 2023    Procedure:  SECTION ();  Surgeon: Ashley Jones MD;  [facility]: [facility];  Service: Obstetrics    WISDOM TOOTH EXTRACTION         HPI:  Wt Readings from Last 20 Encounters:   24 97 kg (213 lb 12.8 oz)   24 98 kg (216 lb)   24 98.8 kg (217 lb 12.8 oz)   24 97.5 kg (215 lb)   23 100 kg (221 lb)   23 100 kg (220 lb 6.4 oz)   23 100 kg (221 lb 3.2 oz)   23 100 kg (221 lb)   23 97.5 kg (215 lb)   23 94.1 kg (207 lb 6.4 oz)   23 93.1 kg (205 lb 3.2 oz)   23 95.4 kg (210 lb 6.4 oz)   23 97.2 kg (214 lb 3.2 oz)   23 97.2 kg (214 lb 3.2 oz)   23 96.8 kg (213 lb 6.4 oz)   23 98.4 kg (217 lb)   23 98.2 kg (216 lb 8 oz)   01/10/23 97.7 kg (215 lb 4.8 oz)   23 98.3 kg (216 lb 12.8 oz)   23 97.9 kg (215 lb 12.8 oz)       Patient presents today to medical weight management office for consult.  [place] and has gained weight.  Prior to getting pregnant she was 220 pounds and lost weight during pregnancy due to her diet restrictions with her gestational diabetes.  Patient has been successful in the past with  keto and intermittent fasting.  She finds a soon as she stops these programs she goes back up to a higher weight and is unable to sustain.  Currently patient is trying to maintain around 1800 italo/day.  Patient would like to discuss possibly starting on medication to help with her weight loss.  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  Patient is hoping to get guidance to get back on track with her health.      Obesity/Excess Weight:  Severity: Moderate  Onset:  10 years    Modifiers: Diet and Exercise  Contributing factors: Poor Food Choices, Insufficient Physical Activity, Binge Eating, Pregnancy, Lack of knowledge of appropriate lifestyle changes, and Insufficient time to make appropriate lifestyle changes  Associated symptoms: comorbid conditions, fatigue, increased joint pain, decreased exercise capacity, body image issues, decreased self esteem, increased shortness of breath, decreased mobility, depression, inability to do certain activities, and clothes do not fit    Diet recall:  B: skips - coffee with hot cocoa powder  L: sandwich - ham and cheese with vegetables OR low italo wrap with ham and cheese  S: no  D: protein (chicken or pork) with rice or pasta with vegetables  S: oatmeal mug cake     Hydration: coffee with hot cocoa powder, diet soda - 1-2 cans, water - 64-80 oz  Alcohol: once every couple of week  Smoking: no  Exercise: occasionally walking  Occupation: stay at home mom  Sleep: varies  STOP ban/8    Current weight: 213.8 lbs  Current BMI: 37.87  Current Waist Measurement: 40.5 in  Goal weight: 180 lbs      The following portions of the patient's history were reviewed and updated as appropriate: allergies, current medications, past family history, past medical history, past social history, past surgical history, and problem list.    Family History   Problem Relation Age of Onset    Hypertension Mother   "   Arthritis Mother     Hypothyroidism Mother     Stroke Father     Hypertension Father     Hypothyroidism Sister     Hypothyroidism Sister     Arthritis Maternal Grandmother     Cancer Maternal Grandfather     Stroke Paternal Grandmother     No Known Problems Paternal Grandfather     Breast cancer Neg Hx     Ovarian cancer Neg Hx     Cervical cancer Neg Hx     Uterine cancer Neg Hx         Review of Systems   Constitutional:  Negative for fatigue.   HENT:  Negative for sore throat.    Respiratory:  Negative for cough and shortness of breath.    Cardiovascular:  Negative for chest pain, palpitations and leg swelling.   Gastrointestinal:  Positive for constipation. Negative for abdominal pain, diarrhea and nausea.   Genitourinary:  Negative for dysuria.   Musculoskeletal:  Negative for arthralgias and back pain.   Skin:  Negative for rash.   Neurological:  Negative for headaches.   Psychiatric/Behavioral:  Negative for dysphoric mood. The patient is nervous/anxious.        Objective:  /72 (BP Location: Left arm, Patient Position: Sitting, Cuff Size: Large)   Pulse 75   Ht 5' 3\" (1.6 m)   Wt 97 kg (213 lb 12.8 oz)   LMP 05/11/2024 (Approximate)   BMI 37.87 kg/m²     Physical Exam  Vitals and nursing note reviewed.   Constitutional:       Appearance: Normal appearance. She is obese.   HENT:      Head: Normocephalic.   Pulmonary:      Effort: Pulmonary effort is normal.   Neurological:      General: No focal deficit present.      Mental Status: She is alert and oriented to person, place, and time.   Psychiatric:         Mood and Affect: Mood normal.         Behavior: Behavior normal.         Thought Content: Thought content normal.         Judgment: Judgment normal.              Labs and Imaging  Recent labs and imaging have been personally reviewed.  Lab Results   Component Value Date    WBC 7.11 11/22/2023    HGB 12.7 11/22/2023    HCT 38.9 11/22/2023    MCV 89 11/22/2023     (H) 11/22/2023     Lab " Results   Component Value Date    SODIUM 139 11/22/2023    K 3.8 11/22/2023     11/22/2023    CO2 28 11/22/2023    AGAP 8 11/22/2023    BUN 7 11/22/2023    CREATININE 0.70 11/22/2023    GLUC 103 02/23/2023    GLUF 93 11/22/2023    CALCIUM 9.2 11/22/2023    AST 11 (L) 03/22/2023    ALT 10 03/22/2023    ALKPHOS 78 03/22/2023    TP 6.8 03/22/2023    TBILI 0.69 03/22/2023    EGFR 112 11/22/2023     Lab Results   Component Value Date    HGBA1C 6.0 (H) 11/22/2023     Lab Results   Component Value Date    TMW0ZDISFKTO 2.743 11/22/2023    TSH 2.14 11/24/2021     Lab Results   Component Value Date    CHOLESTEROL 147 11/22/2023     Lab Results   Component Value Date    HDL 39 (L) 11/22/2023     Lab Results   Component Value Date    TRIG 69 11/22/2023     Lab Results   Component Value Date    LDLCALC 94 11/22/2023

## 2024-05-22 NOTE — ASSESSMENT & PLAN NOTE
- Discussed options of HealthyCORE-Intensive Lifestyle Intervention Program, Very Low Calorie Diet-VLCD, and Conservative Program and the role of weight loss medications.  - Explained the importance of making lifestyle changes in addition to starting any anti-obesity medications.   - Patient is interested in pursuing Conservative Program and follow up visits with medical weight management provider.  - Initial weight loss goal of 5-10% weight loss for improved health  - Weight loss can improve patient's co-morbid conditions and/or prevent weight-related complications.  - Weight is not at goal and patient has been unable to achieve a meaningful weight loss above 5% using various programs and tools for more than 6 months  - Labs reviewed from 11/2023  -Patient lifestyle habits were reviewed and barriers to weight loss were addressed today.  Patient will be meeting with the dietitian to better structure her nutrition and help with meal planning.  Patient will likely need to work on structuring her day and making sure she is meeting all of her nutritional requirements.  Hydration was discussed and patient will continue to drink at least 64 ounces of water daily and will consider cutting back on her diet soda intake.   Activity was discussed and patient will consider adopting a more formal walking routine for at least 30 minutes 2-3 times a week to start.  Medications were discussed:  Phentermine to be used cautiously as patient does have some anxiety  Topiramate discussed and patient denies any history of kidney stones  Bupropion/naltrexone was discussed to help with cravings and patient may consider if she does not have coverage for GLP-1 medications  GLP-1 medications were discussed and patient would like to pursue this line of treatment to help with her hunger, appetite, as well as her blood sugars.    Wegovy Instructions:    - Begin Wegovy 0.25 mg subcutaneously once a week. Dose changes may occur after 4 doses if  medication is tolerated. You will be assessed prior to each dose change to make sure you are tolerating the medication well.  - Please message me when you have 2 pens left from the prescription so there are no lapses in treatment.  - If you have been off the medication for more than 14 days please contact the office as you will need to restart the titration at the starting dose again to avoid significant side effects or adverse events.  - Visit [email or web address] for further information/injection instructions.   -Please eat small frequent meals to help reduce nausea. Lemon water and saltine crackers may help with this.   - Side effects of Wegovy discussed: nausea, vomiting, diarrhea, and constipation. If you experience fever, nausea/vomiting, and pain radiating to your back this may be a sign of pancreatitis. Please go to the emergency room if this occurs.  - Patient understands the side effects of the medication and proper administration. Patient agrees with the treatment plan and all questions were answered.  - Supply concerns were discussed with patient and patient voiced understanding that they will need to call with adequate time for refills to avoid any lapses in treatment.  Patient may also have to call around to different pharmacies to see if any pharmacy has the appropriate dose in stock.      Goals:  Calorie Goal: 5762-5180 calories  Do not skip meals.  Food log via fawad or provided paper log (fawad options include www.Yoyocard.com, sparkpeople.com, loseit.com, calorieking.com, [employer])  No sugary beverages.   At least 64oz of water daily.  Increase physical activity by 10 minutes daily. Gradually increase physical activity to a goal of 5 days per week for 30 minutes of MODERATE intensity PLUS 2 days per week of FULL BODY resistance training

## 2024-05-22 NOTE — ASSESSMENT & PLAN NOTE
Encourage patient to make sure she is keeping with a regular bowel regimen while potentially starting on a GLP-1 medication

## 2024-05-22 NOTE — ASSESSMENT & PLAN NOTE
History of gestational diabetes  Latest A1c is 6.0  Would benefit from GLP-1 therapy  May consider metformin

## 2024-06-05 NOTE — TELEPHONE ENCOUNTER
PA for wegovy    Submitted via    [x]CMM-KEY [id number]  [][employer]-Case ID #    []Faxed to plan   []Other website    []Phone call Case ID #      Office notes sent, clinical questions answered. Awaiting determination    Turnaround time for your insurance to make a decision on your Prior Authorization can take 7-21 business days.

## 2024-06-06 NOTE — TELEPHONE ENCOUNTER
Nora, from [facility], called in to inform PA for Wegovy was denied. For more information, call [phone].

## 2024-06-06 NOTE — TELEPHONE ENCOUNTER
PA for Wegovy 0.25mg EXCLUDED from plan       Reason:(Screenshot if applicable)          Message sent to office clinical pool Yes

## 2024-06-12 DIAGNOSIS — E66.01 CLASS 2 SEVERE OBESITY DUE TO EXCESS CALORIES WITH SERIOUS COMORBIDITY AND BODY MASS INDEX (BMI) OF 38.0 TO 38.9 IN ADULT (HCC): Primary | ICD-10-CM

## 2024-06-12 RX ORDER — BUPROPION HYDROCHLORIDE 150 MG/1
TABLET ORAL
Qty: 30 TABLET | Refills: 1 | Status: SHIPPED | OUTPATIENT
Start: 2024-06-12 | End: 2024-06-19

## 2024-06-12 RX ORDER — NALTREXONE HYDROCHLORIDE 50 MG/1
TABLET, FILM COATED ORAL
Qty: 30 TABLET | Refills: 1 | Status: SHIPPED | OUTPATIENT
Start: 2024-06-12

## 2024-06-19 DIAGNOSIS — E66.01 CLASS 2 SEVERE OBESITY DUE TO EXCESS CALORIES WITH SERIOUS COMORBIDITY AND BODY MASS INDEX (BMI) OF 38.0 TO 38.9 IN ADULT (HCC): ICD-10-CM

## 2024-06-19 RX ORDER — BUPROPION HYDROCHLORIDE 150 MG/1
TABLET ORAL
Qty: 180 TABLET | Refills: 1 | Status: SHIPPED | OUTPATIENT
Start: 2024-06-19

## 2024-06-19 NOTE — PROGRESS NOTES
"Weight Management Medical Nutrition Assessment  Reba is here today for menu planning. Current weight 213.7#. Patient has struggled with her weight over the last 8 years. Patient has been successful with low calorie/low carb diet, calorie restriction with fasting, and activity. She has not been able to maintain lifestyle changes long term and will regain the weight she has lost. Currently, she is trying to maintain around 1,800 kcal but was going do with little variation in her intake and no calorie tracking. Per dietary recall, patient will skip breakfast, have carbohydrate rich snacks, and typically have a balanced lunch and dinner meal with some variation for low protein meals at lunch. Discussed benefits of interval eating, adequate protein intake, and mindful eating. Reviewed high protein snack options to promote satiation, assist with blood sugar control, and increase daily intake. Encouraged patient to space calories more evenly throughout the day to support metabolic health. She will begin adding a breakfast meal before 10 am. Portions within calorie needs for weight loss reviewed. Patient would like to aim for 1#/week loss. Low-calorie meal plan reviewed. Patient expressed mental and emotional barriers to consistent behavior change. Informed patient to LCSW appointment available. Encouraged patient to increase daily activity by 10 mins with eventual goal of 30 mins 5x/week. Provided full body resistance band workout. RD card provided and options for follow up reviewed. Will f/u x 2 months.     Likes: eggs, nuts, cheese, cottage cheese, tuna     Patient seen by Medical Provider in past 6 months:  yes  Requested to schedule appointment with Medical Provider: No    Anthropometric Measurements  Provider Start Weight (#): 213.8# (5/22/24)  Current Weight (#): 213.7#  TBW % Change from start weight: --%  Ideal Body Weight (#): 115# (63\")  Goal Weight (#): 180#  Highest Weight (#): 220#  Lowest Weight (#): " 145#    Weight Loss History  Previous weight loss attempts: Exercise, Fasting  High Protein/Low CHO diets (Atkins, [place], etc.)  Nutrition Counseling with RD, Self Created Diets (Portion Control, Healthy Food Choices, etc.)    Food and Nutrition Related History  Wake up: 7:30-8 am    Bed Time: 11:30 pm-12 am  Sleep quality: fair     Dietary Recall  Around 9 am: Coffee w/ hot cocoa powder  Breakfast: --    Snack: --  Lunch (11:30-1:30 pm): sandwich (ham and cheese) on bread or low-italo wrap w/ vegetables Or more recently, pancakes   Snack: homemade bread throughout the day  Dinner (4:30-6 pm): protein (chicken or pork) w/ rice Or pasta w/ vegetables Or protein/vegetable/carb  Snack: oatmeal mug cake (sometimes adds sugar) Or something sweet     Beverages: water, coffee (w/ hot cocoa powder), diet soda (1-2 cans sprite), alcohol (once every couple weeks)  Volume of beverage intake: 48-64 oz water     Weekends: may have breakfast, if  is having cheat day, will have pizza or fast food   Cravings: less with medication but prior sweets/sugar/chocolate   Trouble area of day: evenings     Frequency of Eating out: less lately  Food restrictions: shellfish allergy  Cooking: self  Food Shopping: self and     Occupation: [employer]    Physical Activity  Activity: No formal routine, occasionally walking (enjoys dancing, walking; has weights and resistance bands at home)  Frequency: intermittently  Physical limitations/barriers to exercise: none     Estimated Needs  [facility] Energy Needs (needs at 213#)  BMR: 1,630 kcal  Maintenance calories (sedentary): 1,956 kcal  1-2#/week loss (sedentary): 956-1,456 kcal  1-2#/week loss (light activity): 1,242-1,742 kcal    Protein: 63-78 grams (1.2-1.5g/kg IBW)  Fluid: 61 ounces (35mL/kg IBW)    Nutrition Diagnosis  Yes;    Overweight/obesity related to Excess energy intake as evidenced by BMI more than normative standard for age and sex (obesity-grade II 35-39.9)      Nutrition Intervention    Nutrition Prescription  Calories: 1,300-1,400 kcal  Protein: 63-78 g  Fluid: 61 ounces    Meal Plan (Kiran/Pro)  Breakfast: 250-300/15-25   Snack: --  Lunch: 350/25  Snack: 100-150/5  Dinner: 450-500/35  Snack: 100-150/5    Nutrition Education  Calorie controlled menu  Lean protein food choices  Healthy snack options  Food journaling tips    Nutrition Counseling  Strategies: meal planning, portion sizes, healthy snack choices, hydration, fiber intake, protein intake, exercise, food logging    Monitoring and Evaluation:    Evaluation criteria  Energy Intake  Meet protein needs  Maintain adequate hydration  Monitor weekly weight  Meal planning/preparation  Food journal   Decreased portions at mealtimes and snacks  Physical activity     Barriers to change:none  Readiness to change: Preparation:  (Getting ready to change)   Comprehension: good  Expected Compliance: good

## 2024-06-20 ENCOUNTER — CLINICAL SUPPORT (OUTPATIENT)
Dept: BARIATRICS | Facility: CLINIC | Age: 34
End: 2024-06-20

## 2024-06-20 VITALS — BODY MASS INDEX: 37.86 KG/M2 | WEIGHT: 213.7 LBS | HEIGHT: 63 IN

## 2024-06-20 DIAGNOSIS — R63.5 ABNORMAL WEIGHT GAIN: Primary | ICD-10-CM

## 2024-06-20 PROCEDURE — RECHECK

## 2024-06-20 PROCEDURE — WMDI30

## 2024-07-23 ENCOUNTER — OFFICE VISIT (OUTPATIENT)
Dept: BARIATRICS | Facility: CLINIC | Age: 34
End: 2024-07-23
Payer: COMMERCIAL

## 2024-07-23 VITALS
BODY MASS INDEX: 36.89 KG/M2 | HEIGHT: 63 IN | SYSTOLIC BLOOD PRESSURE: 100 MMHG | WEIGHT: 208.2 LBS | DIASTOLIC BLOOD PRESSURE: 68 MMHG | HEART RATE: 84 BPM

## 2024-07-23 DIAGNOSIS — E66.01 CLASS 2 SEVERE OBESITY DUE TO EXCESS CALORIES WITH SERIOUS COMORBIDITY AND BODY MASS INDEX (BMI) OF 36.0 TO 36.9 IN ADULT (HCC): Primary | ICD-10-CM

## 2024-07-23 DIAGNOSIS — E66.01 CLASS 2 SEVERE OBESITY DUE TO EXCESS CALORIES WITH SERIOUS COMORBIDITY AND BODY MASS INDEX (BMI) OF 38.0 TO 38.9 IN ADULT (HCC): ICD-10-CM

## 2024-07-23 PROCEDURE — 99214 OFFICE O/P EST MOD 30 MIN: CPT | Performed by: NURSE PRACTITIONER

## 2024-07-23 RX ORDER — NALTREXONE HYDROCHLORIDE 50 MG/1
TABLET, FILM COATED ORAL
Qty: 30 TABLET | Refills: 1 | Status: SHIPPED | OUTPATIENT
Start: 2024-07-23

## 2024-07-23 RX ORDER — BUPROPION HYDROCHLORIDE 150 MG/1
TABLET ORAL
Qty: 180 TABLET | Refills: 0 | Status: SHIPPED | OUTPATIENT
Start: 2024-07-23

## 2024-07-23 NOTE — PROGRESS NOTES
Assessment/Plan:     Class 2 obesity due to excess calories with body mass index (BMI) of 36.0 to 36.9 in adult  - Patient is pursuing Conservative Program and follow up visits with medical weight management provider  - Initial weight loss goal of 5-10% weight loss for improved health. Weight loss can improve patient's co-morbid conditions and/or prevent weight-related complications.  - Explained the importance of continuing lifestyle changes in addition to any anti-obesity medications.   - Labs reviewed:    General Recommendations:  Nutrition:  Eat breakfast daily.  Do not skip meals.      Food log (ie.) [email or web address], sparkpeople.com, loseit.com, [email or web address], etc.     Practice mindful eating.  Be sure to set aside time to eat, eat slowly, and savor your food.     Hydration:    At least 64oz of water daily.  No sugar sweetened beverages.  No juice (eat the fruit instead).     Exercise:  Studies have shown that the ideal exercise goal is somewhere between 150 to 300 minutes of moderate intensity exercise a week.  Start with exercising 10 minutes every other day and gradually increase physical activity with a goal of at least 150 minutes of moderate intensity exercise a week, divided over at least 3 days a week.  An example of this would be exercising 30 minutes a day, 5 days a week.  Resistance training can increase muscle mass and increase our resting metabolic rate.   FULL BODY resistance training is recommended 2-3 times a week.  Do not do this on consecutive days to allow for muscle recovery.     Aim for a bare minimum 5000 steps, even on days you do not exercise.     Monitoring:   Weigh yourself daily.  If this causes undue stress, then just weigh yourself once a week.  Weigh yourself the same time of the day with the same amount of clothing on.  Preferably this should be done after waking up, before you eat, and with no clothing or minimal clothing on.     Specific Goals:  Patient lifestyle habits  "were reviewed and patient was congratulated on her weight loss since last office visit. Nutrition was discussed and she will continue to avoid skipping meals and try to balance her calories. She will focus on protein rich snacks.  Hydration was discussed and patient will continue to drink at least 64 ounces of water daily and will consider cutting back on her diet soda intake.   Activity was discussed and patient will consider adopting a more formal walking routine for at least 30 minutes 2-3 times a week to start.  Medications were discussed and patient is going to continue on bupropion and naltrexone as she is tolerating well and seeing weight loss success.         Reba was seen today for follow-up.    Diagnoses and all orders for this visit:    Class 2 severe obesity due to excess calories with serious comorbidity and body mass index (BMI) of 36.0 to 36.9 in adult ([facility])    Class 2 severe obesity due to excess calories with serious comorbidity and body mass index (BMI) of 38.0 to 38.9 in adult ([facility])  -     buPROPion (WELLBUTRIN XL) 150 mg 24 hr tablet; Take 1 tablet twice daily in the morning and evening  -     naltrexone (REVIA) 50 mg tablet; Take 1/2 tablet (25mg) twice daily in the morning and evening        Total time spent reviewing chart, interviewing patient, examining patient, discussing plan, answering all questions, and documentin minutes with >50% face-to-face time with the patient.    Follow up in approximately 2 months with Non-Surgical Physician/Advanced Practitioner.    Subjective:   Chief Complaint   Patient presents with    Follow-up     Pt is here for MWM f/u/ Waist - 38\"       Patient ID: Reba Mccarthy  is a 33 y.o. female with excess weight/obesity here to pursue weight management.  Patient is pursuing Conservative Program.   Most recent notes and records were reviewed.    HPI    Wt Readings from Last 20 Encounters:   24 94.4 kg (208 lb 3.2 oz)   24 96.9 kg (213 lb " 11.2 oz)   24 97 kg (213 lb 12.8 oz)   24 98 kg (216 lb)   24 98.8 kg (217 lb 12.8 oz)   24 97.5 kg (215 lb)   23 100 kg (221 lb)   23 100 kg (220 lb 6.4 oz)   23 100 kg (221 lb 3.2 oz)   23 100 kg (221 lb)   23 97.5 kg (215 lb)   23 94.1 kg (207 lb 6.4 oz)   23 93.1 kg (205 lb 3.2 oz)   23 95.4 kg (210 lb 6.4 oz)   23 97.2 kg (214 lb 3.2 oz)   23 97.2 kg (214 lb 3.2 oz)   23 96.8 kg (213 lb 6.4 oz)   23 98.4 kg (217 lb)   23 98.2 kg (216 lb 8 oz)   01/10/23 97.7 kg (215 lb 4.8 oz)       Patient presents today to medical weight management office for follow up.  Patient was started on bupropion/naltrexone and feels they have been helping her with her cravings. She has been able to lose some weight and is getting in better control of her eating habits. She does experience some dizziness at times but it is mild and only occasionally. She otherwise doesn't have side effects to the medication.      Weight loss medication and dose: Bupropion/naltrexone  Started weight and date: 213.8 lbs in 2024  Current weight: 208.2 lbs  Difference: -5.6 lbs  Goal weight: 180 lbs    Starting BMI: 37.87  Current BMI: 36.88    Waist Measurements:  2024: 40.5 in  2024: 38.5 in    Nutrition Prescription  Calories: 1,300-1,400 kcal  Protein: 63-78 g  Fluid: 61 ounces      Diet recall:  B: skips - coffee with hot cocoa powder  L: sandwich - ham and cheese with vegetables OR low italo wrap with ham and cheese  S: no  D: protein (chicken or pork) with rice or pasta with vegetables  S: oatmeal mug cake     Hydration: coffee with hot cocoa powder, diet soda - 1-2 cans, water - 64-80 oz  Alcohol: once every couple of week  Smoking: no  Exercise: occasionally walking  Occupation: stay at home mom  Sleep: varies  STOP ban/8          The following portions of the patient's history were reviewed and updated as appropriate: allergies, current  "medications, past family history, past medical history, past social history, past surgical history, and problem list.    Family History   Problem Relation Age of Onset    Hypertension Mother     Arthritis Mother     Hypothyroidism Mother     Stroke Father     Hypertension Father     Hypothyroidism Sister     Hypothyroidism Sister     Arthritis Maternal Grandmother     Cancer Maternal Grandfather     Stroke Paternal Grandmother     No Known Problems Paternal Grandfather     Breast cancer Neg Hx     Ovarian cancer Neg Hx     Cervical cancer Neg Hx     Uterine cancer Neg Hx         Review of Systems   Constitutional:  Negative for fatigue.   HENT:  Negative for sore throat.    Respiratory:  Negative for cough and shortness of breath.    Cardiovascular:  Negative for chest pain, palpitations and leg swelling.   Gastrointestinal:  Negative for abdominal pain, constipation, diarrhea and nausea.   Genitourinary:  Negative for dysuria.   Musculoskeletal:  Negative for arthralgias and back pain.   Skin:  Negative for rash.   Neurological:  Negative for headaches.   Psychiatric/Behavioral:  Negative for dysphoric mood. The patient is not nervous/anxious.        Objective:  /68 (BP Location: Left arm, Patient Position: Sitting, Cuff Size: Large)   Pulse 84   Ht 5' 3\" (1.6 m)   Wt 94.4 kg (208 lb 3.2 oz)   LMP 07/07/2024 (Exact Date)   BMI 36.88 kg/m²     Physical Exam  Vitals and nursing note reviewed.   Constitutional:       Appearance: Normal appearance. She is obese.   HENT:      Head: Normocephalic.   Pulmonary:      Effort: Pulmonary effort is normal.   Neurological:      General: No focal deficit present.      Mental Status: She is alert and oriented to person, place, and time.   Psychiatric:         Mood and Affect: Mood normal.         Behavior: Behavior normal.         Thought Content: Thought content normal.         Judgment: Judgment normal.            Labs   Most recent labs reviewed   Lab Results "   Component Value Date    SODIUM 139 11/22/2023    K 3.8 11/22/2023     11/22/2023    CO2 28 11/22/2023    AGAP 8 11/22/2023    BUN 7 11/22/2023    CREATININE 0.70 11/22/2023    GLUC 103 02/23/2023    GLUF 93 11/22/2023    CALCIUM 9.2 11/22/2023    AST 11 (L) 03/22/2023    ALT 10 03/22/2023    ALKPHOS 78 03/22/2023    TP 6.8 03/22/2023    TBILI 0.69 03/22/2023    EGFR 112 11/22/2023     Lab Results   Component Value Date    HGBA1C 6.0 (H) 11/22/2023     Lab Results   Component Value Date    SYS4RTBGHBZL 2.743 11/22/2023    TSH 2.14 11/24/2021     Lab Results   Component Value Date    CHOLESTEROL 147 11/22/2023     Lab Results   Component Value Date    HDL 39 (L) 11/22/2023     Lab Results   Component Value Date    TRIG 69 11/22/2023     Lab Results   Component Value Date    LDLCALC 94 11/22/2023

## 2024-07-25 NOTE — ASSESSMENT & PLAN NOTE
- Patient is pursuing Conservative Program and follow up visits with medical weight management provider  - Initial weight loss goal of 5-10% weight loss for improved health. Weight loss can improve patient's co-morbid conditions and/or prevent weight-related complications.  - Explained the importance of continuing lifestyle changes in addition to any anti-obesity medications.   - Labs reviewed:    General Recommendations:  Nutrition:  Eat breakfast daily.  Do not skip meals.      Food log (ie.) [email or web address], sparkpeople.com, loseit.com, calorieking.com, etc.     Practice mindful eating.  Be sure to set aside time to eat, eat slowly, and savor your food.     Hydration:    At least 64oz of water daily.  No sugar sweetened beverages.  No juice (eat the fruit instead).     Exercise:  Studies have shown that the ideal exercise goal is somewhere between 150 to 300 minutes of moderate intensity exercise a week.  Start with exercising 10 minutes every other day and gradually increase physical activity with a goal of at least 150 minutes of moderate intensity exercise a week, divided over at least 3 days a week.  An example of this would be exercising 30 minutes a day, 5 days a week.  Resistance training can increase muscle mass and increase our resting metabolic rate.   FULL BODY resistance training is recommended 2-3 times a week.  Do not do this on consecutive days to allow for muscle recovery.     Aim for a bare minimum 5000 steps, even on days you do not exercise.     Monitoring:   Weigh yourself daily.  If this causes undue stress, then just weigh yourself once a week.  Weigh yourself the same time of the day with the same amount of clothing on.  Preferably this should be done after waking up, before you eat, and with no clothing or minimal clothing on.     Specific Goals:  Patient lifestyle habits were reviewed and patient was congratulated on her weight loss since last office visit. Nutrition was discussed  and she will continue to avoid skipping meals and try to balance her calories. She will focus on protein rich snacks.  Hydration was discussed and patient will continue to drink at least 64 ounces of water daily and will consider cutting back on her diet soda intake.   Activity was discussed and patient will consider adopting a more formal walking routine for at least 30 minutes 2-3 times a week to start.  Medications were discussed and patient is going to continue on bupropion and naltrexone as she is tolerating well and seeing weight loss success.

## 2024-08-06 ENCOUNTER — APPOINTMENT (OUTPATIENT)
Dept: LAB | Facility: CLINIC | Age: 34
End: 2024-08-06
Payer: COMMERCIAL

## 2024-08-06 DIAGNOSIS — E66.01 CLASS 2 SEVERE OBESITY DUE TO EXCESS CALORIES WITH SERIOUS COMORBIDITY AND BODY MASS INDEX (BMI) OF 38.0 TO 38.9 IN ADULT (HCC): ICD-10-CM

## 2024-08-06 DIAGNOSIS — D75.839 THROMBOCYTHEMIA: ICD-10-CM

## 2024-08-06 DIAGNOSIS — R73.03 PREDIABETES: ICD-10-CM

## 2024-08-06 LAB
ANION GAP SERPL CALCULATED.3IONS-SCNC: 9 MMOL/L (ref 4–13)
BASOPHILS # BLD AUTO: 0.06 THOUSANDS/ÂΜL (ref 0–0.1)
BASOPHILS NFR BLD AUTO: 1 % (ref 0–1)
BUN SERPL-MCNC: 8 MG/DL (ref 5–25)
CALCIUM SERPL-MCNC: 9.1 MG/DL (ref 8.4–10.2)
CHLORIDE SERPL-SCNC: 103 MMOL/L (ref 96–108)
CO2 SERPL-SCNC: 24 MMOL/L (ref 21–32)
CREAT SERPL-MCNC: 0.88 MG/DL (ref 0.6–1.3)
EOSINOPHIL # BLD AUTO: 0.15 THOUSAND/ÂΜL (ref 0–0.61)
EOSINOPHIL NFR BLD AUTO: 2 % (ref 0–6)
ERYTHROCYTE [DISTWIDTH] IN BLOOD BY AUTOMATED COUNT: 13.6 % (ref 11.6–15.1)
EST. AVERAGE GLUCOSE BLD GHB EST-MCNC: 111 MG/DL
GFR SERPL CREATININE-BSD FRML MDRD: 85 ML/MIN/1.73SQ M
GLUCOSE P FAST SERPL-MCNC: 91 MG/DL (ref 65–99)
HBA1C MFR BLD: 5.5 %
HCT VFR BLD AUTO: 38.7 % (ref 34.8–46.1)
HGB BLD-MCNC: 12.3 G/DL (ref 11.5–15.4)
IMM GRANULOCYTES # BLD AUTO: 0.02 THOUSAND/UL (ref 0–0.2)
IMM GRANULOCYTES NFR BLD AUTO: 0 % (ref 0–2)
LYMPHOCYTES # BLD AUTO: 2.62 THOUSANDS/ÂΜL (ref 0.6–4.47)
LYMPHOCYTES NFR BLD AUTO: 32 % (ref 14–44)
MCH RBC QN AUTO: 28 PG (ref 26.8–34.3)
MCHC RBC AUTO-ENTMCNC: 31.8 G/DL (ref 31.4–37.4)
MCV RBC AUTO: 88 FL (ref 82–98)
MONOCYTES # BLD AUTO: 0.59 THOUSAND/ÂΜL (ref 0.17–1.22)
MONOCYTES NFR BLD AUTO: 7 % (ref 4–12)
NEUTROPHILS # BLD AUTO: 4.73 THOUSANDS/ÂΜL (ref 1.85–7.62)
NEUTS SEG NFR BLD AUTO: 58 % (ref 43–75)
NRBC BLD AUTO-RTO: 0 /100 WBCS
PLATELET # BLD AUTO: 403 THOUSANDS/UL (ref 149–390)
PMV BLD AUTO: 8.6 FL (ref 8.9–12.7)
POTASSIUM SERPL-SCNC: 3.7 MMOL/L (ref 3.5–5.3)
RBC # BLD AUTO: 4.39 MILLION/UL (ref 3.81–5.12)
SODIUM SERPL-SCNC: 136 MMOL/L (ref 135–147)
WBC # BLD AUTO: 8.17 THOUSAND/UL (ref 4.31–10.16)

## 2024-08-06 PROCEDURE — 36415 COLL VENOUS BLD VENIPUNCTURE: CPT

## 2024-08-06 PROCEDURE — 85025 COMPLETE CBC W/AUTO DIFF WBC: CPT

## 2024-08-06 PROCEDURE — 83036 HEMOGLOBIN GLYCOSYLATED A1C: CPT

## 2024-08-06 PROCEDURE — 80048 BASIC METABOLIC PNL TOTAL CA: CPT

## 2024-08-07 ENCOUNTER — OFFICE VISIT (OUTPATIENT)
Dept: INTERNAL MEDICINE CLINIC | Facility: CLINIC | Age: 34
End: 2024-08-07
Payer: COMMERCIAL

## 2024-08-07 VITALS
HEART RATE: 82 BPM | RESPIRATION RATE: 19 BRPM | DIASTOLIC BLOOD PRESSURE: 72 MMHG | WEIGHT: 203 LBS | OXYGEN SATURATION: 98 % | SYSTOLIC BLOOD PRESSURE: 120 MMHG | BODY MASS INDEX: 35.97 KG/M2 | HEIGHT: 63 IN

## 2024-08-07 DIAGNOSIS — E66.01 CLASS 2 SEVERE OBESITY DUE TO EXCESS CALORIES WITH SERIOUS COMORBIDITY AND BODY MASS INDEX (BMI) OF 36.0 TO 36.9 IN ADULT (HCC): ICD-10-CM

## 2024-08-07 DIAGNOSIS — D75.839 THROMBOCYTHEMIA: ICD-10-CM

## 2024-08-07 DIAGNOSIS — Z00.00 ANNUAL PHYSICAL EXAM: Primary | ICD-10-CM

## 2024-08-07 PROBLEM — R73.03 PREDIABETES: Status: RESOLVED | Noted: 2023-11-27 | Resolved: 2024-08-07

## 2024-08-07 PROCEDURE — 99395 PREV VISIT EST AGE 18-39: CPT | Performed by: INTERNAL MEDICINE

## 2024-08-07 NOTE — PATIENT INSTRUCTIONS
"Patient Education     Routine physical for adults   The Basics   Written by the doctors and editors at [facility]   What is a physical? -- A physical is a routine visit, or \"check-up,\" with your doctor. You might also hear it called a \"wellness visit\" or \"preventive visit.\"  During each visit, the doctor will:   Ask about your physical and mental health   Ask about your habits, behaviors, and lifestyle   Do an exam   Give you vaccines if needed   Talk to you about any medicines you take   Give advice about your health   Answer your questions  Getting regular check-ups is an important part of taking care of your health. It can help your doctor find and treat any problems you have. But it's also important for preventing health problems.  A routine physical is different from a \"sick visit.\" A sick visit is when you see a doctor because of a health concern or problem. Since physicals are scheduled ahead of time, you can think about what you want to ask the doctor.  How often should I get a physical? -- It depends on your age and health. In general, for people age 21 years and older:   If you are younger than 50 years, you might be able to get a physical every 3 years.   If you are 50 years or older, your doctor might recommend a physical every year.  If you have an ongoing health condition, like diabetes or high blood pressure, your doctor will probably want to see you more often.  What happens during a physical? -- In general, each visit will include:   Physical exam - The doctor or nurse will check your height, weight, heart rate, and blood pressure. They will also look at your eyes and ears. They will ask about how you are feeling and whether you have any symptoms that bother you.   Medicines - It's a good idea to bring a list of all the medicines you take to each doctor visit. Your doctor will talk to you about your medicines and answer any questions. Tell them if you are having any side effects that bother you. You " "should also tell them if you are having trouble paying for any of your medicines.   Habits and behaviors - This includes:   Your diet   Your exercise habits   Whether you smoke, drink alcohol, or use drugs   Whether you are sexually active   Whether you feel safe at home  Your doctor will talk to you about things you can do to improve your health and lower your risk of health problems. They will also offer help and support. For example, if you want to quit smoking, they can give you advice and might prescribe medicines. If you want to improve your diet or get more physical activity, they can help you with this, too.   Lab tests, if needed - The tests you get will depend on your age and situation. For example, your doctor might want to check your:   Cholesterol   Blood sugar   Iron level   Vaccines - The recommended vaccines will depend on your age, health, and what vaccines you already had. Vaccines are very important because they can prevent certain serious or deadly infections.   Discussion of screening - \"Screening\" means checking for diseases or other health problems before they cause symptoms. Your doctor can recommend screening based on your age, risk, and preferences. This might include tests to check for:   Cancer, such as breast, prostate, cervical, ovarian, colorectal, prostate, lung, or skin cancer   Sexually transmitted infections, such as chlamydia and gonorrhea   Mental health conditions like depression and anxiety  Your doctor will talk to you about the different types of screening tests. They can help you decide which screenings to have. They can also explain what the results might mean.   Answering questions - The physical is a good time to ask the doctor or nurse questions about your health. If needed, they can refer you to other doctors or specialists, too.  Adults older than 65 years often need other care, too. As you get older, your doctor will talk to you about:   How to prevent falling at " home   Hearing or vision tests   Memory testing   How to take your medicines safely   Making sure that you have the help and support you need at home  All topics are updated as new evidence becomes available and our peer review process is complete.  This topic retrieved from [employer] on: May 02, 2024.  Topic 059708 Version 1.0  Release: 32.4.3 - C32.122  © 2024 UpToDate[employer] and/or its affiliates. All rights reserved.  Consumer Information Use and Disclaimer   Disclaimer: This generalized information is a limited summary of diagnosis, treatment, and/or medication information. It is not meant to be comprehensive and should be used as a tool to help the user understand and/or assess potential diagnostic and treatment options. It does NOT include all information about conditions, treatments, medications, side effects, or risks that may apply to a specific patient. It is not intended to be medical advice or a substitute for the medical advice, diagnosis, or treatment of a health care provider based on the health care provider's examination and assessment of a patient's specific and unique circumstances. Patients must speak with a health care provider for complete information about their health, medical questions, and treatment options, including any risks or benefits regarding use of medications. This information does not endorse any treatments or medications as safe, effective, or approved for treating a specific patient. Up[employer]Date, Inc[employer] and its affiliates disclaim any warranty or liability relating to this information or the use thereof.The use of this information is governed by the Terms of Use, available at https://www.woltersBlink Messengeruwer.com/en/know/clinical-effectiveness-terms. 2024© Up[employer]Date, Inc. and its affiliates and/or licensors. All rights reserved.  Copyright   © 2024 [employer] and/or its affiliates. All rights reserved.

## 2024-08-07 NOTE — PROGRESS NOTES
Adult Annual Physical  Name: Reba Mccarthy      : 1990      MRN: [id number]  Encounter Provider: Soheila Hyman DO  Encounter Date: 2024   Encounter department: [facility] INTERNAL MEDICINE    Assessment & Plan   1. Annual physical exam  2. Thrombocythemia  Assessment & Plan:  -platelet counts decreasing  -monitor  Orders:  -     CBC and differential; Future; Expected date: 2024  -     Basic metabolic panel; Future; Expected date: 2024  3. Class 2 severe obesity due to excess calories with serious comorbidity and body mass index (BMI) of 36.0 to 36.9 in adult (HCC)  Assessment & Plan:  -BMI now 35 with bupropion and naltrexone per Weight loss Program    Immunizations and preventive care screenings were discussed with patient today. Appropriate education was printed on patient's after visit summary.    Counseling:  Alcohol/drug use: discussed moderation in alcohol intake, the recommendations for healthy alcohol use, and avoidance of illicit drug use.  Dental Health: discussed importance of regular tooth brushing, flossing, and dental visits.  Exercise: the importance of regular exercise/physical activity was discussed. Recommend exercise 3-5 times per week for at least 30 minutes.   Immunizations discussed.  Recommend yearly influenza vaccine, updated COVID and HPV series.  She is up to date with tdap.  Cancer screenings discussed.  PAP per GYN.         History of Present Illness     Adult Annual Physical:  Patient presents for annual physical.   Her mom visited and did not follow her diet. .     Diet and Physical Activity:  - Diet/Nutrition: portion control.  - Exercise: no formal exercise.    General Health:  - Sleep: sleeps well.  - Hearing: normal hearing bilateral ears.  - Vision: most recent eye exam > 1 year ago and wears glasses.  - Dental: no dental visits for > 1 year.    /GYN Health:  - Follows with GYN: yes.   - Menopause: premenopausal.     Advanced  Care Planning:  - Has an advanced directive?: no    - Has a durable medical POA?: no    - ACP document given to patient?: yes      Review of Systems   Constitutional:  Positive for appetite change. Negative for activity change.        Intentional weight loss   HENT:  Negative for congestion, postnasal drip and rhinorrhea.    Respiratory:  Negative for cough, shortness of breath and wheezing.    Cardiovascular:  Negative for chest pain, palpitations and leg swelling.   Gastrointestinal:  Negative for constipation, diarrhea, nausea and vomiting.   Genitourinary:  Negative for difficulty urinating and menstrual problem.   Musculoskeletal:  Positive for arthralgias (R wrist).   Neurological:  Negative for dizziness, light-headedness and headaches.   Psychiatric/Behavioral:  Negative for dysphoric mood and sleep disturbance. The patient is not nervous/anxious.      Medical History Reviewed by provider this encounter:  Tobacco  Allergies  Meds  Problems  Med Hx  Surg Hx  Fam Hx       Current Outpatient Medications on File Prior to Visit   Medication Sig Dispense Refill   • Advair Diskus 100-50 MCG/ACT inhaler INHALE 1 PUFF 2 TIMES A DAY RINSE MOUTH AFTER USE. 180 blister 1   • albuterol (PROVENTIL HFA,VENTOLIN HFA) 90 mcg/act inhaler TAKE 2 PUFFS BY MOUTH EVERY 4 HOURS AS NEEDED FOR WHEEZE 8.5 g 1   • Azelastine HCl 137 MCG/SPRAY SOLN SPRAY 1 SPRAY INTO EACH NOSTRIL TWICE A DAY 90 mL 1   • budesonide (RINOCORT AQUA) 32 MCG/ACT nasal spray 1 spray into each nostril daily     • buPROPion (WELLBUTRIN XL) 150 mg 24 hr tablet Take 1 tablet twice daily in the morning and evening 180 tablet 0   • naltrexone (REVIA) 50 mg tablet Take 1/2 tablet (25mg) twice daily in the morning and evening 30 tablet 1   • Polyethylene Glycol 3350 (MIRALAX PO) Take by mouth       No current facility-administered medications on file prior to visit.      Social History     Tobacco Use   • Smoking status: Never   • Smokeless tobacco: Never  "  Vaping Use   • Vaping status: Never Used   Substance and Sexual Activity   • Alcohol use: Not Currently     Comment: rarely   • Drug use: Not Currently     Comment: tiffanieuna - 1 year ago   • Sexual activity: Yes     Partners: Male     Birth control/protection: Male Sterilization     Comment: vasectomy       Objective     /72 (BP Location: Left arm, Patient Position: Sitting, Cuff Size: Large)   Pulse 82   Resp 19   Ht 5' 3\" (1.6 m)   Wt 92.1 kg (203 lb)   LMP 07/07/2024 (Exact Date)   SpO2 98%   BMI 35.96 kg/m²     Physical Exam  Vitals reviewed.   Constitutional:       General: She is not in acute distress.     Appearance: She is obese.   HENT:      Head: Normocephalic.      Right Ear: Tympanic membrane and ear canal normal.      Left Ear: Tympanic membrane and ear canal normal.      Nose: Nose normal.      Mouth/Throat:      Mouth: Mucous membranes are moist.   Eyes:      Extraocular Movements: Extraocular movements intact.      Conjunctiva/sclera: Conjunctivae normal.      Pupils: Pupils are equal, round, and reactive to light.      Comments: Wears glasses   Neck:      Thyroid: No thyromegaly or thyroid tenderness.   Cardiovascular:      Rate and Rhythm: Normal rate and regular rhythm.      Pulses: Normal pulses.      Heart sounds: Normal heart sounds.   Pulmonary:      Effort: Pulmonary effort is normal. No respiratory distress.      Breath sounds: Normal breath sounds. No wheezing.   Abdominal:      General: Bowel sounds are normal. There is no distension.      Palpations: Abdomen is soft.      Tenderness: There is no abdominal tenderness.   Musculoskeletal:      Cervical back: Neck supple. No tenderness.      Right lower leg: No edema.      Left lower leg: No edema.   Lymphadenopathy:      Cervical: No cervical adenopathy.   Skin:     Coloration: Skin is not pale.   Neurological:      General: No focal deficit present.      Mental Status: She is alert and oriented to person, place, and time. "   Psychiatric:         Mood and Affect: Mood normal.         Recent Results (from the past 504 hour(s))   Hemoglobin A1C    Collection Time: 08/06/24  7:58 AM   Result Value Ref Range    Hemoglobin A1C 5.5 Normal 4.0-5.6%; PreDiabetic 5.7-6.4%; Diabetic >=6.5%; Glycemic control for adults with diabetes <7.0% %     mg/dl   Basic metabolic panel    Collection Time: 08/06/24  7:58 AM   Result Value Ref Range    Sodium 136 135 - 147 mmol/L    Potassium 3.7 3.5 - 5.3 mmol/L    Chloride 103 96 - 108 mmol/L    CO2 24 21 - 32 mmol/L    ANION GAP 9 4 - 13 mmol/L    BUN 8 5 - 25 mg/dL    Creatinine 0.88 0.60 - 1.30 mg/dL    Glucose, Fasting 91 65 - 99 mg/dL    Calcium 9.1 8.4 - 10.2 mg/dL    eGFR 85 ml/min/1.73sq m   CBC and differential    Collection Time: 08/06/24  7:58 AM   Result Value Ref Range    WBC 8.17 4.31 - 10.16 Thousand/uL    RBC 4.39 3.81 - 5.12 Million/uL    Hemoglobin 12.3 11.5 - 15.4 g/dL    Hematocrit 38.7 34.8 - 46.1 %    MCV 88 82 - 98 fL    MCH 28.0 26.8 - 34.3 pg    MCHC 31.8 31.4 - 37.4 g/dL    RDW 13.6 11.6 - 15.1 %    MPV 8.6 (L) 8.9 - 12.7 fL    Platelets 403 (H) 149 - 390 Thousands/uL    nRBC 0 /100 WBCs    Segmented % 58 43 - 75 %    Immature Grans % 0 0 - 2 %    Lymphocytes % 32 14 - 44 %    Monocytes % 7 4 - 12 %    Eosinophils Relative 2 0 - 6 %    Basophils Relative 1 0 - 1 %    Absolute Neutrophils 4.73 1.85 - 7.62 Thousands/µL    Absolute Immature Grans 0.02 0.00 - 0.20 Thousand/uL    Absolute Lymphocytes 2.62 0.60 - 4.47 Thousands/µL    Absolute Monocytes 0.59 0.17 - 1.22 Thousand/µL    Eosinophils Absolute 0.15 0.00 - 0.61 Thousand/µL    Basophils Absolute 0.06 0.00 - 0.10 Thousands/µL

## 2024-08-22 ENCOUNTER — TELEPHONE (OUTPATIENT)
Age: 34
End: 2024-08-22

## 2024-08-22 NOTE — TELEPHONE ENCOUNTER
Contacted patient off of Medication Management  to verify needs of services in attempts to offer patient an appointment at available locations. LVM for patient to contact intake dept  in regards to wait list.     1st attempt

## 2024-08-30 ENCOUNTER — TELEPHONE (OUTPATIENT)
Age: 34
End: 2024-08-30

## 2024-08-30 NOTE — TELEPHONE ENCOUNTER
Contacted patient off of Medication Management  and Talk Therapy  to verify needs of services in attempts to offer patient an appointment. LVM for patient to contact intake dept  in regards to update wait list and offer possible appointment if available. 1st attempt.

## 2024-08-30 NOTE — TELEPHONE ENCOUNTER
Patient returned called stating she would like to remain on the wait list until an appointment becomes available near [place] for Talk Therapy.

## 2024-09-06 ENCOUNTER — TELEPHONE (OUTPATIENT)
Age: 34
End: 2024-09-06

## 2024-09-06 NOTE — TELEPHONE ENCOUNTER
Contacted patient off of Talk Therapy  to verify needs of services in attempts to offer patient an appointment. LVM for patient to contact intake dept  in regards to offer possible talk therapy appointment at [phone] opt 3.

## 2024-09-15 DIAGNOSIS — E66.01 CLASS 2 SEVERE OBESITY DUE TO EXCESS CALORIES WITH SERIOUS COMORBIDITY AND BODY MASS INDEX (BMI) OF 38.0 TO 38.9 IN ADULT (HCC): ICD-10-CM

## 2024-09-16 RX ORDER — BUPROPION HYDROCHLORIDE 150 MG/1
150 TABLET ORAL EVERY MORNING
Qty: 90 TABLET | Refills: 0 | Status: SHIPPED | OUTPATIENT
Start: 2024-09-16 | End: 2024-12-15

## 2024-10-08 ENCOUNTER — OFFICE VISIT (OUTPATIENT)
Age: 34
End: 2024-10-08
Payer: COMMERCIAL

## 2024-10-08 VITALS — TEMPERATURE: 98.7 F

## 2024-10-08 DIAGNOSIS — B34.9 VIRAL ILLNESS: Primary | ICD-10-CM

## 2024-10-08 DIAGNOSIS — E66.812 CLASS 2 OBESITY DUE TO EXCESS CALORIES WITH BODY MASS INDEX (BMI) OF 36.0 TO 36.9 IN ADULT, UNSPECIFIED WHETHER SERIOUS COMORBIDITY PRESENT: ICD-10-CM

## 2024-10-08 DIAGNOSIS — J45.20 MILD INTERMITTENT ASTHMA WITHOUT COMPLICATION: ICD-10-CM

## 2024-10-08 DIAGNOSIS — E66.09 CLASS 2 OBESITY DUE TO EXCESS CALORIES WITH BODY MASS INDEX (BMI) OF 36.0 TO 36.9 IN ADULT, UNSPECIFIED WHETHER SERIOUS COMORBIDITY PRESENT: ICD-10-CM

## 2024-10-08 DIAGNOSIS — R19.7 DIARRHEA, UNSPECIFIED TYPE: ICD-10-CM

## 2024-10-08 PROCEDURE — 99213 OFFICE O/P EST LOW 20 MIN: CPT | Performed by: INTERNAL MEDICINE

## 2024-10-08 NOTE — PROGRESS NOTES
INTERNAL MEDICINE OFFICE VISIT       NAME: Reba Mccarthy  AGE: 33 y.o. SEX: female       : 1990        MRN: [id number]    DATE: 10/8/2024  TIME: 11:09 AM    Assessment and Plan   1. Viral illness  -Patient with 1 day history of Sore throat, cough, congestion, nausea, diarrhea and headache  -COVID and FLU negative  -Patient 21 month old daughter started  Thursday with positive sick contacts; daughter became ill on Friday; patient ill on Monday.  -History of asthma for which she uses rescue inhaler a few times per week; she required inhaler twice in one day yesterday  -Patient took cold medicine yesterday with acetaminophen component  -Patient had one dizzy spell this morning without loss of balance or fall.  -Likely viral illness from     PLAN  -Patient informed of supportive management; can continue with cold medication and to hydrate  -As patient has GI symptoms; do not prepare food for anyone  -Patient educated that she is considered contagious until she is better plus one day.  -Patient to continue with rescue inhaler as needed; does not require refill at this time.  -Patient instructed to call if she needs a work excuse.  -Patient pending BMP, CBC    2. Mild intermittent asthma without complication  Patient well controlled with rescue inhaler use few times per week  Required increased inhaler use of twice per day yesterday in setting of viral illness  Patient does not currently require refill  Does not endorse shortness of breath at rest or on exertion    PLAN  -Continue with rescue inhaler as needed  -Continue with supportive management for viral illness    3. Class 2 obesity due to excess calories with body mass index (BMI) of 36.0 to 36.9 in adult, unspecified whether serious comorbidity present  Patient BMI currently 35.96  No new dietary changes  HbA1c 5.5    PLAN  -Dietary management and exercise recommended      4. Diarrhea, unspecified type  Patient endorsing 2 loose  stools per day in setting of suspected viral illness  No other alarm symptoms or conditions    PLAN  -Continue with supportive management for viral illness  -Insure adequate hydration while GI symptoms present.  -Avoid preparing food for anyone while GI symptoms present.         There are no Patient Instructions on file for this visit.        Chief Complaint   No chief complaint on file.      History of Present Illness   Reba Mccarthy is a 33 y.o.-year-old female with history of Asthma and obesity who presents for sick visit.  Patient symptoms started yesterday consisting of Sore throat, cough, congestion, nausea, non-bloody diarrhea.  Positive for sick contacts; 21 month daughter is sick after starting  on Thursday with similar symptoms.  Patient is endorsing positive headache rated 4/10 as pressure sensation and with room spinning sensation this morning without loss of balance or fall.  Patient symptoms had improved post cold medication.  Patient instructed that likely viral illness and will need to run its course; recommended supportive management with cold medication and hydration.    Review of Systems   Review of Systems   Constitutional:  Negative for chills and fever.   HENT:  Positive for sore throat.    Eyes:  Negative for pain, discharge, redness and visual disturbance.   Respiratory:  Negative for cough and shortness of breath.    Cardiovascular:  Negative for chest pain and palpitations.   Gastrointestinal:  Positive for diarrhea. Negative for abdominal pain, constipation and vomiting.   Genitourinary:  Negative for dysuria and hematuria.   Musculoskeletal:  Negative for arthralgias and back pain.   Neurological:  Positive for dizziness and headaches. Negative for tremors, syncope, facial asymmetry and weakness.   All other systems reviewed and are negative.      Active Problem List     Patient Active Problem List   Diagnosis    Mild intermittent asthma without complication    Eczema     Allergic rhinitis    Class 2 obesity due to excess calories with body mass index (BMI) of 36.0 to 36.9 in adult    Left ovarian cyst    Status post primary low transverse  section    History of insulin controlled gestational diabetes mellitus    Chest wall pain    Anxiety    Constipation    Thrombocythemia       The following portions of the patient's history were reviewed and updated as appropriate: allergies, current medications, past family history, past medical history, past social history, past surgical history, and problem list.    Objective     Vitals:    10/08/24 1041   Temp: 98.7 °F (37.1 °C)     Wt Readings from Last 3 Encounters:   24 92.1 kg (203 lb)   24 94.4 kg (208 lb 3.2 oz)   24 96.9 kg (213 lb 11.2 oz)       Physical Exam  Constitutional:       General: She is not in acute distress.  HENT:      Head: Normocephalic and atraumatic.   Cardiovascular:      Rate and Rhythm: Normal rate and regular rhythm.      Pulses: Normal pulses.      Heart sounds: No murmur heard.  Pulmonary:      Effort: Pulmonary effort is normal.      Breath sounds: Normal breath sounds. No wheezing.   Abdominal:      Palpations: Abdomen is soft.      Tenderness: There is no abdominal tenderness.   Musculoskeletal:         General: No swelling or tenderness.      Cervical back: Normal range of motion.   Skin:     General: Skin is warm and dry.   Neurological:      Mental Status: She is alert and oriented to person, place, and time.         Pertinent Laboratory/Diagnostic Studies:  Most Recent Labs:   Lab Results   Component Value Date    WBC 8.17 2024    RBC 4.39 2024    HGB 12.3 2024    HCT 38.7 2024     (H) 2024    RDW 13.6 2024    NEUTROABS 4.73 2024    SODIUM 136 2024    K 3.7 2024     2024    CO2 24 2024    BUN 8 2024    CREATININE 0.88 2024    GLUC 103 2023    GLUF 91 2024    CALCIUM 9.1  08/06/2024    AST 11 (L) 03/22/2023    ALT 10 03/22/2023    ALKPHOS 78 03/22/2023    TP 6.8 03/22/2023    ALB 4.1 03/22/2023    TBILI 0.69 03/22/2023    CHOLESTEROL 147 11/22/2023    HDL 39 (L) 11/22/2023    TRIG 69 11/22/2023    LDLCALC 94 11/22/2023    NONHDLC 108 11/22/2023    OGD6KHNBIBLR 2.743 11/22/2023    HCGQUANT 47,607 (H) 07/29/2022    RPR Non-Reactive 01/24/2023    HGBA1C 5.5 08/06/2024     08/06/2024         No orders of the defined types were placed in this encounter.      ALLERGIES:  Allergies   Allergen Reactions    Shellfish Allergy - Food Allergy Hives, Itching and Swelling       Current Medications     Current Outpatient Medications   Medication Sig Dispense Refill    Advair Diskus 100-50 MCG/ACT inhaler INHALE 1 PUFF 2 TIMES A DAY RINSE MOUTH AFTER USE. 180 blister 1    albuterol (PROVENTIL HFA,VENTOLIN HFA) 90 mcg/act inhaler TAKE 2 PUFFS BY MOUTH EVERY 4 HOURS AS NEEDED FOR WHEEZE 8.5 g 1    Azelastine HCl 137 MCG/SPRAY SOLN SPRAY 1 SPRAY INTO EACH NOSTRIL TWICE A DAY 90 mL 1    budesonide (RINOCORT AQUA) 32 MCG/ACT nasal spray 1 spray into each nostril daily      buPROPion (WELLBUTRIN XL) 150 mg 24 hr tablet Take 1 tablet (150 mg total) by mouth every morning Take 1 tablet twice daily in the morning and evening 90 tablet 0    naltrexone (REVIA) 50 mg tablet Take 1/2 tablet (25mg) twice daily in the morning and evening 30 tablet 1    Polyethylene Glycol 3350 (MIRALAX PO) Take by mouth       No current facility-administered medications for this visit.         Health Maintenance        Deonte Ross MD

## 2024-10-13 DIAGNOSIS — E66.812 CLASS 2 SEVERE OBESITY DUE TO EXCESS CALORIES WITH SERIOUS COMORBIDITY AND BODY MASS INDEX (BMI) OF 38.0 TO 38.9 IN ADULT (HCC): ICD-10-CM

## 2024-10-13 DIAGNOSIS — E66.01 CLASS 2 SEVERE OBESITY DUE TO EXCESS CALORIES WITH SERIOUS COMORBIDITY AND BODY MASS INDEX (BMI) OF 38.0 TO 38.9 IN ADULT (HCC): ICD-10-CM

## 2024-10-14 RX ORDER — NALTREXONE HYDROCHLORIDE 50 MG/1
TABLET, FILM COATED ORAL
Qty: 30 TABLET | Refills: 0 | Status: SHIPPED | OUTPATIENT
Start: 2024-10-14

## 2024-10-16 ENCOUNTER — TELEPHONE (OUTPATIENT)
Age: 34
End: 2024-10-16

## 2024-10-16 DIAGNOSIS — E66.812 CLASS 2 SEVERE OBESITY DUE TO EXCESS CALORIES WITH SERIOUS COMORBIDITY AND BODY MASS INDEX (BMI) OF 38.0 TO 38.9 IN ADULT (HCC): ICD-10-CM

## 2024-10-16 DIAGNOSIS — E66.01 CLASS 2 SEVERE OBESITY DUE TO EXCESS CALORIES WITH SERIOUS COMORBIDITY AND BODY MASS INDEX (BMI) OF 38.0 TO 38.9 IN ADULT (HCC): ICD-10-CM

## 2024-10-16 NOTE — TELEPHONE ENCOUNTER
Contacted patient off the wait list to offer appointment for talk therapy. Pt is at work and cannot answer questions at this time. Pt will call back later to do intake. 2nd attempt. Removing off the wait list.

## 2024-10-16 NOTE — TELEPHONE ENCOUNTER
"Behavioral Health Outpatient Intake Questions    Referred By   : PCP    Please advise interviewee that they need to answer all questions truthfully to allow for best care, and any misrepresentations of information may affect their ability to be seen at this clinic   => Was this discussed? Yes     If Minor Child (under age 18)    Who is/are the legal guardian(s) of the child?     Is there a custody agreement? N/A     If \"YES\"- Custody orders must be obtained prior to scheduling the first appointment  In addition, Consent to Treatment must be signed by all legal guardians prior to scheduling the first appointment    If \"NO\"- Consent to Treatment must be signed by all legal guardians prior to scheduling the first appointment    Behavioral Health Outpatient Intake History -     Presenting Problem (in patient's own words): Anxiety     Are there any communication barriers for this patient?                                                    If yes, please describe barriers:   If there is a unique situation, please refer to Burak Breaux/Rochelle Cota for final determination.    Are you taking any psychiatric medications?      If \"YES\" -What are they      If \"YES\" -Who prescribes?     Has the Patient previously received outpatient Talk Therapy or Medication Management from [facility]          If \"YES\"- When, Where and with Whom?         If \"NO\" -Has Patient received these services elsewhere?       If \"YES\" -When, Where, and with Whom?    Has the Patient abused alcohol or other substances in the last 6 months ? {YES/NO OPTIONAL (Optional):87302}       If \"YES\" -What substance, How much, How often?***     If illegal substance: Refer to [facility] (for ZULAY) or [facility] Off[facility].   If Alcohol in excess of 10 drinks per week:  Refer to [facility] (for ZULAY) or [facility] Offices    Legal History-     Is this treatment court ordered? {YES/NO OPTIONAL (Optional):64410}   If \"yes \"send to :  Talk Therapy : Send to Burak [place]en[place]ro " "Beata for final determination   Med Management: Send to Dr Petty for final determination     Has the Patient been convicted of a felony?  {YES/NO OPTIONAL (Optional):85228}   If \"Yes\" send to -When, What?***  Talk Therapy: Send to Burak Breaux/Rochelle Cota for final determination   Med Management: Send to Dr Petty for final determination     ACCEPTED as a patient {YES/NO OPTIONAL (Optional):08836}  If \"Yes\" Appointment Date: ***    Referred Elsewhere? {YES/NO OPTIONAL (Optional):44167}  If “Yes” - (Where? Ex: [facility], [facility], [facility], [facility], etc.) ***      Name of Insurance Co:***  Insurance ID#***  Insurance Phone #***  If ins is primary or secondary?***  If patient is a minor, parents information such as Name, D.O.B of guarantor.  "

## 2024-10-17 RX ORDER — BUPROPION HYDROCHLORIDE 150 MG/1
TABLET ORAL
Qty: 60 TABLET | Refills: 0 | Status: SHIPPED | OUTPATIENT
Start: 2024-10-17

## 2024-11-15 DIAGNOSIS — E66.01 CLASS 2 SEVERE OBESITY DUE TO EXCESS CALORIES WITH SERIOUS COMORBIDITY AND BODY MASS INDEX (BMI) OF 38.0 TO 38.9 IN ADULT (HCC): ICD-10-CM

## 2024-11-15 DIAGNOSIS — E66.812 CLASS 2 SEVERE OBESITY DUE TO EXCESS CALORIES WITH SERIOUS COMORBIDITY AND BODY MASS INDEX (BMI) OF 38.0 TO 38.9 IN ADULT (HCC): ICD-10-CM

## 2024-11-15 RX ORDER — BUPROPION HYDROCHLORIDE 150 MG/1
TABLET ORAL
Qty: 30 TABLET | Refills: 1 | Status: SHIPPED | OUTPATIENT
Start: 2024-11-15

## 2024-11-19 DIAGNOSIS — J45.20 MILD INTERMITTENT ASTHMA WITHOUT COMPLICATION: ICD-10-CM

## 2024-11-19 RX ORDER — CEPHALEXIN 250 MG/1
CAPSULE ORAL 2 TIMES DAILY
Qty: 60 BLISTER | Refills: 5 | Status: SHIPPED | OUTPATIENT
Start: 2024-11-19

## 2024-11-19 RX ORDER — AZELASTINE HYDROCHLORIDE 137 UG/1
SPRAY, METERED NASAL
Qty: 30 ML | Refills: 5 | Status: SHIPPED | OUTPATIENT
Start: 2024-11-19

## 2024-11-22 ENCOUNTER — OFFICE VISIT (OUTPATIENT)
Dept: URGENT CARE | Age: 34
End: 2024-11-22
Payer: COMMERCIAL

## 2024-11-22 VITALS
TEMPERATURE: 98.4 F | OXYGEN SATURATION: 98 % | SYSTOLIC BLOOD PRESSURE: 110 MMHG | DIASTOLIC BLOOD PRESSURE: 78 MMHG | HEART RATE: 87 BPM | RESPIRATION RATE: 18 BRPM

## 2024-11-22 DIAGNOSIS — J06.9 UPPER RESPIRATORY TRACT INFECTION, UNSPECIFIED TYPE: Primary | ICD-10-CM

## 2024-11-22 PROCEDURE — G0382 LEV 3 HOSP TYPE B ED VISIT: HCPCS

## 2024-11-22 RX ORDER — AZITHROMYCIN 250 MG/1
TABLET, FILM COATED ORAL
Qty: 6 TABLET | Refills: 0 | Status: SHIPPED | OUTPATIENT
Start: 2024-11-22 | End: 2024-11-26

## 2024-11-22 NOTE — PROGRESS NOTES
[facility]        NAME: Reba Mccarthy is a 34 y.o. female  : 1990    MRN: [id number]  DATE: 2024  TIME: 9:55 AM    Assessment and Plan   Upper respiratory tract infection, unspecified type [J06.9]  1. Upper respiratory tract infection, unspecified type  azithromycin (ZITHROMAX) 250 mg tablet      Please begin antibiotics as directed.   Continue albuterol inhaler as directed.   Ensure good hydration.   May use Cepacol lozenges, Chloraseptic throat spray, warm salt water gargles and hot tea with honey as needed for sore throat.  Follow up with primary care provider if symptoms do not resolve within 1-2 weeks.        Patient Instructions   Patient Education     Upper Respiratory Infection ED   General Information   You came to the Emergency Department (ED) for an upper respiratory infection or URI. A URI can affect your nose, throat, ears, and sinuses. A virus is the cause of almost all URIs and antibiotics will not help you feel better more quickly. The common cold is an example of a viral URI.  URIs are easy to spread from person to person, most often through coughing or sneezing. A URI will almost always get better in a week or two without any treatment.  What care is needed at home?   Call your regular doctor to let them know you were in the ED. Make a follow-up appointment if you were told to.  If you smoke, try to quit. Your doctor or nurse can help.  Drink lots of fluids like water, juice, or broth. This will help replace any fluids lost if you have a runny nose or fever. Warm tea or soup can help soothe a sore throat.  If the air in your home feels dry, use a cool mist humidifier. This can help a stuffy nose and make it easier to breathe.  You can also use saline nose drops or spray to relieve stuffiness.  If you decide to take over-the-counter cough or cold medicines, follow the directions on the label carefully. Be sure you do not take more than 1 medicine that contains  acetaminophen. Also, if you have a heart problem or high blood pressure, check with your doctor before you take any of these medicines.  Wash your hands often. Cough or sneeze into a tissue or your elbow instead of your hands. When around others, you might also want to wear a face mask. These steps can help keep others around you healthy.  When do I need to get emergency help?   Return to the ED if:   You have trouble breathing when talking or sitting still.  When do I need to call the doctor?   You have a fever of 100.4°F (38°C) or higher for several days, chills, a very bad sore throat, or ear or sinus pain.  You develop a new fever after several days of feeling the same or improving.  You develop chest pain when you cough.  You have a cough that lasts more than 10 days.  You cough up blood.  You have new or worsening symptoms.  Last Reviewed Date   2022-02-01  Consumer Information Use and Disclaimer   This generalized information is a limited summary of diagnosis, treatment, and/or medication information. It is not meant to be comprehensive and should be used as a tool to help the user understand and/or assess potential diagnostic and treatment options. It does NOT include all information about conditions, treatments, medications, side effects, or risks that may apply to a specific patient. It is not intended to be medical advice or a substitute for the medical advice, diagnosis, or treatment of a health care provider based on the health care provider's examination and assessment of a patient’s specific and unique circumstances. Patients must speak with a health care provider for complete information about their health, medical questions, and treatment options, including any risks or benefits regarding use of medications. This information does not endorse any treatments or medications as safe, effective, or approved for treating a specific patient. [employer] and its affiliates disclaim any warranty or  liability relating to this information or the use thereof. The use of this information is governed by the Terms of Use, available at https://www.woltersScrybeuwer.com/en/know/clinical-effectiveness-terms   Copyright   Copyright © 2024 [employer] and its affiliates and/or licensors. All rights reserved.       Follow up with PCP in 3-5 days.  Proceed to  ER if symptoms worsen.    Chief Complaint     Chief Complaint   Patient presents with    Cough    Cold Like Symptoms    Generalized Body Aches    Sore Throat     URI symptoms started 1 week ago. OTC medications not helping. Productive cough.          History of Present Illness       Cough  This is a new problem. The current episode started 1 to 4 weeks ago (x 7-10 days). The problem has been unchanged. The cough is Productive of sputum. Associated symptoms include a sore throat. Pertinent negatives include no chest pain, chills, ear congestion, ear pain, eye redness, fever, headaches, heartburn, hemoptysis, myalgias, nasal congestion, postnasal drip, rash, rhinorrhea, shortness of breath, sweats, weight loss or wheezing. Nothing aggravates the symptoms. She has tried a beta-agonist inhaler for the symptoms. The treatment provided mild relief. Her past medical history is significant for asthma. There is no history of bronchiectasis, bronchitis, COPD, emphysema, environmental allergies or pneumonia.   Generalized Body Aches  Associated symptoms include congestion, a sore throat and coughing. Pertinent negatives include no ear discharge, ear pain, eye discharge, eye itching, eye pain, eye redness, headaches, rhinorrhea, stridor, fatigue, fever, weight loss, chest pain, shortness of breath, wheezing, diarrhea, nausea, vomiting, neck pain or rash.   Sore Throat   Associated symptoms include congestion and coughing. Pertinent negatives include no diarrhea, ear discharge, ear pain, headaches, neck pain, shortness of breath, stridor or vomiting.       Review of Systems    Review of Systems   Constitutional:  Negative for chills, fatigue, fever and weight loss.   HENT:  Positive for congestion and sore throat. Negative for ear discharge, ear pain, nosebleeds, postnasal drip, rhinorrhea, sinus pressure, sinus pain and sneezing.    Eyes: Negative.  Negative for pain, discharge, redness and itching.   Respiratory:  Positive for cough. Negative for apnea, hemoptysis, choking, chest tightness, shortness of breath, wheezing and stridor.    Cardiovascular: Negative.  Negative for chest pain and palpitations.   Gastrointestinal: Negative.  Negative for diarrhea, heartburn, nausea and vomiting.   Endocrine: Negative.  Negative for polydipsia, polyphagia and polyuria.   Genitourinary: Negative.  Negative for decreased urine volume and flank pain.   Musculoskeletal: Negative.  Negative for arthralgias, back pain, myalgias, neck pain and neck stiffness.   Skin: Negative.  Negative for color change and rash.   Allergic/Immunologic: Negative.  Negative for environmental allergies.   Neurological: Negative.  Negative for dizziness, facial asymmetry, light-headedness, numbness and headaches.   Hematological: Negative.  Negative for adenopathy.   Psychiatric/Behavioral: Negative.           Current Medications       Current Outpatient Medications:     albuterol (PROVENTIL HFA,VENTOLIN HFA) 90 mcg/act inhaler, TAKE 2 PUFFS BY MOUTH EVERY 4 HOURS AS NEEDED FOR WHEEZE, Disp: 8.5 g, Rfl: 1    Azelastine HCl 137 MCG/SPRAY SOLN, USE 1 SPRAY INTO EACH NOSTRIL TWICE A DAY, Disp: 30 mL, Rfl: 5    azithromycin (ZITHROMAX) 250 mg tablet, Take 2 tablets today then 1 tablet daily x 4 days, Disp: 6 tablet, Rfl: 0    budesonide (RINOCORT AQUA) 32 MCG/ACT nasal spray, 1 spray into each nostril daily, Disp: , Rfl:     buPROPion (WELLBUTRIN XL) 150 mg 24 hr tablet, TAKE 1 TABLET TWICE DAILY IN THE MORNING AND EVENING, Disp: 30 tablet, Rfl: 1    Fluticasone-Salmeterol (Advair Diskus) 100-50 mcg/dose inhaler, INHALE 1  DOSE BY MOUTH TWICE DAILY. RINSE MOUTH AFTER USE, Disp: 60 blister, Rfl: 5    naltrexone (REVIA) 50 mg tablet, Take 1/2 tablet (25mg) twice daily in the morning and evening, Disp: 30 tablet, Rfl: 0    Polyethylene Glycol 3350 (MIRALAX PO), Take by mouth, Disp: , Rfl:     Current Allergies     Allergies as of 2024 - Reviewed 2024   Allergen Reaction Noted    Shellfish allergy - food allergy Hives, Itching, and Swelling 05/10/2022            The following portions of the patient's history were reviewed and updated as appropriate: allergies, current medications, past family history, past medical history, past social history, past surgical history and problem list.     Past Medical History:   Diagnosis Date    Allergic     Asthma     Chronic midline low back pain without sciatica 2023    Chronic pain of both shoulders 2023    Fibroadenoma of left breast     Flu-like symptoms 2023    Prediabetes 2023       Past Surgical History:   Procedure Laterality Date    BREAST BIOPSY Left     Done in  for a Fibroadenoma    CHOLECYSTECTOMY LAPAROSCOPIC N/A 2023    Procedure: CHOLECYSTECTOMY LAPAROSCOPIC W/ INTRAOP CHOLANGIOGRAM;  Surgeon: Baltazar Wilcox MD;  Location: [facility];  Service: General    CYST REMOVAL N/A     low back    [place]  DELIVERY ONLY N/A 2023    Procedure:  SECTION ();  Surgeon: Ashley Jones MD;  Location: [facility];  Service: Obstetrics    WISDOM TOOTH EXTRACTION         Family History   Problem Relation Age of Onset    Hypertension Mother     Arthritis Mother     Hypothyroidism Mother     Stroke Father     Hypertension Father     Hypothyroidism Sister     Hypothyroidism Sister     Arthritis Maternal Grandmother     Cancer Maternal Grandfather     Stroke Paternal Grandmother     No Known Problems Paternal Grandfather     Breast cancer Neg Hx     Ovarian cancer Neg Hx     Cervical cancer Neg Hx     Uterine cancer Neg Hx           Medications have been verified.        Objective   /78 (Patient Position: Sitting, Cuff Size: Adult)   Pulse 87   Temp 98.4 °F (36.9 °C)   Resp 18   SpO2 98%        Physical Exam     Physical Exam  Vitals and nursing note reviewed.   Constitutional:       General: She is not in acute distress.     Appearance: Normal appearance. She is not ill-appearing, toxic-appearing or diaphoretic.      Interventions: She is not intubated.  HENT:      Head: Normocephalic and atraumatic.      Right Ear: Hearing, tympanic membrane, ear canal and external ear normal. Tympanic membrane is not erythematous or bulging.      Left Ear: Hearing, tympanic membrane, ear canal and external ear normal. Tympanic membrane is not erythematous or bulging.      Nose: Nose normal. No congestion or rhinorrhea.      Mouth/Throat:      Mouth: Mucous membranes are moist.      Pharynx: Oropharynx is clear. Uvula midline. No pharyngeal swelling, oropharyngeal exudate, posterior oropharyngeal erythema, uvula swelling or postnasal drip.      Tonsils: No tonsillar exudate or tonsillar abscesses. 1+ on the right. 1+ on the left.   Eyes:      Extraocular Movements: Extraocular movements intact.      Conjunctiva/sclera: Conjunctivae normal.      Pupils: Pupils are equal, round, and reactive to light.   Neck:      Thyroid: No thyroid mass, thyromegaly or thyroid tenderness.   Cardiovascular:      Rate and Rhythm: Normal rate and regular rhythm.      Pulses: Normal pulses.      Heart sounds: Normal heart sounds, S1 normal and S2 normal. Heart sounds not distant. No murmur heard.     No friction rub. No gallop.   Pulmonary:      Effort: Pulmonary effort is normal. No tachypnea, bradypnea, accessory muscle usage, prolonged expiration, respiratory distress or retractions. She is not intubated.      Breath sounds: Normal breath sounds. No stridor, decreased air movement or transmitted upper airway sounds. No decreased breath sounds, wheezing,  rhonchi or rales.   Abdominal:      General: Bowel sounds are normal.      Palpations: Abdomen is soft.      Tenderness: There is no abdominal tenderness. There is no guarding or rebound.   Musculoskeletal:         General: Normal range of motion.      Cervical back: Full passive range of motion without pain, normal range of motion and neck supple. No tenderness. No spinous process tenderness or muscular tenderness. Normal range of motion.   Lymphadenopathy:      Cervical: No cervical adenopathy.      Right cervical: No superficial cervical adenopathy.     Left cervical: No superficial cervical adenopathy.   Skin:     General: Skin is warm and dry.      Capillary Refill: Capillary refill takes less than 2 seconds.   Neurological:      General: No focal deficit present.      Mental Status: She is alert and oriented to person, place, and time.      Cranial Nerves: No cranial nerve deficit.   Psychiatric:         Mood and Affect: Mood normal.         Behavior: Behavior normal.

## 2024-11-22 NOTE — LETTER
November 22, 2024     Patient: Reba Mccarthy   YOB: 1990   Date of Visit: 11/22/2024       To Whom it May Concern:    Reba Mccarthy was seen in my clinic on 11/22/2024. She may return on 11/25/2024.     If you have any questions or concerns, please don't hesitate to call.         Sincerely,          [facility]        CC: No Recipients

## 2024-11-22 NOTE — PATIENT INSTRUCTIONS
Please begin antibiotics as directed.   Continue albuterol inhaler as directed.   Ensure good hydration.   May use Cepacol lozenges, Chloraseptic throat spray, warm salt water gargles and hot tea with honey as needed for sore throat.  Follow up with primary care provider if symptoms do not resolve within 1-2 weeks.

## 2024-12-14 DIAGNOSIS — J45.20 MILD INTERMITTENT ASTHMA WITHOUT COMPLICATION: ICD-10-CM

## 2024-12-16 RX ORDER — AZELASTINE HYDROCHLORIDE 137 UG/1
SPRAY, METERED NASAL
Qty: 30 ML | Refills: 2 | Status: SHIPPED | OUTPATIENT
Start: 2024-12-16

## 2024-12-18 ENCOUNTER — OFFICE VISIT (OUTPATIENT)
Dept: BARIATRICS | Facility: CLINIC | Age: 34
End: 2024-12-18
Payer: COMMERCIAL

## 2024-12-18 ENCOUNTER — TELEPHONE (OUTPATIENT)
Age: 34
End: 2024-12-18

## 2024-12-18 VITALS
SYSTOLIC BLOOD PRESSURE: 120 MMHG | BODY MASS INDEX: 33.31 KG/M2 | DIASTOLIC BLOOD PRESSURE: 72 MMHG | HEIGHT: 63 IN | OXYGEN SATURATION: 99 % | HEART RATE: 82 BPM | WEIGHT: 188 LBS

## 2024-12-18 DIAGNOSIS — E66.812 CLASS 2 SEVERE OBESITY DUE TO EXCESS CALORIES WITH SERIOUS COMORBIDITY AND BODY MASS INDEX (BMI) OF 38.0 TO 38.9 IN ADULT (HCC): ICD-10-CM

## 2024-12-18 DIAGNOSIS — Z86.32 HISTORY OF INSULIN CONTROLLED GESTATIONAL DIABETES MELLITUS: Primary | ICD-10-CM

## 2024-12-18 DIAGNOSIS — K59.00 CONSTIPATION, UNSPECIFIED CONSTIPATION TYPE: ICD-10-CM

## 2024-12-18 DIAGNOSIS — E66.01 CLASS 2 SEVERE OBESITY DUE TO EXCESS CALORIES WITH SERIOUS COMORBIDITY AND BODY MASS INDEX (BMI) OF 38.0 TO 38.9 IN ADULT (HCC): ICD-10-CM

## 2024-12-18 PROCEDURE — 99214 OFFICE O/P EST MOD 30 MIN: CPT | Performed by: NURSE PRACTITIONER

## 2024-12-18 RX ORDER — NALTREXONE HYDROCHLORIDE 50 MG/1
TABLET, FILM COATED ORAL
Qty: 30 TABLET | Refills: 2 | Status: SHIPPED | OUTPATIENT
Start: 2024-12-18

## 2024-12-18 NOTE — PROGRESS NOTES
Assessment/Plan:     Class 2 severe obesity due to excess calories with serious comorbidity and body mass index (BMI) of 38.0 to 38.9 in adult (HCC)  - Patient is pursuing Conservative Program and follow up visits with medical weight management provider  - Initial weight loss goal of 5-10% weight loss for improved health. Weight loss can improve patient's co-morbid conditions and/or prevent weight-related complications.  - Explained the importance of continuing lifestyle changes in addition to any anti-obesity medications.  - Weight is not at goal and patient tried for more than 6 months to lose weight using various programs and tools, they were unable to achieve a meaningful weight loss above 5%   - Labs reviewed from 8/2024    General Recommendations:  Nutrition:  Eat breakfast daily.  Do not skip meals.      Food log (ie.) [email or web address], sparkpeople.com, loseit.com, calorieking.com, etc.     Practice mindful eating.  Be sure to set aside time to eat, eat slowly, and savor your food.     Hydration:    At least 64oz of water daily.  No sugar sweetened beverages.  No juice (eat the fruit instead).     Exercise:  Studies have shown that the ideal exercise goal is somewhere between 150 to 300 minutes of moderate intensity exercise a week.  Start with exercising 10 minutes every other day and gradually increase physical activity with a goal of at least 150 minutes of moderate intensity exercise a week, divided over at least 3 days a week.  An example of this would be exercising 30 minutes a day, 5 days a week.  Resistance training can increase muscle mass and increase our resting metabolic rate.   FULL BODY resistance training is recommended 2-3 times a week.  Do not do this on consecutive days to allow for muscle recovery.     Aim for a bare minimum 5000 steps, even on days you do not exercise.     Monitoring:   Weigh yourself daily.  If this causes undue stress, then just weigh yourself once a week.  Weigh  yourself the same time of the day with the same amount of clothing on.  Preferably this should be done after waking up, before you eat, and with no clothing or minimal clothing on.     Specific Goals:  Calorie goal:  0945-0954 italo/day  Patient lifestyle habits were reviewed and she was congratulated on her weight loss.  Nutrition was discussed and she was encouraged to get back to the dietitian plan to make sure she is getting an adequate protein and that her portions are well-balanced.  Exercise was discussed and she was encouraged to start to incorporate 20 to 30 minutes of activity 2-3 times a week.  Medications were discussed and patient would like to see if Wegovy would be covered under her current insurance plan.  GLP-1 medications will likely help to control of her metabolic disease.  If not covered patient will continue with bupropion and naltrexone.  If she does start Wegovy may consider stopping naltrexone and weaning off of bupropion.    Wegovy Instructions:    - Begin Wegovy 0.25 mg subcutaneously once a week. Dose changes may occur after 4 doses if medication is tolerated. You will be assessed prior to each dose change to make sure you are tolerating the medication well.  - Please message me when you have 2 pens left from the prescription so there are no lapses in treatment.  - If you have been off the medication for more than 14 days please contact the office as you will need to restart the titration at the starting dose again to avoid significant side effects or adverse events.  - Visit [email or web address] for further information/injection instructions.   -Please eat small frequent meals to help reduce nausea. Lemon water and saltine crackers may help with this.   - Side effects of Wegovy discussed: nausea, vomiting, diarrhea, and constipation. If you experience fever, nausea/vomiting, and pain radiating to your back this may be a sign of pancreatitis. Please go to the emergency room if this occurs.  - Patient  understands the side effects of the medication and proper administration. Patient agrees with the treatment plan and all questions were answered.  - Supply concerns were discussed with patient and patient voiced understanding that they will need to call with adequate time for refills to avoid any lapses in treatment.  Patient may also have to call around to different pharmacies to see if any pharmacy has the appropriate dose in stock.  -Pen demonstrated in the office today    History of insulin controlled gestational diabetes mellitus  At risk for diabetes in the future  Will likely benefit from GLP-1 medication to control blood sugar and insulin balance    Constipation  Encourage patient to continue with regular bowel regimen and monitor bowel habits we will starting on GLP-1 medication         Reba was seen today for follow-up.    Diagnoses and all orders for this visit:    History of insulin controlled gestational diabetes mellitus    Class 2 severe obesity due to excess calories with serious comorbidity and body mass index (BMI) of 38.0 to 38.9 in adult (HCC)  -     naltrexone (REVIA) 50 mg tablet; Take 1/2 tablet (25mg) twice daily in the morning and evening  -     Semaglutide-Weight Management (WEGOVY) 0.25 MG/0.5ML; Inject 0.5 mL (0.25 mg total) under the skin once a week    Constipation, unspecified constipation type      Patient denies personal history of pancreatitis. Patient also denies personal and family history of medullary thyroid cancer and multiple endocrine neoplasia type 2 (MEN 2 tumor).    had a vasectomy for contraception.    Total time spent reviewing chart, interviewing patient, examining patient, discussing plan, answering all questions, and documentin minutes with >50% face-to-face time with the patient.    Follow up in approximately 3 months with Non-Surgical Physician/Advanced Practitioner.    Subjective:   Chief Complaint   Patient presents with    Follow-up     Pt is here  for [facility] f/u       Patient ID: Reba Mccarthy  is a 34 y.o. female with excess weight/obesity here to pursue weight management.  Patient is pursuing Conservative Program.   Most recent notes and records were reviewed.    HPI    Wt Readings from Last 20 Encounters:   12/18/24 85.3 kg (188 lb)   08/07/24 92.1 kg (203 lb)   07/23/24 94.4 kg (208 lb 3.2 oz)   06/20/24 96.9 kg (213 lb 11.2 oz)   05/22/24 97 kg (213 lb 12.8 oz)   05/01/24 98 kg (216 lb)   03/25/24 98.8 kg (217 lb 12.8 oz)   03/20/24 97.5 kg (215 lb)   12/27/23 100 kg (221 lb)   12/12/23 100 kg (220 lb 6.4 oz)   11/22/23 100 kg (221 lb 3.2 oz)   09/06/23 100 kg (221 lb)   05/24/23 97.5 kg (215 lb)   03/13/23 94.1 kg (207 lb 6.4 oz)   03/06/23 93.1 kg (205 lb 3.2 oz)   02/13/23 95.4 kg (210 lb 6.4 oz)   01/24/23 97.2 kg (214 lb 3.2 oz)   01/19/23 97.2 kg (214 lb 3.2 oz)   01/17/23 96.8 kg (213 lb 6.4 oz)   01/16/23 98.4 kg (217 lb)       Patient presents today to medical weight management office for follow up.  Patient had been on bupropion and naltrexone and felt that it was helping with her appetite.  She has been off the naltrexone for a month due to refill concerns and is maintained on bupropion.  She feels the bupropion has helped with her focusing and depression as well as her appetite, but felt her appetite was more controlled with both medications.  Patient has not been as mindful of her nutrition since starting her new job due to her schedule changing.  Patient did receive a letter from her insurance that Wegovy may be covered and would like to further discuss this option.  Patient does have a history of PCOS and gestational diabetes.  Her A1c has improved with her weight loss.        Weight loss medication and dose: Bupropion/naltrexone  Started weight and date: 213.8 lbs in 5/2024  Current weight: 188 lbs (208.2 lbs at last OV)  Difference: -25.8 lbs (-20.2 since last OV)  Goal weight: 180 lbs     Starting BMI: 37.87  Current BMI: 33.3      Waist Measurements:  2024: 40.5 in  2024: 38.5 in  2024: 38     Nutrition Prescription  Calories: 1,300-1,400 kcal  Protein: 63-78 g  Fluid: 61 ounces        Diet recall:  B: skips - coffee with hot cocoa powder  L: sandwich - ham and cheese with vegetables OR low italo wrap with ham and cheese  S: no  D: protein (chicken or pork) with rice or pasta with vegetables  S: oatmeal mug cake      Hydration: coffee with hot cocoa powder, diet soda - 1-2 cans, water - 64-80 oz  Alcohol: once every couple of week  Smoking: no  Exercise: occasionally walking  Occupation: stay at home mom  Sleep: varies  STOP ban/8      The following portions of the patient's history were reviewed and updated as appropriate: allergies, current medications, past family history, past medical history, past social history, past surgical history, and problem list.    Family History   Problem Relation Age of Onset    Hypertension Mother     Arthritis Mother     Hypothyroidism Mother     Stroke Father     Hypertension Father     Hypothyroidism Sister     Hypothyroidism Sister     Arthritis Maternal Grandmother     Cancer Maternal Grandfather     Stroke Paternal Grandmother     No Known Problems Paternal Grandfather     Breast cancer Neg Hx     Ovarian cancer Neg Hx     Cervical cancer Neg Hx     Uterine cancer Neg Hx         Review of Systems   Constitutional:  Negative for fatigue.   HENT:  Negative for sore throat.    Respiratory:  Negative for cough and shortness of breath.    Cardiovascular:  Negative for chest pain, palpitations and leg swelling.   Gastrointestinal:  Negative for abdominal pain, constipation, diarrhea and nausea.   Genitourinary:  Negative for dysuria.   Musculoskeletal:  Negative for arthralgias and back pain.   Skin:  Negative for rash.   Neurological:  Negative for headaches.   Psychiatric/Behavioral:  Negative for dysphoric mood. The patient is not nervous/anxious.        Objective:  /72 (BP Location: Right  "arm, Patient Position: Sitting, Cuff Size: Large)   Pulse 82   Ht 5' 3\" (1.6 m)   Wt 85.3 kg (188 lb)   LMP 11/27/2024   SpO2 99%   BMI 33.30 kg/m²     Physical Exam  Vitals and nursing note reviewed.   Constitutional:       Appearance: Normal appearance. She is obese.   HENT:      Head: Normocephalic.   Pulmonary:      Effort: Pulmonary effort is normal.   Neurological:      General: No focal deficit present.      Mental Status: She is alert and oriented to person, place, and time.   Psychiatric:         Mood and Affect: Mood normal.         Behavior: Behavior normal.         Thought Content: Thought content normal.         Judgment: Judgment normal.            Labs   Most recent labs reviewed   Lab Results   Component Value Date    SODIUM 136 08/06/2024    K 3.7 08/06/2024     08/06/2024    CO2 24 08/06/2024    AGAP 9 08/06/2024    BUN 8 08/06/2024    CREATININE 0.88 08/06/2024    GLUC 103 02/23/2023    GLUF 91 08/06/2024    CALCIUM 9.1 08/06/2024    AST 11 (L) 03/22/2023    ALT 10 03/22/2023    ALKPHOS 78 03/22/2023    TP 6.8 03/22/2023    TBILI 0.69 03/22/2023    EGFR 85 08/06/2024     Lab Results   Component Value Date    HGBA1C 5.5 08/06/2024     Lab Results   Component Value Date    YVZ6FXRVPHCD 2.743 11/22/2023    TSH 2.14 11/24/2021     Lab Results   Component Value Date    CHOLESTEROL 147 11/22/2023     Lab Results   Component Value Date    HDL 39 (L) 11/22/2023     Lab Results   Component Value Date    TRIG 69 11/22/2023     Lab Results   Component Value Date    LDLCALC 94 11/22/2023     "

## 2024-12-18 NOTE — ASSESSMENT & PLAN NOTE
Encourage patient to continue with regular bowel regimen and monitor bowel habits we will starting on GLP-1 medication

## 2024-12-18 NOTE — PROGRESS NOTES
Assessment/Plan:     No problem-specific Assessment & Plan notes found for this encounter.           There are no diagnoses linked to this encounter.     Total time spent reviewing chart, interviewing patient, examining patient, discussing plan, answering all questions, and documenting: *** minutes with >50% face-to-face time with the patient.     Follow up in approximately {FOLLOW UP:63867} with {WM Follow Up:19333}.     Subjective:        Chief Complaint   Patient presents with    Follow-up       Pt is here for M f/u         Patient ID: Reba Mccarthy  is a 34 y.o. female with excess weight/obesity here to pursue weight management.  Patient is pursuing {PT INTEREST (Optional):21442}.   Most recent notes and records were reviewed.     HPI         Wt Readings from Last 20 Encounters:   12/18/24 85.3 kg (188 lb)   08/07/24 92.1 kg (203 lb)   07/23/24 94.4 kg (208 lb 3.2 oz)   06/20/24 96.9 kg (213 lb 11.2 oz)   05/22/24 97 kg (213 lb 12.8 oz)   05/01/24 98 kg (216 lb)   03/25/24 98.8 kg (217 lb 12.8 oz)   03/20/24 97.5 kg (215 lb)   12/27/23 100 kg (221 lb)   12/12/23 100 kg (220 lb 6.4 oz)   11/22/23 100 kg (221 lb 3.2 oz)   09/06/23 100 kg (221 lb)   05/24/23 97.5 kg (215 lb)   03/13/23 94.1 kg (207 lb 6.4 oz)   03/06/23 93.1 kg (205 lb 3.2 oz)   02/13/23 95.4 kg (210 lb 6.4 oz)   01/24/23 97.2 kg (214 lb 3.2 oz)   01/19/23 97.2 kg (214 lb 3.2 oz)   01/17/23 96.8 kg (213 lb 6.4 oz)   01/16/23 98.4 kg (217 lb)         Patient presents today to medical weight management office for follow up.     Weight loss medication and dose: ***  Started weight and date: ***  Current weight: ***  Difference: ***     Starting BMI: ***  Current BMI: ***     Waist Measurements:  Patient was started on bupropion/naltrexone and feels they have been helping her with her cravings. She has been able to lose some weight and is getting in better control of her eating habits. She does experience some dizziness at times but it is  "mild and only occasionally. She otherwise doesn't have side effects to the medication.        Weight loss medication and dose: Bupropion/naltrexone  Started weight and date: 213.8 lbs in 2024  Current weight: 188 lbs (208.2 lbs  Difference: -25.8 lbs (-20.2  Goal weight: 180 lbs     Starting BMI: 37.87  Current BMI: 33.3     Waist Measurements:  2024: 40.5 in  2024: 38.5 in     Nutrition Prescription  Calories: 1,300-1,400 kcal  Protein: 63-78 g  Fluid: 61 ounces        Diet recall:  B: skips - coffee with hot cocoa powder  L: at work - rice and veggies OR salads  S: popcorn OR candy  D: 1/2 salad OR rice chicken and vegetables   S: oatmeal mug cake   Take out: once a week     Hydration: coffee with hot cocoa powder, diet soda - 1-2 cans, water - 64-80 oz  Alcohol: once every couple of week  Smoking: no  Exercise: no  Occupation: stay at home mom  Sleep: varies  STOP ban/8     The following portions of the patient's history were reviewed and updated as appropriate: allergies, current medications, past family history, past medical history, past social history, past surgical history, and problem list.     Family History         Family History   Problem Relation Age of Onset    Hypertension Mother      Arthritis Mother      Hypothyroidism Mother      Stroke Father      Hypertension Father      Hypothyroidism Sister      Hypothyroidism Sister      Arthritis Maternal Grandmother      Cancer Maternal Grandfather      Stroke Paternal Grandmother      No Known Problems Paternal Grandfather      Breast cancer Neg Hx      Ovarian cancer Neg Hx      Cervical cancer Neg Hx      Uterine cancer Neg Hx              Review of Systems     Objective:  /72 (BP Location: Right arm, Patient Position: Sitting, Cuff Size: Large)   Pulse 82   Ht 5' 3\" (1.6 m)   Wt 85.3 kg (188 lb)   LMP 2024   SpO2 99%   BMI 33.30 kg/m²      Physical Exam         Labs   Most recent labs reviewed         Lab Results   Component " Value Date     SODIUM 136 08/06/2024     K 3.7 08/06/2024      08/06/2024     CO2 24 08/06/2024     AGAP 9 08/06/2024     BUN 8 08/06/2024     CREATININE 0.88 08/06/2024     GLUC 103 02/23/2023     GLUF 91 08/06/2024     CALCIUM 9.1 08/06/2024     AST 11 (L) 03/22/2023     ALT 10 03/22/2023     ALKPHOS 78 03/22/2023     TP 6.8 03/22/2023     TBILI 0.69 03/22/2023     EGFR 85 08/06/2024            Lab Results   Component Value Date     HGBA1C 5.5 08/06/2024            Lab Results   Component Value Date     [id number] 2.743 11/22/2023     TSH 2.14 11/24/2021            Lab Results   Component Value Date     CHOLESTEROL 147 11/22/2023            Lab Results   Component Value Date     HDL 39 (L) 11/22/2023            Lab Results   Component Value Date     TRIG 69 11/22/2023            Lab Results   Component Value Date     LDLCALC 94 11/22/2023       Discharged

## 2024-12-18 NOTE — ASSESSMENT & PLAN NOTE
At risk for diabetes in the future  Will likely benefit from GLP-1 medication to control blood sugar and insulin balance

## 2024-12-18 NOTE — ASSESSMENT & PLAN NOTE
- Patient is pursuing Conservative Program and follow up visits with medical weight management provider  - Initial weight loss goal of 5-10% weight loss for improved health. Weight loss can improve patient's co-morbid conditions and/or prevent weight-related complications.  - Explained the importance of continuing lifestyle changes in addition to any anti-obesity medications.  - Weight is not at goal and patient tried for more than 6 months to lose weight using various programs and tools, they were unable to achieve a meaningful weight loss above 5%   - Labs reviewed from 8/2024    General Recommendations:  Nutrition:  Eat breakfast daily.  Do not skip meals.      Food log (ie.) [email or web address], sparkpeople.com, loseit.com, D.[email or web address], etc.     Practice mindful eating.  Be sure to set aside time to eat, eat slowly, and savor your food.     Hydration:    At least 64oz of water daily.  No sugar sweetened beverages.  No juice (eat the fruit instead).     Exercise:  Studies have shown that the ideal exercise goal is somewhere between 150 to 300 minutes of moderate intensity exercise a week.  Start with exercising 10 minutes every other day and gradually increase physical activity with a goal of at least 150 minutes of moderate intensity exercise a week, divided over at least 3 days a week.  An example of this would be exercising 30 minutes a day, 5 days a week.  Resistance training can increase muscle mass and increase our resting metabolic rate.   FULL BODY resistance training is recommended 2-3 times a week.  Do not do this on consecutive days to allow for muscle recovery.     Aim for a bare minimum 5000 steps, even on days you do not exercise.     Monitoring:   Weigh yourself daily.  If this causes undue stress, then just weigh yourself once a week.  Weigh yourself the same time of the day with the same amount of clothing on.  Preferably this should be done after waking up, before you eat, and with no  clothing or minimal clothing on.     Specific Goals:  Calorie goal:  7413-4002 italo/day  Patient lifestyle habits were reviewed and she was congratulated on her weight loss.  Nutrition was discussed and she was encouraged to get back to the dietitian plan to make sure she is getting an adequate protein and that her portions are well-balanced.  Exercise was discussed and she was encouraged to start to incorporate 20 to 30 minutes of activity 2-3 times a week.  Medications were discussed and patient would like to see if Wegovy would be covered under her current insurance plan.  GLP-1 medications will likely help to control of her metabolic disease.  If not covered patient will continue with bupropion and naltrexone.  If she does start Wegovy may consider stopping naltrexone and weaning off of bupropion.    Wegovy Instructions:    - Begin Wegovy 0.25 mg subcutaneously once a week. Dose changes may occur after 4 doses if medication is tolerated. You will be assessed prior to each dose change to make sure you are tolerating the medication well.  - Please message me when you have 2 pens left from the prescription so there are no lapses in treatment.  - If you have been off the medication for more than 14 days please contact the office as you will need to restart the titration at the starting dose again to avoid significant side effects or adverse events.  - Visit [email or web address] for further information/injection instructions.   -Please eat small frequent meals to help reduce nausea. Lemon water and saltine crackers may help with this.   - Side effects of Wegovy discussed: nausea, vomiting, diarrhea, and constipation. If you experience fever, nausea/vomiting, and pain radiating to your back this may be a sign of pancreatitis. Please go to the emergency room if this occurs.  - Patient understands the side effects of the medication and proper administration. Patient agrees with the treatment plan and all questions were  answered.  - Supply concerns were discussed with patient and patient voiced understanding that they will need to call with adequate time for refills to avoid any lapses in treatment.  Patient may also have to call around to different pharmacies to see if any pharmacy has the appropriate dose in stock.  -Pen demonstrated in the office today

## 2024-12-19 ENCOUNTER — TELEPHONE (OUTPATIENT)
Dept: BARIATRICS | Facility: CLINIC | Age: 34
End: 2024-12-19

## 2024-12-19 ENCOUNTER — TELEPHONE (OUTPATIENT)
Dept: OTHER | Facility: OTHER | Age: 34
End: 2024-12-19

## 2024-12-20 NOTE — TELEPHONE ENCOUNTER
Received inbound call from [facility] -   Prior Auth request is approved for Wegovy

## 2024-12-28 DIAGNOSIS — J45.20 MILD INTERMITTENT ASTHMA WITHOUT COMPLICATION: ICD-10-CM

## 2024-12-28 RX ORDER — ALBUTEROL SULFATE 90 UG/1
2 INHALANT RESPIRATORY (INHALATION) EVERY 4 HOURS PRN
Qty: 8.5 G | Refills: 2 | Status: SHIPPED | OUTPATIENT
Start: 2024-12-28

## 2024-12-30 DIAGNOSIS — E66.01 CLASS 2 SEVERE OBESITY DUE TO EXCESS CALORIES WITH SERIOUS COMORBIDITY AND BODY MASS INDEX (BMI) OF 38.0 TO 38.9 IN ADULT (HCC): ICD-10-CM

## 2024-12-30 DIAGNOSIS — E66.812 CLASS 2 SEVERE OBESITY DUE TO EXCESS CALORIES WITH SERIOUS COMORBIDITY AND BODY MASS INDEX (BMI) OF 38.0 TO 38.9 IN ADULT (HCC): ICD-10-CM

## 2024-12-31 RX ORDER — BUPROPION HYDROCHLORIDE 150 MG/1
TABLET ORAL
Qty: 30 TABLET | Refills: 1 | Status: SHIPPED | OUTPATIENT
Start: 2024-12-31

## 2025-01-10 DIAGNOSIS — J45.20 MILD INTERMITTENT ASTHMA WITHOUT COMPLICATION: ICD-10-CM

## 2025-01-13 DIAGNOSIS — E66.812 CLASS 2 SEVERE OBESITY DUE TO EXCESS CALORIES WITH SERIOUS COMORBIDITY AND BODY MASS INDEX (BMI) OF 38.0 TO 38.9 IN ADULT (HCC): Primary | ICD-10-CM

## 2025-01-13 DIAGNOSIS — E66.01 CLASS 2 SEVERE OBESITY DUE TO EXCESS CALORIES WITH SERIOUS COMORBIDITY AND BODY MASS INDEX (BMI) OF 38.0 TO 38.9 IN ADULT (HCC): Primary | ICD-10-CM

## 2025-01-13 RX ORDER — AZELASTINE HYDROCHLORIDE 137 UG/1
SPRAY, METERED NASAL
Qty: 90 ML | Refills: 1 | Status: SHIPPED | OUTPATIENT
Start: 2025-01-13

## 2025-02-01 DIAGNOSIS — E66.01 CLASS 2 SEVERE OBESITY DUE TO EXCESS CALORIES WITH SERIOUS COMORBIDITY AND BODY MASS INDEX (BMI) OF 38.0 TO 38.9 IN ADULT (HCC): ICD-10-CM

## 2025-02-01 DIAGNOSIS — E66.812 CLASS 2 SEVERE OBESITY DUE TO EXCESS CALORIES WITH SERIOUS COMORBIDITY AND BODY MASS INDEX (BMI) OF 38.0 TO 38.9 IN ADULT (HCC): ICD-10-CM

## 2025-02-05 DIAGNOSIS — E66.812 CLASS 2 SEVERE OBESITY DUE TO EXCESS CALORIES WITH SERIOUS COMORBIDITY AND BODY MASS INDEX (BMI) OF 38.0 TO 38.9 IN ADULT (HCC): Primary | ICD-10-CM

## 2025-02-05 DIAGNOSIS — E66.01 CLASS 2 SEVERE OBESITY DUE TO EXCESS CALORIES WITH SERIOUS COMORBIDITY AND BODY MASS INDEX (BMI) OF 38.0 TO 38.9 IN ADULT (HCC): Primary | ICD-10-CM

## 2025-02-05 RX ORDER — SEMAGLUTIDE 0.5 MG/.5ML
INJECTION, SOLUTION SUBCUTANEOUS
OUTPATIENT
Start: 2025-02-05

## 2025-02-14 ENCOUNTER — APPOINTMENT (OUTPATIENT)
Dept: LAB | Facility: CLINIC | Age: 35
End: 2025-02-14
Payer: COMMERCIAL

## 2025-02-14 DIAGNOSIS — D75.839 THROMBOCYTHEMIA: ICD-10-CM

## 2025-02-14 LAB
ANION GAP SERPL CALCULATED.3IONS-SCNC: 9 MMOL/L (ref 4–13)
BASOPHILS # BLD AUTO: 0.05 THOUSANDS/ÂΜL (ref 0–0.1)
BASOPHILS NFR BLD AUTO: 1 % (ref 0–1)
BUN SERPL-MCNC: 7 MG/DL (ref 5–25)
CALCIUM SERPL-MCNC: 9.8 MG/DL (ref 8.4–10.2)
CHLORIDE SERPL-SCNC: 102 MMOL/L (ref 96–108)
CO2 SERPL-SCNC: 25 MMOL/L (ref 21–32)
CREAT SERPL-MCNC: 0.88 MG/DL (ref 0.6–1.3)
EOSINOPHIL # BLD AUTO: 0.14 THOUSAND/ÂΜL (ref 0–0.61)
EOSINOPHIL NFR BLD AUTO: 2 % (ref 0–6)
ERYTHROCYTE [DISTWIDTH] IN BLOOD BY AUTOMATED COUNT: 13.5 % (ref 11.6–15.1)
GFR SERPL CREATININE-BSD FRML MDRD: 84 ML/MIN/1.73SQ M
GLUCOSE P FAST SERPL-MCNC: 90 MG/DL (ref 65–99)
HCT VFR BLD AUTO: 41.2 % (ref 34.8–46.1)
HGB BLD-MCNC: 13.2 G/DL (ref 11.5–15.4)
IMM GRANULOCYTES # BLD AUTO: 0.01 THOUSAND/UL (ref 0–0.2)
IMM GRANULOCYTES NFR BLD AUTO: 0 % (ref 0–2)
LYMPHOCYTES # BLD AUTO: 2.06 THOUSANDS/ÂΜL (ref 0.6–4.47)
LYMPHOCYTES NFR BLD AUTO: 34 % (ref 14–44)
MCH RBC QN AUTO: 28.4 PG (ref 26.8–34.3)
MCHC RBC AUTO-ENTMCNC: 32 G/DL (ref 31.4–37.4)
MCV RBC AUTO: 89 FL (ref 82–98)
MONOCYTES # BLD AUTO: 0.4 THOUSAND/ÂΜL (ref 0.17–1.22)
MONOCYTES NFR BLD AUTO: 7 % (ref 4–12)
NEUTROPHILS # BLD AUTO: 3.37 THOUSANDS/ÂΜL (ref 1.85–7.62)
NEUTS SEG NFR BLD AUTO: 56 % (ref 43–75)
NRBC BLD AUTO-RTO: 0 /100 WBCS
PLATELET # BLD AUTO: 455 THOUSANDS/UL (ref 149–390)
PMV BLD AUTO: 8.7 FL (ref 8.9–12.7)
POTASSIUM SERPL-SCNC: 3.7 MMOL/L (ref 3.5–5.3)
RBC # BLD AUTO: 4.65 MILLION/UL (ref 3.81–5.12)
SODIUM SERPL-SCNC: 136 MMOL/L (ref 135–147)
WBC # BLD AUTO: 6.03 THOUSAND/UL (ref 4.31–10.16)

## 2025-02-14 PROCEDURE — 36415 COLL VENOUS BLD VENIPUNCTURE: CPT

## 2025-02-14 PROCEDURE — 85025 COMPLETE CBC W/AUTO DIFF WBC: CPT

## 2025-02-14 PROCEDURE — 80048 BASIC METABOLIC PNL TOTAL CA: CPT

## 2025-02-18 ENCOUNTER — OFFICE VISIT (OUTPATIENT)
Age: 35
End: 2025-02-18
Payer: COMMERCIAL

## 2025-02-18 VITALS
DIASTOLIC BLOOD PRESSURE: 66 MMHG | OXYGEN SATURATION: 96 % | TEMPERATURE: 96.1 F | WEIGHT: 177 LBS | HEART RATE: 94 BPM | SYSTOLIC BLOOD PRESSURE: 112 MMHG | RESPIRATION RATE: 17 BRPM | HEIGHT: 63 IN | BODY MASS INDEX: 31.36 KG/M2

## 2025-02-18 DIAGNOSIS — E66.812 CLASS 2 SEVERE OBESITY DUE TO EXCESS CALORIES WITH SERIOUS COMORBIDITY AND BODY MASS INDEX (BMI) OF 38.0 TO 38.9 IN ADULT (HCC): ICD-10-CM

## 2025-02-18 DIAGNOSIS — E66.01 CLASS 2 SEVERE OBESITY DUE TO EXCESS CALORIES WITH SERIOUS COMORBIDITY AND BODY MASS INDEX (BMI) OF 38.0 TO 38.9 IN ADULT (HCC): ICD-10-CM

## 2025-02-18 DIAGNOSIS — F41.9 ANXIETY: ICD-10-CM

## 2025-02-18 DIAGNOSIS — D75.839 THROMBOCYTHEMIA: Primary | ICD-10-CM

## 2025-02-18 DIAGNOSIS — J45.20 MILD INTERMITTENT ASTHMA WITHOUT COMPLICATION: ICD-10-CM

## 2025-02-18 PROCEDURE — 99214 OFFICE O/P EST MOD 30 MIN: CPT | Performed by: INTERNAL MEDICINE

## 2025-02-18 NOTE — ASSESSMENT & PLAN NOTE
-finds she is able to concentrate better on wellbutrin XL, though agree to decrease it to 150mg once daily  -has social anxiety, previously followed by therapist in [place]  Orders:  •  CBC and differential; Future  •  Comprehensive metabolic panel; Future  •  TSH, 3rd generation with Free T4 reflex; Future

## 2025-02-18 NOTE — PROGRESS NOTES
Name: Reba Mccarthy      : 1990      MRN: [id number]  Encounter Provider: Soheila Hyman DO  Encounter Date: 2025   Encounter department: [facility] INTERNAL MEDICINE  :  Assessment & Plan  Thrombocythemia  -mild, stable  -check iron panel  Orders:  •  Iron Panel (Includes Ferritin, Iron Sat%, Iron, and TIBC); Future  •  CBC and differential; Future  •  Comprehensive metabolic panel; Future  •  TSH, 3rd generation with Free T4 reflex; Future    Anxiety  -finds she is able to concentrate better on wellbutrin XL, though agree to decrease it to 150mg once daily  -has social anxiety, previously followed by therapist in [place]  Orders:  •  CBC and differential; Future  •  Comprehensive metabolic panel; Future  •  TSH, 3rd generation with Free T4 reflex; Future    Mild intermittent asthma without complication  -typical triggers are allergies and dust  -continue albuterol and advair        Class 2 severe obesity due to excess calories with serious comorbidity and body mass index (BMI) of 38.0 to 38.9 in adult (HCC)    -initially started on Contrave due to insurance but wegovy then approved  -doing well on wegovy currently  -follows with Weight Loss Management              History of Present Illness   HPI    Started Wegovy two months ago.  Prior to she was on contrave.  She has lost 15pounds on wegovy.  Initially she had nausea but after several doses it has been ok.  She is exercising doing her stationary bicycle twice per week.    She has continued on wellbutrin though she decreased it once daily as she has noted that it helps her focus.    PHQ-2/9 Depression Screening    Little interest or pleasure in doing things: 0 - not at all  Feeling down, depressed, or hopeless: 1 - several days  PHQ-2 Score: 1  PHQ-2 Interpretation: Negative depression screen       DARWIN-7 Flowsheet Screening    Flowsheet Row Most Recent Value   Over the last two weeks, how often have you been bothered by the  following problems?     Feeling nervous, anxious, or on edge 1   Not being able to stop or control worrying 1   Worrying too much about different things 0   Trouble relaxing  1   Being so restless that it's hard to sit still 0   Becoming easily annoyed or irritable  0   Feeling afraid as if something awful might happen 1   How difficult have these problems made it for you to do your work, take care of things at home, or get along with other people?  Not difficult at all   DARWIN Score  4        Review of Systems   Constitutional:  Positive for appetite change.        Intentional weight loss   HENT:  Positive for rhinorrhea. Negative for congestion and postnasal drip.    Respiratory:  Negative for cough, shortness of breath and wheezing.    Cardiovascular:  Negative for chest pain, palpitations and leg swelling.   Gastrointestinal:  Negative for abdominal pain, diarrhea and nausea.   Neurological:  Positive for dizziness. Negative for headaches.   Psychiatric/Behavioral:  Positive for sleep disturbance. Negative for dysphoric mood. The patient is nervous/anxious.        Current Outpatient Medications:   •  albuterol (PROVENTIL HFA,VENTOLIN HFA) 90 mcg/act inhaler, Inhale 2 puffs every 4 (four) hours as needed for wheezing, Disp: 8.5 g, Rfl: 2  •  Azelastine HCl 137 MCG/SPRAY SOLN, USE 1 SPRAY INTO EACH NOSTRIL TWICE A DAY, Disp: 90 mL, Rfl: 1  •  budesonide (RINOCORT AQUA) 32 MCG/ACT nasal spray, 1 spray into each nostril daily, Disp: , Rfl:   •  buPROPion (WELLBUTRIN XL) 150 mg 24 hr tablet, TAKE 1 TABLET TWICE DAILY IN THE MORNING AND EVENING (Patient taking differently: Take 150 mg by mouth every morning), Disp: 30 tablet, Rfl: 1  •  Fluticasone-Salmeterol (Advair Diskus) 100-50 mcg/dose inhaler, INHALE 1 DOSE BY MOUTH TWICE DAILY. RINSE MOUTH AFTER USE, Disp: 60 blister, Rfl: 5  •  Polyethylene Glycol 3350 (MIRALAX PO), Take by mouth, Disp: , Rfl:   •  Semaglutide-Weight Management (WEGOVY) 1 MG/0.5ML, Inject 0.5 mL  "(1 mg total) under the skin once a week, Disp: 2 mL, Rfl: 0  •  naltrexone (REVIA) 50 mg tablet, Take 1/2 tablet (25mg) twice daily in the morning and evening (Patient not taking: Reported on 2/18/2025), Disp: 30 tablet, Rfl: 2      Objective   /66 (BP Location: Left arm, Patient Position: Sitting, Cuff Size: Large)   Pulse 94   Temp (!) 96.1 °F (35.6 °C) (Tympanic Core)   Resp 17   Ht 5' 3\" (1.6 m)   Wt 80.3 kg (177 lb)   SpO2 96%   BMI 31.35 kg/m²      Physical Exam  Vitals reviewed.   Constitutional:       General: She is not in acute distress.  Cardiovascular:      Rate and Rhythm: Normal rate and regular rhythm.      Pulses: Normal pulses.      Heart sounds: Normal heart sounds.   Pulmonary:      Effort: Pulmonary effort is normal. No respiratory distress.      Breath sounds: Normal breath sounds. No wheezing.   Neurological:      Mental Status: She is alert and oriented to person, place, and time.   Psychiatric:         Mood and Affect: Mood normal.         Recent Results (from the past 3 weeks)   CBC and differential    Collection Time: 02/14/25  7:48 AM   Result Value Ref Range    WBC 6.03 4.31 - 10.16 Thousand/uL    RBC 4.65 3.81 - 5.12 Million/uL    Hemoglobin 13.2 11.5 - 15.4 g/dL    Hematocrit 41.2 34.8 - 46.1 %    MCV 89 82 - 98 fL    MCH 28.4 26.8 - 34.3 pg    MCHC 32.0 31.4 - 37.4 g/dL    RDW 13.5 11.6 - 15.1 %    MPV 8.7 (L) 8.9 - 12.7 fL    Platelets 455 (H) 149 - 390 Thousands/uL    nRBC 0 /100 WBCs    Segmented % 56 43 - 75 %    Immature Grans % 0 0 - 2 %    Lymphocytes % 34 14 - 44 %    Monocytes % 7 4 - 12 %    Eosinophils Relative 2 0 - 6 %    Basophils Relative 1 0 - 1 %    Absolute Neutrophils 3.37 1.85 - 7.62 Thousands/µL    Absolute Immature Grans 0.01 0.00 - 0.20 Thousand/uL    Absolute Lymphocytes 2.06 0.60 - 4.47 Thousands/µL    Absolute Monocytes 0.40 0.17 - 1.22 Thousand/µL    Eosinophils Absolute 0.14 0.00 - 0.61 Thousand/µL    Basophils Absolute 0.05 0.00 - 0.10 " Thousands/µL   Basic metabolic panel    Collection Time: 02/14/25  7:48 AM   Result Value Ref Range    Sodium 136 135 - 147 mmol/L    Potassium 3.7 3.5 - 5.3 mmol/L    Chloride 102 96 - 108 mmol/L    CO2 25 21 - 32 mmol/L    ANION GAP 9 4 - 13 mmol/L    BUN 7 5 - 25 mg/dL    Creatinine 0.88 0.60 - 1.30 mg/dL    Glucose, Fasting 90 65 - 99 mg/dL    Calcium 9.8 8.4 - 10.2 mg/dL    eGFR 84 ml/min/1.73sq m

## 2025-02-18 NOTE — ASSESSMENT & PLAN NOTE
-mild, stable  -check iron panel  Orders:  •  Iron Panel (Includes Ferritin, Iron Sat%, Iron, and TIBC); Future  •  CBC and differential; Future  •  Comprehensive metabolic panel; Future  •  TSH, 3rd generation with Free T4 reflex; Future

## 2025-02-18 NOTE — ASSESSMENT & PLAN NOTE
-initially started on Contrave due to insurance but wegovy then approved  -doing well on wegovy currently  -follows with Weight Loss Management

## 2025-03-08 DIAGNOSIS — E66.01 CLASS 2 SEVERE OBESITY DUE TO EXCESS CALORIES WITH SERIOUS COMORBIDITY AND BODY MASS INDEX (BMI) OF 38.0 TO 38.9 IN ADULT (HCC): ICD-10-CM

## 2025-03-08 DIAGNOSIS — E66.812 CLASS 2 SEVERE OBESITY DUE TO EXCESS CALORIES WITH SERIOUS COMORBIDITY AND BODY MASS INDEX (BMI) OF 38.0 TO 38.9 IN ADULT (HCC): ICD-10-CM

## 2025-03-10 RX ORDER — BUPROPION HYDROCHLORIDE 150 MG/1
TABLET ORAL
Qty: 180 TABLET | Refills: 1 | Status: SHIPPED | OUTPATIENT
Start: 2025-03-10

## 2025-03-18 DIAGNOSIS — J45.20 MILD INTERMITTENT ASTHMA WITHOUT COMPLICATION: ICD-10-CM

## 2025-03-18 RX ORDER — ALBUTEROL SULFATE 90 UG/1
INHALANT RESPIRATORY (INHALATION)
Qty: 6.7 G | Refills: 5 | Status: SHIPPED | OUTPATIENT
Start: 2025-03-18

## 2025-03-19 DIAGNOSIS — E66.812 CLASS 2 SEVERE OBESITY DUE TO EXCESS CALORIES WITH SERIOUS COMORBIDITY AND BODY MASS INDEX (BMI) OF 38.0 TO 38.9 IN ADULT (HCC): Primary | ICD-10-CM

## 2025-03-19 DIAGNOSIS — E66.01 CLASS 2 SEVERE OBESITY DUE TO EXCESS CALORIES WITH SERIOUS COMORBIDITY AND BODY MASS INDEX (BMI) OF 38.0 TO 38.9 IN ADULT (HCC): Primary | ICD-10-CM

## 2025-03-27 ENCOUNTER — ANNUAL EXAM (OUTPATIENT)
Age: 35
End: 2025-03-27
Payer: COMMERCIAL

## 2025-03-27 VITALS
BODY MASS INDEX: 29.06 KG/M2 | DIASTOLIC BLOOD PRESSURE: 68 MMHG | SYSTOLIC BLOOD PRESSURE: 110 MMHG | WEIGHT: 164 LBS | HEIGHT: 63 IN

## 2025-03-27 DIAGNOSIS — Z11.51 SCREENING FOR HUMAN PAPILLOMAVIRUS (HPV): ICD-10-CM

## 2025-03-27 DIAGNOSIS — Z01.419 ENCOUNTER FOR ANNUAL ROUTINE GYNECOLOGICAL EXAMINATION: Primary | ICD-10-CM

## 2025-03-27 DIAGNOSIS — Z12.4 SCREENING FOR CERVICAL CANCER: ICD-10-CM

## 2025-03-27 DIAGNOSIS — N83.209 CYST OF OVARY, UNSPECIFIED LATERALITY: ICD-10-CM

## 2025-03-27 PROCEDURE — 99395 PREV VISIT EST AGE 18-39: CPT | Performed by: OBSTETRICS & GYNECOLOGY

## 2025-03-27 PROCEDURE — G0476 HPV COMBO ASSAY CA SCREEN: HCPCS | Performed by: OBSTETRICS & GYNECOLOGY

## 2025-03-27 PROCEDURE — G0145 SCR C/V CYTO,THINLAYER,RESCR: HCPCS | Performed by: OBSTETRICS & GYNECOLOGY

## 2025-03-27 NOTE — PROGRESS NOTES
Reba Mccarthy  1990      CC:  Yearly exam    S:  34 y.o. female here for yearly exam.     Period Cycle (Days): 30  Period Duration (Days): 4  Period Pattern: Regular  Menstrual Flow: Heavy  Menstrual Control: Maxi pad  Menstrual Control Change Freq (Hours): 3-4  Dysmenorrhea: (!) Severe  Dysmenorrhea Symptoms: Cramping, Nausea, Diarrhea    Does note some right sided pelvic pain that is worse with her menstrual cycles.  Known left sided dermoid cyst (2cm).     She denies vaginal discharge, itching, pelvic pain.   She has no urinary concerns, does not have incontinence.  No bowel concerns.  No breast concerns.     Sexual activity: She is sexually active.    Contraception: She uses vasectomy for contraception.     Last Pap: 3/25/24 - ASCUS, HPV neg    We reviewed ASCCP guidelines for Pap testing today.     Family hx of breast cancer: no  Family hx of ovarian cancer: no  Family hx of colon cancer: no      Current Outpatient Medications:     albuterol (PROVENTIL HFA,VENTOLIN HFA) 90 mcg/act inhaler, TAKE 2 PUFFS BY MOUTH EVERY 4 HOURS AS NEEDED FOR WHEEZE, Disp: 6.7 g, Rfl: 5    Azelastine HCl 137 MCG/SPRAY SOLN, USE 1 SPRAY INTO EACH NOSTRIL TWICE A DAY, Disp: 90 mL, Rfl: 1    budesonide (RINOCORT AQUA) 32 MCG/ACT nasal spray, 1 spray into each nostril daily, Disp: , Rfl:     buPROPion (WELLBUTRIN XL) 150 mg 24 hr tablet, TAKE 1 TABLET TWICE DAILY IN THE MORNING AND EVENING, Disp: 180 tablet, Rfl: 1    Fluticasone-Salmeterol (Advair Diskus) 100-50 mcg/dose inhaler, INHALE 1 DOSE BY MOUTH TWICE DAILY. RINSE MOUTH AFTER USE, Disp: 60 blister, Rfl: 5    Polyethylene Glycol 3350 (MIRALAX PO), Take by mouth, Disp: , Rfl:     Semaglutide-Weight Management (WEGOVY) 1.7 MG/0.75ML, Inject 0.75 mL (1.7 mg total) under the skin once a week, Disp: 3 mL, Rfl: 1    naltrexone (REVIA) 50 mg tablet, Take 1/2 tablet (25mg) twice daily in the morning and evening (Patient not taking: Reported on 3/27/2025), Disp: 30 tablet,  "Rfl: 2  Patient Active Problem List   Diagnosis    Mild intermittent asthma without complication    Eczema    Allergic rhinitis    Class 2 severe obesity due to excess calories with serious comorbidity and body mass index (BMI) of 38.0 to 38.9 in adult (HCC)    Left ovarian cyst    Status post primary low transverse  section    History of insulin controlled gestational diabetes mellitus    Chest wall pain    Anxiety    Constipation    Thrombocythemia     Past Medical History:   Diagnosis Date    Allergic     Asthma     Chronic midline low back pain without sciatica 2023    Chronic pain of both shoulders 2023    Fibroadenoma of left breast     Flu-like symptoms 2023    Prediabetes 2023     Family History   Problem Relation Age of Onset    Hypertension Mother     Arthritis Mother     Hypothyroidism Mother     Stroke Father     Hypertension Father     Hypothyroidism Sister     Hypothyroidism Sister     Arthritis Maternal Grandmother     Cancer Maternal Grandfather     Stroke Paternal Grandmother     No Known Problems Paternal Grandfather     Breast cancer Neg Hx     Ovarian cancer Neg Hx     Cervical cancer Neg Hx     Uterine cancer Neg Hx           Review of Systems   Respiratory: Negative.    Cardiovascular: Negative.    Gastrointestinal: Negative for constipation and diarrhea.     O:  Blood pressure 110/68, height 5' 3\" (1.6 m), weight 74.4 kg (164 lb), last menstrual period 2025, not currently breastfeeding.    Patient appears well and is not in distress  Breasts are symmetrical without mass, tenderness, nipple discharge, skin changes or adenopathy.   Abdomen is soft and nontender without masses.   External genitals are normal without lesions or rashes.  Urethral meatus and urethra are normal  Bladder is normal to palpation  Vagina is normal without discharge or bleeding.   Cervix is normal without discharge or lesion.   Uterus is normal, mobile, nontender without palpable " mass.  Adnexa are normal, nontender, without palpable mass.     A:  Yearly exam.     P:   Pap & HPV today   Mammo age 38   Pelvic US ordered - can obtain if pain continues or worsens.    RTO one year for yearly exam or sooner as needed.

## 2025-03-28 LAB
HPV HR 12 DNA CVX QL NAA+PROBE: NEGATIVE
HPV16 DNA CVX QL NAA+PROBE: NEGATIVE
HPV18 DNA CVX QL NAA+PROBE: NEGATIVE

## 2025-03-31 ENCOUNTER — OFFICE VISIT (OUTPATIENT)
Dept: BARIATRICS | Facility: CLINIC | Age: 35
End: 2025-03-31
Payer: COMMERCIAL

## 2025-03-31 VITALS
WEIGHT: 162.6 LBS | BODY MASS INDEX: 28.81 KG/M2 | SYSTOLIC BLOOD PRESSURE: 100 MMHG | HEART RATE: 89 BPM | DIASTOLIC BLOOD PRESSURE: 60 MMHG | HEIGHT: 63 IN

## 2025-03-31 DIAGNOSIS — E66.812 CLASS 2 SEVERE OBESITY DUE TO EXCESS CALORIES WITH SERIOUS COMORBIDITY AND BODY MASS INDEX (BMI) OF 38.0 TO 38.9 IN ADULT (HCC): ICD-10-CM

## 2025-03-31 DIAGNOSIS — E66.01 CLASS 2 SEVERE OBESITY DUE TO EXCESS CALORIES WITH SERIOUS COMORBIDITY AND BODY MASS INDEX (BMI) OF 38.0 TO 38.9 IN ADULT (HCC): ICD-10-CM

## 2025-03-31 DIAGNOSIS — E66.3 OVERWEIGHT WITH BODY MASS INDEX (BMI) OF 28 TO 28.9 IN ADULT: Primary | ICD-10-CM

## 2025-03-31 PROCEDURE — 99214 OFFICE O/P EST MOD 30 MIN: CPT | Performed by: NURSE PRACTITIONER

## 2025-03-31 NOTE — PROGRESS NOTES
Assessment/Plan:     Overweight with body mass index (BMI) of 28 to 28.9 in adult  - Patient is pursuing Conservative Program and follow up visits with medical weight management provider  - Initial weight loss goal of 5-10% weight loss for improved health. Weight loss can improve patient's co-morbid conditions and/or prevent weight-related complications.  - Explained the importance of continuing lifestyle changes in addition to any anti-obesity medications.   - Labs reviewed from 2023, 2024 and 2/2025 - due for full screening labs    General Recommendations:  Nutrition:  Eat breakfast daily.  Do not skip meals.      Food log (ie.) www.happin!.com, sparkpeople.com, EPS.com, PhoRent.com, etc.     Practice mindful eating.  Be sure to set aside time to eat, eat slowly, and savor your food.     Hydration:    At least 64oz of water daily.  No sugar sweetened beverages.  No juice (eat the fruit instead).     Exercise:  Studies have shown that the ideal exercise goal is somewhere between 150 to 300 minutes of moderate intensity exercise a week.  Start with exercising 10 minutes every other day and gradually increase physical activity with a goal of at least 150 minutes of moderate intensity exercise a week, divided over at least 3 days a week.  An example of this would be exercising 30 minutes a day, 5 days a week.  Resistance training can increase muscle mass and increase our resting metabolic rate.   FULL BODY resistance training is recommended 2-3 times a week.  Do not do this on consecutive days to allow for muscle recovery.     Aim for a bare minimum 5000 steps, even on days you do not exercise.     Monitoring:   Weigh yourself daily.  If this causes undue stress, then just weigh yourself once a week.  Weigh yourself the same time of the day with the same amount of clothing on.  Preferably this should be done after waking up, before you eat, and with no clothing or minimal clothing on.     Specific  "Goals:  Calorie goal:  [id number] italo/day for weight loss and 1800 to 1900 italo/day for weight maintenance  Patient lifestyle habits were reviewed and patient was congratulated on her weight loss since last visit.  Nutrition was discussed and patient was encouraged to better focus on her nutrition choices and make sure she is focused on fiber and protein throughout the day.  Discussed how consuming smaller amounts of \" unhealthy\" food while on Wegovy may not be beneficial long-term to maintain her weight loss off the medication.  She was instructed to track her food and nutrition and to try to make sure she is hitting her nutritional requirements and avoiding skipping meals.  Medications were discussed and patient will reduce her dose of Wegovy to 1 mg for her next month due to her vomiting episodes and that she is close to her goal weight.  May reduce even further to 0.5 mg.  Would like to maintain on the medication for few months to allow her to focus on nutrition for maintenance and maintaining a healthy weight before weaning off the medication.  If she continues to lose weight or not tolerate the medication may go back to bupropion/naltrexone to maintain her weight loss.  Patient was in agreement with this plan and will follow-up in the office in 2 to 3 months.         Reba was seen today for follow-up.    Diagnoses and all orders for this visit:    Overweight with body mass index (BMI) of 28 to 28.9 in adult    Class 2 severe obesity due to excess calories with serious comorbidity and body mass index (BMI) of 38.0 to 38.9 in adult ([facility])  -     Semaglutide-Weight Management (WEGOVY) 1 MG/0.5ML; Inject 0.5 mL (1 mg total) under the skin once a week        Total time spent reviewing chart, interviewing patient, examining patient, discussing plan, answering all questions, and documentin minutes with >50% face-to-face time with the patient.    Follow up in approximately 2 months with Non-Surgical " "Physician/Advanced Practitioner.    Subjective:   Chief Complaint   Patient presents with    Follow-up     Pt is here for MWM f/u. Waist - 33\"       Patient ID: Reba Mccarthy  is a 34 y.o. female with excess weight/obesity here to pursue weight management.  Patient is pursuing Conservative Program.   Most recent notes and records were reviewed.    HPI    Wt Readings from Last 20 Encounters:   03/31/25 73.8 kg (162 lb 9.6 oz)   03/27/25 74.4 kg (164 lb)   02/18/25 80.3 kg (177 lb)   12/18/24 85.3 kg (188 lb)   08/07/24 92.1 kg (203 lb)   07/23/24 94.4 kg (208 lb 3.2 oz)   06/20/24 96.9 kg (213 lb 11.2 oz)   05/22/24 97 kg (213 lb 12.8 oz)   05/01/24 98 kg (216 lb)   03/25/24 98.8 kg (217 lb 12.8 oz)   03/20/24 97.5 kg (215 lb)   12/27/23 100 kg (221 lb)   12/12/23 100 kg (220 lb 6.4 oz)   11/22/23 100 kg (221 lb 3.2 oz)   09/06/23 100 kg (221 lb)   05/24/23 97.5 kg (215 lb)   03/13/23 94.1 kg (207 lb 6.4 oz)   03/06/23 93.1 kg (205 lb 3.2 oz)   02/13/23 95.4 kg (210 lb 6.4 oz)   01/24/23 97.2 kg (214 lb 3.2 oz)       Patient presents today to medical weight management office for follow up.  Patient was started on Wegovy at last office visit and recently advance to the 1.7 mg dose.  She had been tolerating medication well until this dose for now she experienced occasional vomiting.  She would like to discuss going back to a lower dose and possibly weaning off as she is almost at a comfortable weight and does not want to maintain on the medication.  She had previously been on bupropion and naltrexone and felt that that also helped her appetite suppression and would be willing to go back to that to maintain her weight loss.  Patient admits she has not been as mindful of her nutrition but her portions have been smaller.  She states there are some days where she eats better for her than others and she is trying to be mindful of her protein intake.    Patient has a panel of blood work pending from her primary care " that she will complete.        Weight loss medication and dose: Wegovy 1.7 milligrams  Started weight and date: 213.8 lbs in 2024  Current weight: 162.6 lbs (188 lbs at last OV)  Difference: -51.2 lbs (-25.4 since last OV)  Percentage of weight loss: -23.9  Goal weight: 145 lbs     Starting BMI: 37.87  Current BMI: 28.8     Waist Measurements:  2024: 40.5 in  3/2025: 33 in     Nutrition Prescription  Calories: 1,300-1,400 kcal  Protein: 63-78 g  Fluid: 61 ounces        Diet recall:  B: skips - coffee with hot cocoa powder  L: sandwich - ham and cheese with vegetables OR low italo wrap with ham and cheese  S: no  D: protein (chicken or pork) with rice or pasta with vegetables  S: oatmeal mug cake      Hydration: coffee with hot cocoa powder, diet soda - 1-2 cans, water - 64-80 oz  Alcohol: once every couple of week  Smoking: no  Exercise: stationary bike with bands  Occupation: stay at home mom  Sleep: varies  STOP ban/8      The following portions of the patient's history were reviewed and updated as appropriate: allergies, current medications, past family history, past medical history, past social history, past surgical history, and problem list.    Family History   Problem Relation Age of Onset    Hypertension Mother     Arthritis Mother     Hypothyroidism Mother     Stroke Father     Hypertension Father     Hypothyroidism Sister     Hypothyroidism Sister     Arthritis Maternal Grandmother     Cancer Maternal Grandfather     Stroke Paternal Grandmother     No Known Problems Paternal Grandfather     Breast cancer Neg Hx     Ovarian cancer Neg Hx     Cervical cancer Neg Hx     Uterine cancer Neg Hx         Review of Systems   Constitutional:  Negative for fatigue.   HENT:  Negative for sore throat.    Respiratory:  Negative for cough and shortness of breath.    Cardiovascular:  Negative for chest pain, palpitations and leg swelling.   Gastrointestinal:  Negative for abdominal pain, constipation, diarrhea and  "nausea.   Genitourinary:  Negative for dysuria.   Musculoskeletal:  Negative for arthralgias and back pain.   Skin:  Negative for rash.   Neurological:  Negative for headaches.   Psychiatric/Behavioral:  Negative for dysphoric mood. The patient is not nervous/anxious.        Objective:  /60 (Patient Position: Sitting, Cuff Size: Large)   Pulse 89   Ht 5' 3\" (1.6 m)   Wt 73.8 kg (162 lb 9.6 oz)   LMP 03/21/2025 (Exact Date)   BMI 28.80 kg/m²     Physical Exam  Vitals and nursing note reviewed.   Constitutional:       Appearance: Normal appearance. She is obese.   HENT:      Head: Normocephalic.   Pulmonary:      Effort: Pulmonary effort is normal.   Neurological:      General: No focal deficit present.      Mental Status: She is alert and oriented to person, place, and time.   Psychiatric:         Mood and Affect: Mood normal.         Behavior: Behavior normal.         Thought Content: Thought content normal.         Judgment: Judgment normal.            Labs   Most recent labs reviewed   Lab Results   Component Value Date    SODIUM 136 02/14/2025    K 3.7 02/14/2025     02/14/2025    CO2 25 02/14/2025    AGAP 9 02/14/2025    BUN 7 02/14/2025    CREATININE 0.88 02/14/2025    GLUC 103 02/23/2023    GLUF 90 02/14/2025    CALCIUM 9.8 02/14/2025    AST 11 (L) 03/22/2023    ALT 10 03/22/2023    ALKPHOS 78 03/22/2023    TP 6.8 03/22/2023    TBILI 0.69 03/22/2023    EGFR 84 02/14/2025     Lab Results   Component Value Date    HGBA1C 5.5 08/06/2024     Lab Results   Component Value Date    WVF5GTJSNBMT 2.743 11/22/2023    TSH 2.14 11/24/2021     Lab Results   Component Value Date    CHOLESTEROL 147 11/22/2023     Lab Results   Component Value Date    HDL 39 (L) 11/22/2023     Lab Results   Component Value Date    TRIG 69 11/22/2023     Lab Results   Component Value Date    LDLCALC 94 11/22/2023     "

## 2025-03-31 NOTE — ASSESSMENT & PLAN NOTE
- Patient is pursuing Conservative Program and follow up visits with medical weight management provider  - Initial weight loss goal of 5-10% weight loss for improved health. Weight loss can improve patient's co-morbid conditions and/or prevent weight-related complications.  - Explained the importance of continuing lifestyle changes in addition to any anti-obesity medications.   - Labs reviewed from 2023, 2024 and 2/2025 - due for full screening labs    General Recommendations:  Nutrition:  Eat breakfast daily.  Do not skip meals.      Food log (ie.) www.GateMe.com, sparkpeople.com, loseit.com, calorieking.com, etc.     Practice mindful eating.  Be sure to set aside time to eat, eat slowly, and savor your food.     Hydration:    At least 64oz of water daily.  No sugar sweetened beverages.  No juice (eat the fruit instead).     Exercise:  Studies have shown that the ideal exercise goal is somewhere between 150 to 300 minutes of moderate intensity exercise a week.  Start with exercising 10 minutes every other day and gradually increase physical activity with a goal of at least 150 minutes of moderate intensity exercise a week, divided over at least 3 days a week.  An example of this would be exercising 30 minutes a day, 5 days a week.  Resistance training can increase muscle mass and increase our resting metabolic rate.   FULL BODY resistance training is recommended 2-3 times a week.  Do not do this on consecutive days to allow for muscle recovery.     Aim for a bare minimum 5000 steps, even on days you do not exercise.     Monitoring:   Weigh yourself daily.  If this causes undue stress, then just weigh yourself once a week.  Weigh yourself the same time of the day with the same amount of clothing on.  Preferably this should be done after waking up, before you eat, and with no clothing or minimal clothing on.     Specific Goals:  Calorie goal:  [id number] italo/day for weight loss and 1800 to 1900 italo/day for  "weight maintenance  Patient lifestyle habits were reviewed and patient was congratulated on her weight loss since last visit.  Nutrition was discussed and patient was encouraged to better focus on her nutrition choices and make sure she is focused on fiber and protein throughout the day.  Discussed how consuming smaller amounts of \" unhealthy\" food while on Wegovy may not be beneficial long-term to maintain her weight loss off the medication.  She was instructed to track her food and nutrition and to try to make sure she is hitting her nutritional requirements and avoiding skipping meals.  Medications were discussed and patient will reduce her dose of Wegovy to 1 mg for her next month due to her vomiting episodes and that she is close to her goal weight.  May reduce even further to 0.5 mg.  Would like to maintain on the medication for few months to allow her to focus on nutrition for maintenance and maintaining a healthy weight before weaning off the medication.  If she continues to lose weight or not tolerate the medication may go back to bupropion/naltrexone to maintain her weight loss.  Patient was in agreement with this plan and will follow-up in the office in 2 to 3 months.  "

## 2025-04-02 ENCOUNTER — RESULTS FOLLOW-UP (OUTPATIENT)
Dept: LABOR AND DELIVERY | Facility: HOSPITAL | Age: 35
End: 2025-04-02

## 2025-04-02 LAB
LAB AP GYN PRIMARY INTERPRETATION: NORMAL
Lab: NORMAL

## 2025-04-05 ENCOUNTER — APPOINTMENT (OUTPATIENT)
Dept: RADIOLOGY | Facility: MEDICAL CENTER | Age: 35
End: 2025-04-05
Payer: COMMERCIAL

## 2025-04-05 ENCOUNTER — OFFICE VISIT (OUTPATIENT)
Dept: URGENT CARE | Facility: MEDICAL CENTER | Age: 35
End: 2025-04-05
Payer: COMMERCIAL

## 2025-04-05 VITALS
HEART RATE: 113 BPM | OXYGEN SATURATION: 96 % | DIASTOLIC BLOOD PRESSURE: 52 MMHG | SYSTOLIC BLOOD PRESSURE: 119 MMHG | TEMPERATURE: 101.6 F | RESPIRATION RATE: 18 BRPM

## 2025-04-05 DIAGNOSIS — R05.1 ACUTE COUGH: ICD-10-CM

## 2025-04-05 DIAGNOSIS — J45.21 MILD INTERMITTENT ASTHMA WITH ACUTE EXACERBATION: ICD-10-CM

## 2025-04-05 DIAGNOSIS — R05.1 ACUTE COUGH: Primary | ICD-10-CM

## 2025-04-05 PROCEDURE — G0382 LEV 3 HOSP TYPE B ED VISIT: HCPCS | Performed by: NURSE PRACTITIONER

## 2025-04-05 PROCEDURE — 71046 X-RAY EXAM CHEST 2 VIEWS: CPT

## 2025-04-05 RX ORDER — AZITHROMYCIN 250 MG/1
TABLET, FILM COATED ORAL
Qty: 6 TABLET | Refills: 0 | Status: SHIPPED | OUTPATIENT
Start: 2025-04-05 | End: 2025-04-09

## 2025-04-05 RX ORDER — PREDNISONE 20 MG/1
40 TABLET ORAL DAILY
Qty: 10 TABLET | Refills: 0 | Status: SHIPPED | OUTPATIENT
Start: 2025-04-05 | End: 2025-04-10

## 2025-04-05 RX ORDER — BENZONATATE 200 MG/1
200 CAPSULE ORAL 3 TIMES DAILY PRN
Qty: 20 CAPSULE | Refills: 0 | Status: SHIPPED | OUTPATIENT
Start: 2025-04-05

## 2025-04-05 NOTE — PATIENT INSTRUCTIONS
Antibiotics as directed    Prednisone once a day for 5 days  Albuterol inhaler every 4-6 hours as needed for chest tightness/shortness of breath/wheezing  Tessalon 1-3 times a day as needed for cough  OTC cough medicine Chestal as needed for cough  Mucinex for congestion  Saline nasal spray for congestion  Increase hydration and rest as needed  Follow up with PCP if symptoms are not improving  If worsening symptoms ie chest pain, difficulty breathing, please go to the Emergency Room

## 2025-04-05 NOTE — PROGRESS NOTES
[facility]        NAME: Reba Mccarthy is a 34 y.o. female  : 1990    MRN: [id number]  DATE: 2025  TIME: 5:11 PM    Assessment and Plan   Acute cough [R05.1]  1. Acute cough  predniSONE 20 mg tablet    benzonatate (TESSALON) 200 MG capsule    azithromycin (ZITHROMAX) 250 mg tablet    XR chest pa and lateral      2. Mild intermittent asthma with acute exacerbation  azithromycin (ZITHROMAX) 250 mg tablet        Patient is in no acute distress and vital signs are stable with noted fever and mild tachycardia upon exam.  Given duration of symptoms with no improvement will start prednisone and benzonatate.  Patient has albuterol nebulizer and inhaler at home that she will continue to use.  Given history of asthma and duration of symptoms will start azithromycin at this time.  Chest x-ray was done at patient request.  Noted possible right lower lobe consolidation on preliminary reading. Discussed supportive care and return precautions. Patient/Parent agreeable to plan of care and all questions answered. Note for work/school given as needed.      Patient Instructions       Follow up with PCP in 3-5 days.  Proceed to  ER if symptoms worsen.    If tests have been performed at [facility], our office will contact you with results if changes need to be made to the care plan discussed with you at the visit.  You can review your full results on [facility].    Chief Complaint     Chief Complaint   Patient presents with   • Fever     Patient c/o fever, chest congestion and cough x 1 week          History of Present Illness       Started: 1 week  Positive: cough, fever 100-102, productive cough, congestion, mild ST, body sensitivity, back pain, HA, fatigue, mild SOB intermittently, chest tightness  Treatment: tylenol, nebulizer  Reports tried OTC medication but threw it up  Hx asthma         Review of Systems   Review of Systems   Constitutional:  Positive for fever.   HENT:  Positive for  congestion and sore throat.    Respiratory:  Positive for cough, chest tightness and shortness of breath.    Musculoskeletal:  Positive for back pain.   Neurological:  Positive for headaches.         Current Medications       Current Outpatient Medications:   •  azithromycin (ZITHROMAX) 250 mg tablet, Take 2 tablets today then 1 tablet daily x 4 days, Disp: 6 tablet, Rfl: 0  •  benzonatate (TESSALON) 200 MG capsule, Take 1 capsule (200 mg total) by mouth 3 (three) times a day as needed for cough, Disp: 20 capsule, Rfl: 0  •  predniSONE 20 mg tablet, Take 2 tablets (40 mg total) by mouth daily for 5 days, Disp: 10 tablet, Rfl: 0  •  albuterol (PROVENTIL HFA,VENTOLIN HFA) 90 mcg/act inhaler, TAKE 2 PUFFS BY MOUTH EVERY 4 HOURS AS NEEDED FOR WHEEZE, Disp: 6.7 g, Rfl: 5  •  Azelastine HCl 137 MCG/SPRAY SOLN, USE 1 SPRAY INTO EACH NOSTRIL TWICE A DAY, Disp: 90 mL, Rfl: 1  •  budesonide (RINOCORT AQUA) 32 MCG/ACT nasal spray, 1 spray into each nostril daily, Disp: , Rfl:   •  buPROPion (WELLBUTRIN XL) 150 mg 24 hr tablet, TAKE 1 TABLET TWICE DAILY IN THE MORNING AND EVENING, Disp: 180 tablet, Rfl: 1  •  Fluticasone-Salmeterol (Advair Diskus) 100-50 mcg/dose inhaler, INHALE 1 DOSE BY MOUTH TWICE DAILY. RINSE MOUTH AFTER USE, Disp: 60 blister, Rfl: 5  •  Polyethylene Glycol 3350 (MIRALAX PO), Take by mouth, Disp: , Rfl:   •  Semaglutide-Weight Management (WEGOVY) 1 MG/0.5ML, Inject 0.5 mL (1 mg total) under the skin once a week, Disp: 2 mL, Rfl: 0    Current Allergies     Allergies as of 04/05/2025 - Reviewed 04/05/2025   Allergen Reaction Noted   • Aspirin Swelling 11/22/2021   • Shellfish allergy - food allergy Hives, Itching, and Swelling 05/10/2022            The following portions of the patient's history were reviewed and updated as appropriate: allergies, current medications, past family history, past medical history, past social history, past surgical history and problem list.     Past Medical History:   Diagnosis  Date   • Allergic    • Asthma    • Chronic midline low back pain without sciatica 2023   • Chronic pain of both shoulders 2023   • Fibroadenoma of left breast    • Flu-like symptoms 2023   • Prediabetes 2023       Past Surgical History:   Procedure Laterality Date   • BREAST BIOPSY Left     Done in  for a Fibroadenoma   • CHOLECYSTECTOMY LAPAROSCOPIC N/A 2023    Procedure: CHOLECYSTECTOMY LAPAROSCOPIC W/ INTRAOP CHOLANGIOGRAM;  Surgeon: Baltazar Wilcox MD;  Location: [facility];  Service: General   • CYST REMOVAL N/A     low back   • SC  DELIVERY ONLY N/A 2023    Procedure:  SECTION ();  Surgeon: Ashley Jones MD;  Location: [facility];  Service: Obstetrics   • WISDOM TOOTH EXTRACTION         Family History   Problem Relation Age of Onset   • Hypertension Mother    • Arthritis Mother    • Hypothyroidism Mother    • Stroke Father    • Hypertension Father    • Hypothyroidism Sister    • Hypothyroidism Sister    • Arthritis Maternal Grandmother    • Cancer Maternal Grandfather    • Stroke Paternal Grandmother    • No Known Problems Paternal Grandfather    • Breast cancer Neg Hx    • Ovarian cancer Neg Hx    • Cervical cancer Neg Hx    • Uterine cancer Neg Hx          Medications have been verified.        Objective   /52   Pulse (!) 113   Temp (!) 101.6 °F (38.7 °C)   Resp 18   LMP 2025 (Exact Date)   SpO2 96%   Patient's last menstrual period was 2025 (exact date).       Physical Exam     Physical Exam  Constitutional:       General: She is not in acute distress.     Appearance: Normal appearance. She is not ill-appearing.   HENT:      Head: Normocephalic and atraumatic.      Right Ear: Hearing, tympanic membrane, ear canal and external ear normal.      Left Ear: Hearing, tympanic membrane, ear canal and external ear normal.      Nose: No congestion.      Right Sinus: No maxillary sinus tenderness or frontal sinus  tenderness.      Left Sinus: No maxillary sinus tenderness or frontal sinus tenderness.      Mouth/Throat:      Lips: Pink.      Mouth: Mucous membranes are moist.      Pharynx: Oropharynx is clear.   Cardiovascular:      Rate and Rhythm: Normal rate and regular rhythm.   Pulmonary:      Effort: Pulmonary effort is normal.      Breath sounds: Examination of the right-lower field reveals decreased breath sounds and wheezing. Decreased breath sounds, wheezing and rhonchi (scattered, clears with cough) present.      Comments: Moist cough on exam  Musculoskeletal:         General: Normal range of motion.   Lymphadenopathy:      Cervical: No cervical adenopathy.   Skin:     General: Skin is warm and dry.   Neurological:      Mental Status: She is alert and oriented to person, place, and time.

## 2025-04-06 ENCOUNTER — RESULTS FOLLOW-UP (OUTPATIENT)
Dept: URGENT CARE | Facility: MEDICAL CENTER | Age: 35
End: 2025-04-06

## 2025-05-15 ENCOUNTER — TELEPHONE (OUTPATIENT)
Age: 35
End: 2025-05-15

## 2025-05-22 DIAGNOSIS — E66.3 OVERWEIGHT WITH BODY MASS INDEX (BMI) OF 28 TO 28.9 IN ADULT: Primary | ICD-10-CM

## 2025-06-16 ENCOUNTER — OFFICE VISIT (OUTPATIENT)
Dept: BARIATRICS | Facility: CLINIC | Age: 35
End: 2025-06-16
Payer: COMMERCIAL

## 2025-06-16 VITALS
DIASTOLIC BLOOD PRESSURE: 72 MMHG | BODY MASS INDEX: 26.82 KG/M2 | HEART RATE: 81 BPM | SYSTOLIC BLOOD PRESSURE: 112 MMHG | OXYGEN SATURATION: 98 % | WEIGHT: 151.4 LBS | HEIGHT: 63 IN

## 2025-06-16 DIAGNOSIS — Z13.220 SCREENING CHOLESTEROL LEVEL: ICD-10-CM

## 2025-06-16 DIAGNOSIS — E66.3 OVERWEIGHT WITH BODY MASS INDEX (BMI) OF 28 TO 28.9 IN ADULT: Primary | ICD-10-CM

## 2025-06-16 PROCEDURE — 99214 OFFICE O/P EST MOD 30 MIN: CPT | Performed by: NURSE PRACTITIONER

## 2025-06-16 NOTE — ASSESSMENT & PLAN NOTE
Patient has transitioned from class II obesity to overweight with the help of medical weight management and GLP-1 medications  - Patient is pursuing Conservative Program and follow up visits with medical weight management provider  - Initial weight loss goal of 5-10% weight loss for improved health. Weight loss can improve patient's co-morbid conditions and/or prevent weight-related complications.  - Explained the importance of continuing lifestyle changes in addition to any anti-obesity medications.   - Labs reviewed from 2023, 2024 and 2/2025 -encourage patient to get lab work that was ordered by PCP, will add lipid panel     General Recommendations:  Nutrition:  Eat breakfast daily.  Do not skip meals.      Food log (ie.) www.[employer].com, sparkpeople.com, loseit.com, calorieking.com, etc.     Practice mindful eating.  Be sure to set aside time to eat, eat slowly, and savor your food.     Hydration:    At least 64oz of water daily.  No sugar sweetened beverages.  No juice (eat the fruit instead).     Exercise:  Studies have shown that the ideal exercise goal is somewhere between 150 to 300 minutes of moderate intensity exercise a week.  Start with exercising 10 minutes every other day and gradually increase physical activity with a goal of at least 150 minutes of moderate intensity exercise a week, divided over at least 3 days a week.  An example of this would be exercising 30 minutes a day, 5 days a week.  Resistance training can increase muscle mass and increase our resting metabolic rate.   FULL BODY resistance training is recommended 2-3 times a week.  Do not do this on consecutive days to allow for muscle recovery.     Aim for a bare minimum 5000 steps, even on days you do not exercise.     Monitoring:   Weigh yourself daily.  If this causes undue stress, then just weigh yourself once a week.  Weigh yourself the same time of the day with the same amount of clothing on.  Preferably this should be done  after waking up, before you eat, and with no clothing or minimal clothing on.     Specific Goals:  Calorie goal:  5387-3876 italo/day for weight loss and 1800 to 1900 italo/day for weight maintenance  Patient lifestyle habits were reviewed and patient was congratulated on her weight loss since last visit.  Nutrition was discussed and patient was encouraged to go back to tracking her food daily and focusing on protein with each meal.  Also encourage patient to measure out her portions and make sure she is avoiding skipping meals to get an adequate nutrition daily.  Medications were discussed and patient will continue with Wegovy 1.7 mg for the next 1 to 2 months until she has her weight loss goal.  Then we will reduce the dose of Wegovy to 1 mg weekly and maintain at this dose.  If she continues to have side effects or continues to lose weight on the medication make discussed witching back to bupropion and naltrexone as she had success on this in the past.  Patient will follow-up in the office in 2 to 3 months.

## 2025-06-16 NOTE — PROGRESS NOTES
Assessment/Plan:     Overweight with body mass index (BMI) of 28 to 28.9 in adult  Patient has transitioned from class II obesity to overweight with the help of medical weight management and GLP-1 medications  - Patient is pursuing Conservative Program and follow up visits with medical weight management provider  - Initial weight loss goal of 5-10% weight loss for improved health. Weight loss can improve patient's co-morbid conditions and/or prevent weight-related complications.  - Explained the importance of continuing lifestyle changes in addition to any anti-obesity medications.   - Labs reviewed from 2023, 2024 and 2/2025 -encourage patient to get lab work that was ordered by PCP, will add lipid panel     General Recommendations:  Nutrition:  Eat breakfast daily.  Do not skip meals.      Food log (ie.) www.[employer].com, sparkpeople.com, loseit.com, calorieking.com, etc.     Practice mindful eating.  Be sure to set aside time to eat, eat slowly, and savor your food.     Hydration:    At least 64oz of water daily.  No sugar sweetened beverages.  No juice (eat the fruit instead).     Exercise:  Studies have shown that the ideal exercise goal is somewhere between 150 to 300 minutes of moderate intensity exercise a week.  Start with exercising 10 minutes every other day and gradually increase physical activity with a goal of at least 150 minutes of moderate intensity exercise a week, divided over at least 3 days a week.  An example of this would be exercising 30 minutes a day, 5 days a week.  Resistance training can increase muscle mass and increase our resting metabolic rate.   FULL BODY resistance training is recommended 2-3 times a week.  Do not do this on consecutive days to allow for muscle recovery.     Aim for a bare minimum 5000 steps, even on days you do not exercise.     Monitoring:   Weigh yourself daily.  If this causes undue stress, then just weigh yourself once a week.  Weigh yourself the same time  of the day with the same amount of clothing on.  Preferably this should be done after waking up, before you eat, and with no clothing or minimal clothing on.     Specific Goals:  Calorie goal:  3253-1552 italo/day for weight loss and 1800 to 1900 italo/day for weight maintenance  Patient lifestyle habits were reviewed and patient was congratulated on her weight loss since last visit.  Nutrition was discussed and patient was encouraged to go back to tracking her food daily and focusing on protein with each meal.  Also encourage patient to measure out her portions and make sure she is avoiding skipping meals to get an adequate nutrition daily.  Medications were discussed and patient will continue with Wegovy 1.7 mg for the next 1 to 2 months until she has her weight loss goal.  Then we will reduce the dose of Wegovy to 1 mg weekly and maintain at this dose.  If she continues to have side effects or continues to lose weight on the medication make discussed witching back to bupropion and naltrexone as she had success on this in the past.  Patient will follow-up in the office in 2 to 3 months.         Diagnoses and all orders for this visit:    Overweight with body mass index (BMI) of 28 to 28.9 in adult  -     Lipid panel; Future    Screening cholesterol level  -     Lipid panel; Future        Total time spent reviewing chart, interviewing patient, examining patient, discussing plan, answering all questions, and documentin minutes with >50% face-to-face time with the patient.    Follow up in approximately 3 months with Non-Surgical Physician/Advanced Practitioner.    Subjective:   No chief complaint on file.      Patient ID: Reba Mccarthy  is a 34 y.o. female with excess weight/obesity here to pursue weight management.  Patient is pursuing Conservative Program.   Most recent notes and records were reviewed.    HPI    Wt Readings from Last 20 Encounters:   25 68.7 kg (151 lb 6.4 oz)   25 73.8 kg (162  lb 9.6 oz)   03/27/25 74.4 kg (164 lb)   02/18/25 80.3 kg (177 lb)   12/18/24 85.3 kg (188 lb)   08/07/24 92.1 kg (203 lb)   07/23/24 94.4 kg (208 lb 3.2 oz)   06/20/24 96.9 kg (213 lb 11.2 oz)   05/22/24 97 kg (213 lb 12.8 oz)   05/01/24 98 kg (216 lb)   03/25/24 98.8 kg (217 lb 12.8 oz)   03/20/24 97.5 kg (215 lb)   12/27/23 100 kg (221 lb)   12/12/23 100 kg (220 lb 6.4 oz)   11/22/23 100 kg (221 lb 3.2 oz)   09/06/23 100 kg (221 lb)   05/24/23 97.5 kg (215 lb)   03/13/23 94.1 kg (207 lb 6.4 oz)   03/06/23 93.1 kg (205 lb 3.2 oz)   02/13/23 95.4 kg (210 lb 6.4 oz)       Patient presents today to medical weight management office for follow up.  Patient continues on Wegovy 1.7 mg weekly and has been tolerating well. She has not had an more episodes of vomiting since last visit but does occasionally have nausea the day after her injection. She is about 5-6 lbs from her goal weight and would like to continue with the medication.   Patient admits she has not been as mindful of her nutrition but her portions have been smaller.  She states there are some days where she eats better for her than others and she is trying to be mindful of her protein intake.    She had previously been on bupropion and naltrexone and felt that that also helped her appetite suppression and would be willing to go back to that to maintain her weight loss to maint        Weight loss medication and dose: Wegovy 1.7 milligrams  Started weight and date: 213.8 lbs in 5/2024  Current weight: 151.4 lbs (162.6 lbs at last OV)  Difference: -62.4 lbs (-11.2 since last OV)  Percentage of weight loss: -29.1%  Goal weight: 145 lbs     Starting BMI: 37.87 in 5/2024  Current BMI: 26.82     Waist Measurements:  5/2024: 40.5 in  3/2025: 33 in  6/2025: 32 in    MWM Weights:  5/2024: 213.8 lbs *started bupropion/naltrexone  7/2024: 208.2 lbs (-5.6 lbs)  12/2024: 188 lbs (-20.2 lbs) *started on Wegovy  3/2025: 162.6 lbs (-25.4 lbs)   6/2025: 151.4 lbs (-11.2 lbs)  "     Nutrition Prescription  Calories: 1,300-1,400 kcal  Protein: 63-78 g  Fluid: 61 ounces        Diet recall:  B: skips - coffee with hot cocoa powder  L: sandwich - ham and cheese with vegetables OR low italo wrap with ham and cheese  S: no  D: protein (chicken or pork) with rice or pasta with vegetables  S: oatmeal mug cake      Hydration: coffee with hot cocoa powder, diet soda - 1-2 cans, water - 64-80 oz  Alcohol: once every couple of week  Smoking: no  Exercise: stationary bike with bands  Occupation: stay at home mom  Sleep: varies  STOP ban/8      The following portions of the patient's history were reviewed and updated as appropriate: allergies, current medications, past family history, past medical history, past social history, past surgical history, and problem list.    Family History[1]     Review of Systems   Constitutional:  Negative for fatigue.   HENT:  Negative for sore throat.    Respiratory:  Negative for cough and shortness of breath.    Cardiovascular:  Negative for chest pain, palpitations and leg swelling.   Gastrointestinal:  Negative for abdominal pain, constipation, diarrhea and nausea.   Genitourinary:  Negative for dysuria.   Musculoskeletal:  Negative for arthralgias and back pain.   Skin:  Negative for rash.   Neurological:  Negative for headaches.   Psychiatric/Behavioral:  Negative for dysphoric mood. The patient is not nervous/anxious.        Objective:  /72 (BP Location: Left arm, Patient Position: Sitting, Cuff Size: Standard)   Pulse 81   Ht 5' 3\" (1.6 m)   Wt 68.7 kg (151 lb 6.4 oz)   LMP 2025 (Exact Date)   SpO2 98%   BMI 26.82 kg/m²     Physical Exam  Vitals and nursing note reviewed.   Constitutional:       Appearance: Normal appearance. She is obese.   HENT:      Head: Normocephalic.   Pulmonary:      Effort: Pulmonary effort is normal.     Neurological:      General: No focal deficit present.      Mental Status: She is alert and oriented to person, " place, and time.     Psychiatric:         Mood and Affect: Mood normal.         Behavior: Behavior normal.         Thought Content: Thought content normal.         Judgment: Judgment normal.            Labs   Most recent labs reviewed   Lab Results   Component Value Date    SODIUM 136 02/14/2025    K 3.7 02/14/2025     02/14/2025    CO2 25 02/14/2025    AGAP 9 02/14/2025    BUN 7 02/14/2025    CREATININE 0.88 02/14/2025    GLUC 103 02/23/2023    GLUF 90 02/14/2025    CALCIUM 9.8 02/14/2025    AST 11 (L) 03/22/2023    ALT 10 03/22/2023    ALKPHOS 78 03/22/2023    TP 6.8 03/22/2023    TBILI 0.69 03/22/2023    EGFR 84 02/14/2025     Lab Results   Component Value Date    HGBA1C 5.5 08/06/2024     Lab Results   Component Value Date    KSN0UKEKGBIL 2.743 11/22/2023    TSH 2.14 11/24/2021     Lab Results   Component Value Date    CHOLESTEROL 147 11/22/2023     Lab Results   Component Value Date    HDL 39 (L) 11/22/2023     Lab Results   Component Value Date    TRIG 69 11/22/2023     Lab Results   Component Value Date    LDLCALC 94 11/22/2023          [1]   Family History  Problem Relation Name Age of Onset    Hypertension Mother      Arthritis Mother      Hypothyroidism Mother      Stroke Father      Hypertension Father      Hypothyroidism Sister      Hypothyroidism Sister      Arthritis Maternal Grandmother      Cancer Maternal Grandfather      Stroke Paternal Grandmother      No Known Problems Paternal Grandfather      Breast cancer Neg Hx      Ovarian cancer Neg Hx      Cervical cancer Neg Hx      Uterine cancer Neg Hx

## 2025-07-12 DIAGNOSIS — E66.3 OVERWEIGHT WITH BODY MASS INDEX (BMI) OF 28 TO 28.9 IN ADULT: ICD-10-CM

## 2025-07-14 RX ORDER — SEMAGLUTIDE 1.7 MG/.75ML
INJECTION, SOLUTION SUBCUTANEOUS
Qty: 3 ML | Refills: 1 | Status: SHIPPED | OUTPATIENT
Start: 2025-07-14

## 2025-07-26 DIAGNOSIS — J45.20 MILD INTERMITTENT ASTHMA WITHOUT COMPLICATION: ICD-10-CM

## 2025-07-28 RX ORDER — AZELASTINE HYDROCHLORIDE 137 UG/1
SPRAY, METERED NASAL
Qty: 90 ML | Refills: 1 | Status: SHIPPED | OUTPATIENT
Start: 2025-07-28

## 2025-07-31 ENCOUNTER — TELEPHONE (OUTPATIENT)
Age: 35
End: 2025-07-31

## 2025-08-13 ENCOUNTER — APPOINTMENT (OUTPATIENT)
Dept: LAB | Facility: CLINIC | Age: 35
End: 2025-08-13
Payer: COMMERCIAL

## 2025-08-13 DIAGNOSIS — F41.9 ANXIETY: ICD-10-CM

## 2025-08-13 DIAGNOSIS — D75.839 THROMBOCYTHEMIA: ICD-10-CM

## 2025-08-13 DIAGNOSIS — E66.3 OVERWEIGHT WITH BODY MASS INDEX (BMI) OF 28 TO 28.9 IN ADULT: ICD-10-CM

## 2025-08-13 DIAGNOSIS — Z13.220 SCREENING CHOLESTEROL LEVEL: ICD-10-CM

## 2025-08-13 LAB
ALBUMIN SERPL BCG-MCNC: 4.3 G/DL (ref 3.5–5)
ALP SERPL-CCNC: 56 U/L (ref 34–104)
ALT SERPL W P-5'-P-CCNC: 8 U/L (ref 7–52)
ANION GAP SERPL CALCULATED.3IONS-SCNC: 10 MMOL/L (ref 4–13)
AST SERPL W P-5'-P-CCNC: 13 U/L (ref 13–39)
BASOPHILS # BLD AUTO: 0.06 THOUSANDS/ÂΜL (ref 0–0.1)
BASOPHILS NFR BLD AUTO: 1 % (ref 0–1)
BILIRUB SERPL-MCNC: 0.54 MG/DL (ref 0.2–1)
BUN SERPL-MCNC: 6 MG/DL (ref 5–25)
CALCIUM SERPL-MCNC: 9.2 MG/DL (ref 8.4–10.2)
CHLORIDE SERPL-SCNC: 103 MMOL/L (ref 96–108)
CHOLEST SERPL-MCNC: 154 MG/DL (ref ?–200)
CO2 SERPL-SCNC: 24 MMOL/L (ref 21–32)
CREAT SERPL-MCNC: 0.81 MG/DL (ref 0.6–1.3)
EOSINOPHIL # BLD AUTO: 0.11 THOUSAND/ÂΜL (ref 0–0.61)
EOSINOPHIL NFR BLD AUTO: 2 % (ref 0–6)
ERYTHROCYTE [DISTWIDTH] IN BLOOD BY AUTOMATED COUNT: 12.9 % (ref 11.6–15.1)
FERRITIN SERPL-MCNC: 7 NG/ML (ref 30–307)
GFR SERPL CREATININE-BSD FRML MDRD: 93 ML/MIN/1.73SQ M
GLUCOSE P FAST SERPL-MCNC: 83 MG/DL (ref 65–99)
HCT VFR BLD AUTO: 39.5 % (ref 34.8–46.1)
HDLC SERPL-MCNC: 48 MG/DL
HGB BLD-MCNC: 13 G/DL (ref 11.5–15.4)
IMM GRANULOCYTES # BLD AUTO: 0.01 THOUSAND/UL (ref 0–0.2)
IMM GRANULOCYTES NFR BLD AUTO: 0 % (ref 0–2)
IRON SATN MFR SERPL: 14 % (ref 15–50)
IRON SERPL-MCNC: 52 UG/DL (ref 50–212)
LDLC SERPL CALC-MCNC: 95 MG/DL (ref 0–100)
LYMPHOCYTES # BLD AUTO: 2.51 THOUSANDS/ÂΜL (ref 0.6–4.47)
LYMPHOCYTES NFR BLD AUTO: 38 % (ref 14–44)
MCH RBC QN AUTO: 29.1 PG (ref 26.8–34.3)
MCHC RBC AUTO-ENTMCNC: 32.9 G/DL (ref 31.4–37.4)
MCV RBC AUTO: 89 FL (ref 82–98)
MONOCYTES # BLD AUTO: 0.54 THOUSAND/ÂΜL (ref 0.17–1.22)
MONOCYTES NFR BLD AUTO: 8 % (ref 4–12)
NEUTROPHILS # BLD AUTO: 3.32 THOUSANDS/ÂΜL (ref 1.85–7.62)
NEUTS SEG NFR BLD AUTO: 51 % (ref 43–75)
NONHDLC SERPL-MCNC: 106 MG/DL
NRBC BLD AUTO-RTO: 0 /100 WBCS
PLATELET # BLD AUTO: 414 THOUSANDS/UL (ref 149–390)
PMV BLD AUTO: 8.2 FL (ref 8.9–12.7)
POTASSIUM SERPL-SCNC: 3.9 MMOL/L (ref 3.5–5.3)
PROT SERPL-MCNC: 7.1 G/DL (ref 6.4–8.4)
RBC # BLD AUTO: 4.46 MILLION/UL (ref 3.81–5.12)
SODIUM SERPL-SCNC: 137 MMOL/L (ref 135–147)
TIBC SERPL-MCNC: 359.8 UG/DL (ref 250–450)
TRANSFERRIN SERPL-MCNC: 257 MG/DL (ref 203–362)
TRIGL SERPL-MCNC: 56 MG/DL (ref ?–150)
TSH SERPL DL<=0.05 MIU/L-ACNC: 3.15 UIU/ML (ref 0.45–4.5)
UIBC SERPL-MCNC: 308 UG/DL (ref 155–355)
WBC # BLD AUTO: 6.55 THOUSAND/UL (ref 4.31–10.16)

## 2025-08-13 PROCEDURE — 84443 ASSAY THYROID STIM HORMONE: CPT

## 2025-08-13 PROCEDURE — 36415 COLL VENOUS BLD VENIPUNCTURE: CPT

## 2025-08-13 PROCEDURE — 85025 COMPLETE CBC W/AUTO DIFF WBC: CPT

## 2025-08-13 PROCEDURE — 83540 ASSAY OF IRON: CPT

## 2025-08-13 PROCEDURE — 80053 COMPREHEN METABOLIC PANEL: CPT

## 2025-08-13 PROCEDURE — 80061 LIPID PANEL: CPT

## 2025-08-13 PROCEDURE — 82728 ASSAY OF FERRITIN: CPT

## 2025-08-13 PROCEDURE — 83550 IRON BINDING TEST: CPT

## 2025-08-18 ENCOUNTER — OFFICE VISIT (OUTPATIENT)
Age: 35
End: 2025-08-18
Payer: COMMERCIAL

## 2025-08-18 VITALS
BODY MASS INDEX: 26.05 KG/M2 | WEIGHT: 147 LBS | DIASTOLIC BLOOD PRESSURE: 70 MMHG | OXYGEN SATURATION: 98 % | RESPIRATION RATE: 17 BRPM | SYSTOLIC BLOOD PRESSURE: 116 MMHG | HEIGHT: 63 IN | HEART RATE: 73 BPM

## 2025-08-18 DIAGNOSIS — N63.14 MASS OF LOWER INNER QUADRANT OF RIGHT BREAST: Primary | ICD-10-CM

## 2025-08-18 DIAGNOSIS — D75.839 THROMBOCYTHEMIA: ICD-10-CM

## 2025-08-18 DIAGNOSIS — Z00.00 ANNUAL PHYSICAL EXAM: ICD-10-CM

## 2025-08-18 DIAGNOSIS — J45.20 MILD INTERMITTENT ASTHMA WITHOUT COMPLICATION: ICD-10-CM

## 2025-08-18 PROCEDURE — 99214 OFFICE O/P EST MOD 30 MIN: CPT | Performed by: INTERNAL MEDICINE

## 2025-08-18 PROCEDURE — 99395 PREV VISIT EST AGE 18-39: CPT | Performed by: INTERNAL MEDICINE

## 2025-08-18 RX ORDER — FLUTICASONE PROPIONATE AND SALMETEROL 100; 50 UG/1; UG/1
1 POWDER RESPIRATORY (INHALATION) 2 TIMES DAILY
Qty: 60 BLISTER | Refills: 5 | Status: SHIPPED | OUTPATIENT
Start: 2025-08-18

## (undated) DEVICE — GLOVE SRG BIOGEL ECLIPSE 7

## (undated) DEVICE — VIAL DECANTER

## (undated) DEVICE — PACK PBDS LAP CHOLE RF

## (undated) DEVICE — SYRINGE 20ML LL

## (undated) DEVICE — GLOVE INDICATOR PI UNDERGLOVE SZ 7 BLUE

## (undated) DEVICE — GLOVE SRG BIOGEL ECLIPSE 6.5

## (undated) DEVICE — DRAPE C-ARM X-RAY

## (undated) DEVICE — ADHESIVE SKIN HIGH VISCOSITY EXOFIN 1ML

## (undated) DEVICE — CHOLE CATH KIT ARROW

## (undated) DEVICE — TROCAR: Brand: KII FIOS FIRST ENTRY

## (undated) DEVICE — SUT VICRYL 4-0 KS 27 IN J662H

## (undated) DEVICE — SPECIMEN CONTAINER STERILE PEEL PACK

## (undated) DEVICE — BAG DECANTER

## (undated) DEVICE — UNDYED BRAIDED (POLYGLACTIN 910), SYNTHETIC ABSORBABLE SUTURE: Brand: COATED VICRYL

## (undated) DEVICE — 3M™ STERI-STRIP™ REINFORCED ADHESIVE SKIN CLOSURES, R1547, 1/2 IN X 4 IN (12 MM X 100 MM), 6 STRIPS/ENVELOPE: Brand: 3M™ STERI-STRIP™

## (undated) DEVICE — PACK C-SECTION PBDS

## (undated) DEVICE — DRAPE EQUIPMENT RF WAND

## (undated) DEVICE — SUT VICRYL 0 CT-1 36 IN J946H

## (undated) DEVICE — CHLORAPREP HI-LITE 26ML ORANGE

## (undated) DEVICE — STERILE SURGICAL LUBRICANT,  TUBE: Brand: SURGILUBE

## (undated) DEVICE — TROCAR APPPLE 5MM EXTENDED LENGTH

## (undated) DEVICE — TELFA NON-ADHERENT ABSORBENT DRESSING: Brand: TELFA

## (undated) DEVICE — LIGAMAX 5 MM ENDOSCOPIC MULTIPLE CLIP APPLIER: Brand: LIGAMAX

## (undated) DEVICE — ABDOMINAL PAD: Brand: DERMACEA

## (undated) DEVICE — SUT MONOCRYL 4-0 PS-2 27 IN Y426H

## (undated) DEVICE — 3M™ STERI-STRIP™ BLEND TONE SKIN CLOSURES, B1557, TAN, 1/2 IN X 4 IN (12MM X 100MM), 6 STRIPS/ENVELOPE: Brand: 3M™ STERI-STRIP™

## (undated) DEVICE — SKIN MARKER DUAL TIP WITH RULER CAP, FLEXIBLE RULER AND LABELS: Brand: DEVON

## (undated) DEVICE — SUT MONOCRYL 0 CTX 36 IN Y398H

## (undated) DEVICE — ELECTRODE LAP J HOOK SPLIT STEM E-Z CLEAN 33CM -0021S

## (undated) DEVICE — GAUZE SPONGES,16 PLY: Brand: CURITY

## (undated) DEVICE — DRESSING TELFA 2 X 3 IN STRL

## (undated) DEVICE — TOWEL SURG XR DETECT GREEN STRL RFD

## (undated) DEVICE — TISSUE RETRIEVAL SYSTEM: Brand: INZII RETRIEVAL SYSTEM

## (undated) DEVICE — PAD GROUNDING ADULT

## (undated) DEVICE — INTENDED FOR TISSUE SEPARATION, AND OTHER PROCEDURES THAT REQUIRE A SHARP SURGICAL BLADE TO PUNCTURE OR CUT.: Brand: BARD-PARKER SAFETY BLADES SIZE 11, STERILE

## (undated) DEVICE — NEEDLE 22 G X 1 1/2 SAFETY

## (undated) DEVICE — Device